# Patient Record
Sex: FEMALE | Race: WHITE | Employment: OTHER | URBAN - METROPOLITAN AREA
[De-identification: names, ages, dates, MRNs, and addresses within clinical notes are randomized per-mention and may not be internally consistent; named-entity substitution may affect disease eponyms.]

---

## 2019-11-12 ENCOUNTER — TELEPHONE (OUTPATIENT)
Dept: FAMILY MEDICINE CLINIC | Age: 84
End: 2019-11-12

## 2019-11-13 ENCOUNTER — OFFICE VISIT (OUTPATIENT)
Dept: FAMILY MEDICINE CLINIC | Age: 84
End: 2019-11-13
Payer: MEDICARE

## 2019-11-13 VITALS
RESPIRATION RATE: 18 BRPM | TEMPERATURE: 98.4 F | WEIGHT: 110 LBS | BODY MASS INDEX: 21.6 KG/M2 | HEART RATE: 88 BPM | OXYGEN SATURATION: 98 % | DIASTOLIC BLOOD PRESSURE: 98 MMHG | HEIGHT: 60 IN | SYSTOLIC BLOOD PRESSURE: 100 MMHG

## 2019-11-13 DIAGNOSIS — Z93.9 HISTORY OF CREATION OF OSTOMY (HCC): ICD-10-CM

## 2019-11-13 DIAGNOSIS — I25.2 HISTORY OF MI (MYOCARDIAL INFARCTION): ICD-10-CM

## 2019-11-13 DIAGNOSIS — I25.10 CORONARY ARTERY DISEASE DUE TO LIPID RICH PLAQUE: ICD-10-CM

## 2019-11-13 DIAGNOSIS — M80.80XA LOCALIZED OSTEOPOROSIS WITH CURRENT PATHOLOGICAL FRACTURE, INITIAL ENCOUNTER: ICD-10-CM

## 2019-11-13 DIAGNOSIS — I25.83 CORONARY ARTERY DISEASE DUE TO LIPID RICH PLAQUE: ICD-10-CM

## 2019-11-13 DIAGNOSIS — S72.009A CLOSED FRACTURE OF HIP, UNSPECIFIED LATERALITY, INITIAL ENCOUNTER (HCC): ICD-10-CM

## 2019-11-13 DIAGNOSIS — Z91.81 AT MODERATE RISK FOR FALL: ICD-10-CM

## 2019-11-13 DIAGNOSIS — Z09 HOSPITAL DISCHARGE FOLLOW-UP: ICD-10-CM

## 2019-11-13 DIAGNOSIS — I10 ESSENTIAL HYPERTENSION: ICD-10-CM

## 2019-11-13 DIAGNOSIS — K57.92 DIVERTICULITIS: Primary | ICD-10-CM

## 2019-11-13 PROCEDURE — 99204 OFFICE O/P NEW MOD 45 MIN: CPT | Performed by: INTERNAL MEDICINE

## 2019-11-13 RX ORDER — FUROSEMIDE 40 MG/1
40 TABLET ORAL
COMMUNITY
End: 2020-03-11 | Stop reason: ALTCHOICE

## 2019-11-13 RX ORDER — HYDROCODONE BITARTRATE AND ACETAMINOPHEN 5; 325 MG/1; MG/1
1 TABLET ORAL
COMMUNITY
Start: 2019-11-08 | End: 2019-11-13 | Stop reason: ALTCHOICE

## 2019-11-13 RX ORDER — NITROGLYCERIN 0.4 MG/1
0.4 TABLET SUBLINGUAL
COMMUNITY
End: 2020-09-08 | Stop reason: SDUPTHER

## 2019-11-13 RX ORDER — IBANDRONATE SODIUM 150 MG/1
150 TABLET, FILM COATED ORAL
Qty: 30 TABLET | Refills: 5 | Status: SHIPPED | OUTPATIENT
Start: 2019-11-13

## 2019-11-13 RX ORDER — BRIMONIDINE TARTRATE 2 MG/ML
1 SOLUTION/ DROPS OPHTHALMIC
COMMUNITY

## 2019-11-13 RX ORDER — LOSARTAN POTASSIUM 25 MG/1
25 TABLET ORAL
COMMUNITY
End: 2022-06-17

## 2019-11-13 RX ORDER — POTASSIUM CHLORIDE 750 MG/1
20 TABLET, EXTENDED RELEASE ORAL
COMMUNITY
End: 2021-06-08 | Stop reason: SDUPTHER

## 2019-11-13 RX ORDER — CARVEDILOL 3.12 MG/1
3.12 TABLET ORAL
COMMUNITY

## 2019-11-13 RX ORDER — IBANDRONATE SODIUM 150 MG/1
150 TABLET, FILM COATED ORAL
COMMUNITY
End: 2019-11-13 | Stop reason: SDUPTHER

## 2019-11-13 ASSESSMENT — PATIENT HEALTH QUESTIONNAIRE - PHQ9
1. LITTLE INTEREST OR PLEASURE IN DOING THINGS: 0
2. FEELING DOWN, DEPRESSED OR HOPELESS: 0
SUM OF ALL RESPONSES TO PHQ9 QUESTIONS 1 & 2: 0
SUM OF ALL RESPONSES TO PHQ QUESTIONS 1-9: 0
SUM OF ALL RESPONSES TO PHQ QUESTIONS 1-9: 0

## 2019-11-17 ASSESSMENT — ENCOUNTER SYMPTOMS
BELCHING: 0
VOMITING: 0
FLATUS: 0
CONSTIPATION: 0
SHORTNESS OF BREATH: 0
DIARRHEA: 1
BLOOD IN STOOL: 0
ANAL BLEEDING: 0
ABDOMINAL PAIN: 0
NAUSEA: 0
HEMATOCHEZIA: 0
COLOR CHANGE: 0
ABDOMINAL DISTENTION: 0

## 2019-11-20 ENCOUNTER — TELEPHONE (OUTPATIENT)
Dept: FAMILY MEDICINE CLINIC | Age: 84
End: 2019-11-20

## 2020-01-29 ENCOUNTER — OFFICE VISIT (OUTPATIENT)
Dept: FAMILY MEDICINE CLINIC | Age: 85
End: 2020-01-29
Payer: MEDICARE

## 2020-01-29 VITALS
HEIGHT: 60 IN | RESPIRATION RATE: 16 BRPM | OXYGEN SATURATION: 95 % | BODY MASS INDEX: 21.79 KG/M2 | WEIGHT: 111 LBS | DIASTOLIC BLOOD PRESSURE: 60 MMHG | SYSTOLIC BLOOD PRESSURE: 118 MMHG | TEMPERATURE: 97.7 F | HEART RATE: 88 BPM

## 2020-01-29 PROCEDURE — 99213 OFFICE O/P EST LOW 20 MIN: CPT | Performed by: INTERNAL MEDICINE

## 2020-01-29 RX ORDER — AMOXICILLIN 875 MG/1
875 TABLET, COATED ORAL 2 TIMES DAILY
Qty: 20 TABLET | Refills: 0 | Status: SHIPPED | OUTPATIENT
Start: 2020-01-29 | End: 2020-02-08

## 2020-01-29 ASSESSMENT — ENCOUNTER SYMPTOMS
STRIDOR: 0
VOMITING: 0
DIARRHEA: 0
CHOKING: 0
NAUSEA: 0
ABDOMINAL PAIN: 0
RECTAL PAIN: 0
CHEST TIGHTNESS: 0
COLOR CHANGE: 0
BACK PAIN: 0
COUGH: 0
WHEEZING: 0
SHORTNESS OF BREATH: 0
CONSTIPATION: 0

## 2020-01-29 ASSESSMENT — PATIENT HEALTH QUESTIONNAIRE - PHQ9
SUM OF ALL RESPONSES TO PHQ9 QUESTIONS 1 & 2: 0
2. FEELING DOWN, DEPRESSED OR HOPELESS: 0
SUM OF ALL RESPONSES TO PHQ QUESTIONS 1-9: 0
SUM OF ALL RESPONSES TO PHQ QUESTIONS 1-9: 0
1. LITTLE INTEREST OR PLEASURE IN DOING THINGS: 0

## 2020-01-29 NOTE — PROGRESS NOTES
[facility]  [address]  [place] [address] [address]  Dept: [phone]  Dept Fax: [phone]    Randolph Banegas a 82 y.o. female who presents today for her medical conditions/complaints as notedbelow. James Baca is c/o of   Chief Complaint   [facility]   [facility]     [facility] is here for a cat bite that happened 1 week ago        HPI:     Here due to cat bite about 6 days ago   Wound is small but they just noticed swelling around the ankle and does have some redness   No fever or chills  No pus from wound   They have , patient has been rinsing is with saline and doing triple abx       Also has a spot on left temple she wants looked at not quite like a mole     Animal Bite    The incident occurred more than 2 days ago. The incident occurred at home. Pertinent negatives include no chest pain, no fussiness, no numbness, no visual disturbance, no abdominal pain, no nausea, no vomiting, no bladder incontinence, no headaches, no neck pain, no focal weakness, no decreased responsiveness and no cough. Her tetanus status is out of date. No results found for: LABA1C      ( goal A1C is < 7)   No results found for: LABMICR  No results found for: LDLCHOLESTEROL, LDLCALC    (goal LDL is <100)   No results found for: AST, ALT, BUN  BP Readings from Last 3 Encounters:   01/29/20 118/60   11/13/19 (!) 100/98          (goal 120/80)    Past Medical History:   Diagnosis Date    Bowel obstruction (HCC)     [facility] [address]     Diverticulitis     DVT (deep venous thrombosis) ([facility] [address]     Heart attack (HCC)     MRSA (methicillin resistant staph aureus) culture positive       Past Surgical History:   Procedure Laterality Date    TOTAL HIP ARTHROPLASTY         No family history on file. Social History     Tobacco Use    Smoking status: Never Smoker    Smokeless tobacco: Never Used   Substance Use Topics    Alcohol use:  Yes     Alcohol/week: 3.0 Negative for arthralgias, back pain, gait problem, joint swelling, myalgias, neck pain and neck stiffness. Skin: Positive for rash and wound. Negative for color change and pallor. Neurological: Negative for focal weakness, numbness and headaches. Hematological: Negative for adenopathy. Does not bruise/bleed easily. Psychiatric/Behavioral: Negative for sleep disturbance. Objective:      Physical Exam  Vitals signs and nursing note reviewed. Constitutional:       Appearance: She is well-developed. Cardiovascular:      Rate and Rhythm: Normal rate and regular rhythm. No extrasystoles are present. Pulses: Normal pulses. Heart sounds: Normal heart sounds, S1 normal and S2 normal. Heart sounds not distant. No murmur. Comments: Trace edema to right ankle   Non pitting   Mild erythema around wound/ankle   Neg homans   Pulmonary:      Effort: Pulmonary effort is normal. No respiratory distress. Breath sounds: Normal breath sounds. No wheezing or rales. Abdominal:      Palpations: There is no hepatomegaly or splenomegaly. Tenderness: There is no abdominal tenderness. Skin:     General: Skin is warm and dry. Capillary Refill: Capillary refill takes less than 2 seconds. Findings: Lesion, rash and wound present. Neurological:      Mental Status: She is alert and oriented to person, place, and time. Cranial Nerves: No cranial nerve deficit. /60 (Site: Right Upper Arm, Position: Sitting, Cuff Size: Medium Adult)   Pulse 88   Temp 97.7 °F (36.5 °C) (Tympanic)   Resp 16   Ht 5' (1.524 m)   Wt 111 lb (50.3 kg)   SpO2 95%   BMI 21.68 kg/m²     Assessment:       Diagnosis Orders   1. Cellulitis of left lower extremity     2. Cat bite, initial encounter     3. Skin lesion  Antonella Mac MD, Dermatology, [facility]:       No follow-ups on file.     Orders Placed This Encounter   Procedures   Antonella Mac MD, Dermatology, [place]     Referral Priority:   Routine     Referral Type:   Eval and Treat     Referral Reason:   Specialty Services Required     Referred to Provider:   Shweta Whitfield MD     Requested Specialty:   Dermatology     Number of Visits Requested:   1     Orders Placed This Encounter   Medications    amoxicillin (AMOXIL) 875 MG tablet     Sig: Take 1 tablet by mouth 2 times daily for 10 days     Dispense:  20 tablet     Refill:  0    amox fo one week   Warm compresses  Can continue management to wounds     Follow up with specialists   Place referral to dermatology , suspicious for squamous cell CA, pt does  Not have hx. Patientgiven educational materials - see patient instructions. Discussed use, benefit,and side effects of prescribed medications. All patient questions answered. Ptvoiced understanding. Reviewed health maintenance. Instructed to continue currentmedications, diet and exercise. Patient agreed with treatment plan. Follow up asdirected.      Electronically signed by Pranav Moreno DO on 1/29/2020 at 11:03 AM

## 2020-01-29 NOTE — PROGRESS NOTES
Visit Information    Have you changed or started any medications since your last visit including any over-the-counter medicines, vitamins, or herbal medicines? no   Are you having any side effects from any of your medications? -  no  Have you stopped taking any of your medications? Is so, why? -  no    Have you seen any other physician or provider since your last visit? No  Have you had any other diagnostic tests since your last visit? No  Have you been seen in the emergency room and/or had an admission to a hospital since we last saw you? No  Have you had your routine dental cleaning in the past 6 months? no    Have you activated your [employer] account? If not, what are your barriers?  No: Code       Patient Care Team:  Bonita Blanc DO as PCP - General (Family Medicine)  Bonita Blanc DO as PCP - [facility] Provider    Medical History Review  Past Medical, Family, and Social History reviewed and does contribute to the patient presenting condition    Health Maintenance   Topic Date Due    Potassium monitoring  1937    Creatinine monitoring  1937    Shingles Vaccine (1 of 2) 1987    Pneumococcal 65+ years Vaccine (1 of 1 - PPSV23) 2002    Annual Wellness Visit (AWV)  2019    DEXA (modify frequency per FRAX score)  2020 (Originally 2002)    DTaP/Tdap/Td vaccine (1 - Tdap) 2020 (Originally 1948)    Flu vaccine (1) 2020 (Originally 2019)

## 2020-02-25 ENCOUNTER — TELEPHONE (OUTPATIENT)
Dept: FAMILY MEDICINE CLINIC | Age: 85
End: 2020-02-25

## 2020-02-25 RX ORDER — POTASSIUM CHLORIDE 1.5 G/1.77G
20 POWDER, FOR SOLUTION ORAL DAILY
Qty: 30 EACH | Refills: 5 | Status: SHIPPED | OUTPATIENT
Start: 2020-02-25 | End: 2021-02-11 | Stop reason: SDUPTHER

## 2020-02-25 NOTE — TELEPHONE ENCOUNTER
Patient called requesting you to send in a script for potassium 20 mg either in a liquid or powder? Please advise.  Thank you

## 2020-03-11 ENCOUNTER — HOSPITAL ENCOUNTER (OUTPATIENT)
Age: 85
Setting detail: SPECIMEN
Discharge: HOME | End: 2020-03-11
Payer: MEDICARE

## 2020-03-11 ENCOUNTER — OFFICE VISIT (OUTPATIENT)
Dept: FAMILY MEDICINE CLINIC | Age: 85
End: 2020-03-11
Payer: MEDICARE

## 2020-03-11 VITALS
DIASTOLIC BLOOD PRESSURE: 62 MMHG | HEIGHT: 60 IN | SYSTOLIC BLOOD PRESSURE: 100 MMHG | WEIGHT: 108 LBS | BODY MASS INDEX: 21.2 KG/M2 | RESPIRATION RATE: 16 BRPM | TEMPERATURE: 97.2 F

## 2020-03-11 LAB
ABSOLUTE EOS #: 0.16 K/UL (ref 0–0.44)
ABSOLUTE IMMATURE GRANULOCYTE: 0.05 K/UL (ref 0–0.3)
ABSOLUTE LYMPH #: 3.15 K/UL (ref 1.1–3.7)
ABSOLUTE MONO #: 0.61 K/UL (ref 0.1–1.2)
ALBUMIN SERPL-MCNC: 3.3 G/DL (ref 3.5–5.2)
ALBUMIN/GLOBULIN RATIO: 0.8 (ref 1–2.5)
ALP BLD-CCNC: 83 U/L (ref 35–104)
ALT SERPL-CCNC: 11 U/L (ref 5–33)
ANION GAP SERPL CALCULATED.3IONS-SCNC: 13 MMOL/L (ref 9–17)
AST SERPL-CCNC: 18 U/L
BASOPHILS # BLD: 1 % (ref 0–2)
BASOPHILS ABSOLUTE: 0.12 K/UL (ref 0–0.2)
BILIRUB SERPL-MCNC: 0.18 MG/DL (ref 0.3–1.2)
BUN BLDV-MCNC: 24 MG/DL (ref 8–23)
BUN/CREAT BLD: ABNORMAL (ref 9–20)
CALCIUM SERPL-MCNC: 9.4 MG/DL (ref 8.6–10.4)
CHLORIDE BLD-SCNC: 104 MMOL/L (ref 98–107)
CO2: 14 MMOL/L (ref 20–31)
CREAT SERPL-MCNC: 0.89 MG/DL (ref 0.5–0.9)
DIFFERENTIAL TYPE: ABNORMAL
EOSINOPHILS RELATIVE PERCENT: 2 % (ref 1–4)
FOLATE: >20 NG/ML
GFR AFRICAN AMERICAN: >60 ML/MIN
GFR NON-AFRICAN AMERICAN: >60 ML/MIN
GFR SERPL CREATININE-BSD FRML MDRD: ABNORMAL ML/MIN/{1.73_M2}
GFR SERPL CREATININE-BSD FRML MDRD: ABNORMAL ML/MIN/{1.73_M2}
GLUCOSE BLD-MCNC: 111 MG/DL (ref 70–99)
HCT VFR BLD CALC: 37.1 % (ref 36.3–47.1)
HEMOGLOBIN: 11.4 G/DL (ref 11.9–15.1)
IMMATURE GRANULOCYTES: 1 %
LYMPHOCYTES # BLD: 35 % (ref 24–43)
MAGNESIUM: 1.8 MG/DL (ref 1.6–2.6)
MCH RBC QN AUTO: 29.2 PG (ref 25.2–33.5)
MCHC RBC AUTO-ENTMCNC: 30.7 G/DL (ref 28.4–34.8)
MCV RBC AUTO: 95.1 FL (ref 82.6–102.9)
MONOCYTES # BLD: 7 % (ref 3–12)
NRBC AUTOMATED: 0 PER 100 WBC
PDW BLD-RTO: 13.2 % (ref 11.8–14.4)
PLATELET # BLD: 845 K/UL (ref 138–453)
PLATELET ESTIMATE: ABNORMAL
PMV BLD AUTO: 9.6 FL (ref 8.1–13.5)
POTASSIUM SERPL-SCNC: 5 MMOL/L (ref 3.7–5.3)
RBC # BLD: 3.9 M/UL (ref 3.95–5.11)
RBC # BLD: ABNORMAL 10*6/UL
SEG NEUTROPHILS: 54 % (ref 36–65)
SEGMENTED NEUTROPHILS ABSOLUTE COUNT: 5.02 K/UL (ref 1.5–8.1)
SODIUM BLD-SCNC: 131 MMOL/L (ref 135–144)
TOTAL PROTEIN: 7.6 G/DL (ref 6.4–8.3)
TSH SERPL DL<=0.05 MIU/L-ACNC: 3.04 MIU/L (ref 0.3–5)
VITAMIN B-12: 857 PG/ML (ref 232–1245)
VITAMIN D 25-HYDROXY: 31.1 NG/ML (ref 30–100)
WBC # BLD: 9.1 K/UL (ref 3.5–11.3)
WBC # BLD: ABNORMAL 10*3/UL

## 2020-03-11 PROCEDURE — 99214 OFFICE O/P EST MOD 30 MIN: CPT | Performed by: INTERNAL MEDICINE

## 2020-03-12 LAB
THYROGLOBULIN AB: <20 IU/ML (ref 0–40)
THYROID PEROXIDASE (TPO) AB: 37.2 IU/ML (ref 0–35)

## 2020-03-13 ENCOUNTER — TELEPHONE (OUTPATIENT)
Dept: FAMILY MEDICINE CLINIC | Age: 85
End: 2020-03-13

## 2020-03-17 ENCOUNTER — HOSPITAL ENCOUNTER (OUTPATIENT)
Age: 85
Setting detail: SPECIMEN
Discharge: HOME | End: 2020-03-17
Payer: MEDICARE

## 2020-03-17 ASSESSMENT — ENCOUNTER SYMPTOMS
RECTAL PAIN: 0
VOMITING: 0
RHINORRHEA: 0
CONSTIPATION: 0
VOICE CHANGE: 0
BACK PAIN: 0
CHANGE IN BOWEL HABIT: 0
SORE THROAT: 0
ANAL BLEEDING: 0
BLOOD IN STOOL: 0
COUGH: 0
FLATUS: 0
NAUSEA: 1
SHORTNESS OF BREATH: 0
CHEST TIGHTNESS: 0
BELCHING: 0
ABDOMINAL PAIN: 1
SINUS PAIN: 0
DIARRHEA: 0
WHEEZING: 0
ABDOMINAL DISTENTION: 0
SINUS PRESSURE: 0
VISUAL CHANGE: 0
CHOKING: 0
SWOLLEN GLANDS: 0
STRIDOR: 0
TROUBLE SWALLOWING: 0

## 2020-03-17 NOTE — PROGRESS NOTES
[facility]  [address] [facility] [address] [address]  Dept: [phone]  Dept Fax: [phone]    Lo Riley a 82 y.o. female who presents today for her medical conditions/complaints as notedbelow. Beryle Moos is c/o of   Chief Complaint   Patient presents with    Blood Work     pt is here for order for blood work     Fatigue         HPI:     Patient here for follow up   Here with daughter is requesting blood work   This is actually a hospital follow up x 2  Was initially in hospital for colostomy reversal , they could not reverse the entire thing so she has illestomy now   They may attempt to reverse in 3 months   On initial discharge /when she went home they had issues with the bag/ and a lot of leakage , drainage , abnormal drainage   Then patient developed severe abdominal pain   Worsen than her initial pain ie the sbo that created the stoma/colostomy   For this they went back to [facility] ED on 2/29 /2019   They told her it was related to the surgical site wall pain/inflammation , originally were not going to keep her though she was in 10/10 pain   Ended up keeping her over night   Ct scan showed inflammation/post surgical changes and small ileus   She could barely move and was weak   Once they could get her up out of bed and move with walker they sent her home     Pts daughter states she has been very weak and very tired ever since   Up until the last day and a half she has done quite a bit better  They did follow up with surgeon dr. Anais Vidal yesterday for post op   No fever or chills   Pt is eating overall okay , very small meals   Pain has subsided greatly   Walking with walker , gait is slow but not unsteady   Not dizzy   No cp or sob , no cough   No HAs.    Did have slightly abnormal labs and anemic after surgery   bp is low but has been   Not taking the lasix any longer   Still takes one coreg at night , cardio was okay with both of the above         Fatigue   This is a recurrent problem. The current episode started 1 to 4 weeks ago. Associated symptoms include abdominal pain, arthralgias, fatigue, myalgias, nausea and weakness. Pertinent negatives include no anorexia, change in bowel habit, chest pain, chills, congestion, coughing, diaphoresis, fever, headaches, joint swelling, neck pain, numbness, rash, sore throat, swollen glands, urinary symptoms, vertigo, visual change or vomiting. The symptoms are aggravated by exertion, bending, stress, twisting, walking and standing. She has tried rest, walking, sleep and position changes for the symptoms. The treatment provided mild relief. Abdominal Pain   This is a recurrent problem. The current episode started 1 to 4 weeks ago. The onset quality is sudden. The problem occurs 2 to 4 times per day. The problem has been rapidly improving. The pain is located in the generalized abdominal region. The pain is at a severity of 2/10. The pain is mild. The quality of the pain is aching. The abdominal pain does not radiate. Associated symptoms include arthralgias, myalgias and nausea. Pertinent negatives include no anorexia, belching, constipation, diarrhea, dysuria, fever, flatus, frequency, headaches, hematuria or vomiting. The pain is aggravated by movement and palpation. Her past medical history is significant for abdominal surgery and irritable bowel syndrome. There is no history of gallstones, GERD, pancreatitis, PUD or ulcerative colitis.        No results found for: LABA1C      ( goal A1C is < 7)   No results found for: LABMICR  No results found for: LDLCHOLESTEROL, LDLCALC    (goal LDL is <100)   AST (U/L)   Date Value   03/11/2020 18     ALT (U/L)   Date Value   03/11/2020 11     BUN (mg/dL)   Date Value   03/11/2020 24 (H)     BP Readings from Last 3 Encounters:   03/11/20 100/62   01/29/20 118/60   11/13/19 (!) 100/98          (goal 120/80)    Past Medical History:   Diagnosis Date    [facility]el obstruction ([facility] [address]     Broken hip ([facility] [address]     Diverticulitis     DVT (deep venous thrombosis) ([facility] [address]     Heart attack (HCC)     MRSA (methicillin resistant staph aureus) culture positive       Past Surgical History:   Procedure Laterality Date    TOTAL HIP ARTHROPLASTY         No family history on file. Social History     Tobacco Use    Smoking status: Never Smoker    Smokeless tobacco: Never Used   Substance Use Topics    Alcohol use: Yes     Alcohol/week: 3.0 standard drinks     Types: 3 Glasses of wine per week      Current Outpatient Medications   Medication Sig Dispense Refill    potassium chloride (KLOR-CON) 20 MEQ packet Take 20 mEq by mouth daily 30 each 5    aspirin 81 MG tablet Take 81 mg by mouth      brimonidine (ALPHAGAN) 0.2 % ophthalmic solution Apply 1 drop to eye      carvedilol (COREG) 3.125 MG tablet Take 3.125 mg by mouth      losartan (COZAAR) 25 MG tablet Take 25 mg by mouth      nitroGLYCERIN (NITROSTAT) 0.4 MG SL tablet Place 0.4 mg under the tongue      potassium chloride (KLOR-CON M) 10 MEQ extended release tablet Take 20 mEq by mouth      ibandronate (BONIVA) 150 MG tablet Take 1 tablet by mouth every 30 days 30 tablet 5     No current facility-administered medications for this visit.       No Known Allergies       Health Maintenance   Topic Date Due    Shingles Vaccine (1 of 2) 12/01/1987    Pneumococcal 65+ years Vaccine (1 of 1 - PPSV23) 12/01/2002    DEXA (modify frequency per FRAX score)  11/13/2020 (Originally 12/1/2002)    DTaP/Tdap/Td vaccine (1 - Tdap) 11/13/2020 (Originally 12[birth year]1/1956)    Flu vaccine (1) 11/13/2020 (Originally 9/1/2019)    Annual Wellness Visit (AWV)  03/11/2021 (Originally 11/13/2019)    Potassium monitoring  03/11/2021    Creatinine monitoring  03/11/2021    Hepatitis A vaccine  Aged Out    Hepatitis B vaccine  Aged Out    Hib vaccine  Aged Out    Meningococcal (ACWY) vaccine  Aged Out       Subjective:     Review of Systems Constitutional: Positive for activity change and fatigue. Negative for appetite change, chills, diaphoresis, fever and unexpected weight change. HENT: Negative for congestion, rhinorrhea, sinus pressure, sinus pain, sneezing, sore throat, tinnitus, trouble swallowing and voice change. Eyes: Negative for visual disturbance. Respiratory: Negative for cough, choking, chest tightness, shortness of breath, wheezing and stridor. Cardiovascular: Negative for chest pain, palpitations and leg swelling. Gastrointestinal: Positive for abdominal pain and nausea. Negative for abdominal distention, anal bleeding, anorexia, blood in stool, change in bowel habit, constipation, diarrhea, flatus, rectal pain and vomiting. Endocrine: Negative for polydipsia and polyphagia. Genitourinary: Negative for difficulty urinating, dyspareunia, dysuria, flank pain, frequency, hematuria, menstrual problem, pelvic pain and urgency. Musculoskeletal: Positive for arthralgias and myalgias. Negative for back pain, gait problem, joint swelling and neck pain. Skin: Negative for rash. Allergic/Immunologic: Positive for immunocompromised state. Neurological: Positive for weakness. Negative for dizziness, vertigo, tremors, seizures, syncope, facial asymmetry, speech difficulty, light-headedness, numbness and headaches. Hematological: Negative for adenopathy. Bruises/bleeds easily. Psychiatric/Behavioral: Negative for decreased concentration and sleep disturbance. The patient is not nervous/anxious. Objective:      Physical Exam  Constitutional:       General: She is not in acute distress. Appearance: She is well-developed. She is ill-appearing. She is not toxic-appearing or diaphoretic. Comments:  Thin , chronically ill , NAD, not uncomfortable    HENT:      Right Ear: External ear normal.      Left Ear: External ear normal.      Nose: Nose normal.   Eyes:      Pupils: Pupils are equal, round, and reactive to

## 2020-03-19 LAB — V617F MUTATION, QNT: 0 %

## 2020-09-08 RX ORDER — NITROGLYCERIN 0.4 MG/1
0.4 TABLET SUBLINGUAL EVERY 5 MIN PRN
Qty: 25 TABLET | Refills: 5 | Status: SHIPPED | OUTPATIENT
Start: 2020-09-08 | End: 2022-03-15 | Stop reason: SDUPTHER

## 2020-09-14 RX ORDER — FUROSEMIDE 40 MG/1
40 TABLET ORAL 2 TIMES DAILY
Qty: 60 TABLET | Refills: 5 | Status: SHIPPED | OUTPATIENT
Start: 2020-09-14 | End: 2021-10-14 | Stop reason: SDUPTHER

## 2020-09-15 ENCOUNTER — TELEPHONE (OUTPATIENT)
Dept: FAMILY MEDICINE CLINIC | Age: 85
End: 2020-09-15

## 2020-09-15 RX ORDER — LATANOPROST 50 UG/ML
1 SOLUTION/ DROPS OPHTHALMIC DAILY
Qty: 1 BOTTLE | Refills: 3 | Status: SHIPPED | OUTPATIENT
Start: 2020-09-15

## 2021-02-11 RX ORDER — POTASSIUM CHLORIDE 1.5 G/1.77G
20 POWDER, FOR SOLUTION ORAL DAILY
Qty: 30 EACH | Refills: 5 | Status: SHIPPED | OUTPATIENT
Start: 2021-02-11 | End: 2021-06-08

## 2021-03-17 ENCOUNTER — OFFICE VISIT (OUTPATIENT)
Dept: FAMILY MEDICINE CLINIC | Age: 86
End: 2021-03-17
Payer: MEDICARE

## 2021-03-17 VITALS
TEMPERATURE: 97.2 F | BODY MASS INDEX: 23.36 KG/M2 | DIASTOLIC BLOOD PRESSURE: 65 MMHG | SYSTOLIC BLOOD PRESSURE: 117 MMHG | HEART RATE: 70 BPM | HEIGHT: 60 IN | WEIGHT: 119 LBS | OXYGEN SATURATION: 96 %

## 2021-03-17 DIAGNOSIS — H61.23 BILATERAL HEARING LOSS DUE TO CERUMEN IMPACTION: ICD-10-CM

## 2021-03-17 DIAGNOSIS — Z00.00 MEDICARE ANNUAL WELLNESS VISIT, SUBSEQUENT: Primary | ICD-10-CM

## 2021-03-17 DIAGNOSIS — Z00.00 ROUTINE GENERAL MEDICAL EXAMINATION AT A HEALTH CARE FACILITY: ICD-10-CM

## 2021-03-17 PROCEDURE — G0439 PPPS, SUBSEQ VISIT: HCPCS | Performed by: NURSE PRACTITIONER

## 2021-03-17 PROCEDURE — 69209 REMOVE IMPACTED EAR WAX UNI: CPT | Performed by: NURSE PRACTITIONER

## 2021-03-17 ASSESSMENT — LIFESTYLE VARIABLES
HOW OFTEN DURING THE LAST YEAR HAVE YOU FOUND THAT YOU WERE NOT ABLE TO STOP DRINKING ONCE YOU HAD STARTED: 0
AUDIT TOTAL SCORE: 1
HOW OFTEN DURING THE LAST YEAR HAVE YOU FAILED TO DO WHAT WAS NORMALLY EXPECTED FROM YOU BECAUSE OF DRINKING: 0
AUDIT-C TOTAL SCORE: 1
HAS A RELATIVE, FRIEND, DOCTOR, OR ANOTHER HEALTH PROFESSIONAL EXPRESSED CONCERN ABOUT YOUR DRINKING OR SUGGESTED YOU CUT DOWN: 0
HOW OFTEN DURING THE LAST YEAR HAVE YOU HAD A FEELING OF GUILT OR REMORSE AFTER DRINKING: 0
HOW OFTEN DURING THE LAST YEAR HAVE YOU BEEN UNABLE TO REMEMBER WHAT HAPPENED THE NIGHT BEFORE BECAUSE YOU HAD BEEN DRINKING: 0
HOW OFTEN DURING THE LAST YEAR HAVE YOU NEEDED AN ALCOHOLIC DRINK FIRST THING IN THE MORNING TO GET YOURSELF GOING AFTER A NIGHT OF HEAVY DRINKING: 0

## 2021-03-17 ASSESSMENT — PATIENT HEALTH QUESTIONNAIRE - PHQ9
2. FEELING DOWN, DEPRESSED OR HOPELESS: 1
1. LITTLE INTEREST OR PLEASURE IN DOING THINGS: 0
SUM OF ALL RESPONSES TO PHQ QUESTIONS 1-9: 1

## 2021-03-17 NOTE — PATIENT INSTRUCTIONS
Personalized Preventive Plan for Armando Holden - 3/17/2021  Medicare offers a range of preventive health benefits. Some of the tests and screenings are paid in full while other may be subject to a deductible, co-insurance, and/or copay. Some of these benefits include a comprehensive review of your medical history including lifestyle, illnesses that may run in your family, and various assessments and screenings as appropriate. After reviewing your medical record and screening and assessments performed today your provider may have ordered immunizations, labs, imaging, and/or referrals for you. A list of these orders (if applicable) as well as your Preventive Care list are included within your After Visit Summary for your review. Other Preventive Recommendations:    · A preventive eye exam performed by an eye specialist is recommended every 1-2 years to screen for glaucoma; cataracts, macular degeneration, and other eye disorders. · A preventive dental visit is recommended every 6 months. · Try to get at least 150 minutes of exercise per week or 10,000 steps per day on a pedometer . · Order or download the FREE \"Exercise & Physical Activity: Your Everyday Guide\" from [employer]. Call 8-288.624.5409 or search [employer] online. · You need 6514-8581 mg of calcium and 7576-7803 IU of vitamin D per day. It is possible to meet your calcium requirement with diet alone, but a vitamin D supplement is usually necessary to meet this goal.  · When exposed to the sun, use a sunscreen that protects against both UVA and UVB radiation with an SPF of 30 or greater. Reapply every 2 to 3 hours or after sweating, drying off with a towel, or swimming. · Always wear a seat belt when traveling in a car. Always wear a helmet when riding a bicycle or motorcycle.

## 2021-03-17 NOTE — PROGRESS NOTES
resistant staph aureus) culture positive        Past Surgical History:   Procedure Laterality Date    TOTAL HIP ARTHROPLASTY         History reviewed. No pertinent family history. CareTeam (Including outside providers/suppliers regularly involved in providing care):   Patient Care Team:  Celine Modi DO as PCP - General (Family Medicine)  Celine Modi DO as PCP - [facility] Provider    Wt Readings from Last 3 Encounters:   03/17/21 119 lb (54 kg)   03/11/20 108 lb (49 kg)   01/29/20 111 lb (50.3 kg)     Vitals:    03/17/21 1545   BP: 117/65   Site: Left Upper Arm   Position: Sitting   Cuff Size: Medium Adult   Pulse: 70   Temp: 97.2 °F (36.2 °C)   TempSrc: Tympanic   SpO2: 96%   Weight: 119 lb (54 kg)   Height: 5' (1.524 m)     Body mass index is 23.24 kg/m². Based upon direct observation of the patient, evaluation of cognition reveals recent and remote memory intact. ENT: tympanic membrane, external ear and ear canal normal bilaterally, oropharynx clear and moist with normal mucous membranes and bilateral ear canal abnormal- cerumen impaction, ears irrigated with warm water and hydrogen peroxide Post irrigation, TM intact without redness or perforation. Pt tolerated well. Patient's complete Health Risk Assessment and screening values have been reviewed and are found in Flowsheets. The following problems were reviewed today and where indicated follow up appointments were made and/or referrals ordered. Positive Risk Factor Screenings with Interventions:            General Health and ACP:  General  In general, how would you say your health is?: Good  In the past 7 days, have you experienced any of the following?  New or Increased Pain, New or Increased Fatigue, Loneliness, Social Isolation, Stress or Anger?: None of These  Do you get the social and emotional support that you need?: Yes  Do you have a Living Will?: Yes  Advance Directives     Power of  Living Will ACP-Advance Directive ACP-Power of     Not on File Not on File Not on File Not on File      General Health Risk Interventions:  · No Living Will: ACP documents already completed- patient asked to provide copy to the office    Health Habits/Nutrition:  Health Habits/Nutrition  Do you exercise for at least 20 minutes 2-3 times per week?: Yes  Have you lost any weight without trying in the past 3 months?: No  Do you eat only one meal per day?: No  Have you seen the dentist within the past year?: (!) No  Body mass index: 23.24  Health Habits/Nutrition Interventions:  · Dental exam overdue:  patient declines dental evaluation    Hearing/Vision:  No exam data present  Hearing/Vision  Do you or your family notice any trouble with your hearing that hasn't been managed with hearing aids?: (!) Yes(hearing aids)  Do you have difficulty driving, watching TV, or doing any of your daily activities because of your eyesight?: (!) Yes(reads alot)  Have you had an eye exam within the past year?: Yes  Hearing/Vision Interventions:  · Hearing concerns:  patient declines any further evaluation/treatment for hearing issues, ears flushed today  · Vision concerns:  patient encouraged to make appointment with his/her eye specialist    Safety:  Safety  Do you have working smoke detectors?: Yes  Have all throw rugs been removed or fastened?: Yes  Do you have non-slip mats or surfaces in all bathtubs/showers?: Yes  Do all of your stairways have a railing or banister?: (!) No(no stairs)  Are your doorways, halls and stairs free of clutter?: Yes  Do you always fasten your seatbelt when you are in a car?: Yes  Safety Interventions:  · Home safety tips provided    ADL:  ADLs  In the past 7 days, did you need help from others to perform any of the following everyday activities?  Eating, dressing, grooming, bathing, toileting, or walking/balance?: (!) Walking/Balance(uses walker)  In the past 7 days, did you need help from others to take care of any of the following? Laundry, housekeeping, banking/finances, shopping, telephone use, food preparation, transportation, or taking medications?: None  ADL Interventions:  · Patient declines any further evaluation/treatment for this issue  Family very supportive and helpful    Personalized Preventive Plan   Current Health Maintenance Status  Immunization History   Administered Date(s) Administered    Tdap (Boostrix, Adacel) 10/31/2016        Health Maintenance   Topic Date Due    COVID-19 Vaccine (1) Never done    Shingles Vaccine (1 of 2) Never done    DEXA (modify frequency per FRAX score)  Never done    Pneumococcal 65+ years Vaccine (1 of 1 - PPSV23) Never done   [employer] Visit (AWV)  Never done    Potassium monitoring  03/11/2021    Creatinine monitoring  03/11/2021    Flu vaccine (1) 03/17/2022 (Originally 9/1/2020)    DTaP/Tdap/Td vaccine (2 - Td) 10/31/2026    Hepatitis A vaccine  Aged Out    Hepatitis B vaccine  Aged Out    Hib vaccine  Aged Out    Meningococcal (ACWY) vaccine  Aged Out     Recommendations for DCITS Due: see orders and patient instructions/AVS.  . Recommended screening schedule for the next 5-10 years is provided to the patient in written form: see Patient Instructions/AVS.    Aundrea Carias was seen today for otalgia and medicare awv. Diagnoses and all orders for this visit:    Medicare annual wellness visit, subsequent    Routine general medical examination at a health care facility    Bilateral hearing loss due to cerumen impaction           otc debrox for cerumen for future prevention  Doing well overall, still grieving loss of her   Stay as active as able  Vitals stable today  Will request prev.  Vaccine info from [facility] pharmacy

## 2021-03-17 NOTE — PROGRESS NOTES
Visit Information    Have you changed or started any medications since your last visit including any over-the-counter medicines, vitamins, or herbal medicines? no   Have you stopped taking any of your medications? Is so, why? -  no  Are you having any side effects from any of your medications? - no    Have you seen any other physician or provider since your last visit?  no   Have you had any other diagnostic tests since your last visit?  no   Have you been seen in the emergency room and/or had an admission in a hospital since we last saw you?  no   Have you had your routine dental cleaning in the past 6 months?  no     Do you have an active MyChart account? If no, what is the barrier?   No: na    Patient Care Team:  Ruby Akhtar DO as PCP - General (Family Medicine)  Ruby Akhtar DO as PCP - [facility] Provider    Medical History Review  Past Medical, Family, and Social History reviewed and  contribute to the patient presenting condition    Health Maintenance   Topic Date Due    COVID-19 Vaccine (1) Never done    DTaP/Tdap/Td vaccine (1 - Tdap) Never done    Shingles Vaccine (1 of 2) Never done    DEXA (modify frequency per FRAX score)  Never done    Pneumococcal 65+ years Vaccine (1 of 1 - PPSV23) Never done   [employer] Visit (AWV)  Never done    Flu vaccine (1) Never done    Potassium monitoring  03/11/2021    Creatinine monitoring  03/11/2021    Hepatitis A vaccine  Aged Out    Hepatitis B vaccine  Aged Out    Hib vaccine  Aged Out    Meningococcal (ACWY) vaccine  Aged Out

## 2021-03-22 ENCOUNTER — TELEPHONE (OUTPATIENT)
Dept: FAMILY MEDICINE CLINIC | Age: 86
End: 2021-03-22

## 2021-06-08 ENCOUNTER — TELEPHONE (OUTPATIENT)
Dept: FAMILY MEDICINE CLINIC | Age: 86
End: 2021-06-08

## 2021-06-08 DIAGNOSIS — Z76.0 MEDICATION REFILL: Primary | ICD-10-CM

## 2021-06-08 RX ORDER — POTASSIUM CHLORIDE 750 MG/1
20 TABLET, EXTENDED RELEASE ORAL DAILY
Qty: 60 TABLET | Refills: 0 | Status: SHIPPED | OUTPATIENT
Start: 2021-06-08 | End: 2021-07-06

## 2021-06-10 ENCOUNTER — TELEPHONE (OUTPATIENT)
Dept: FAMILY MEDICINE CLINIC | Age: 86
End: 2021-06-10

## 2021-06-10 NOTE — TELEPHONE ENCOUNTER
Patient states it is for having 2 previously broken femurs and she requires a walker. Patient states that once its complete, that she'd like for us to give it to your assistant that is training with you as she is the patients granddaughter.

## 2021-06-10 NOTE — TELEPHONE ENCOUNTER
Yes that is fine, find out for what problem and place on my desk asap as I will not be back until the 21st

## 2021-07-04 DIAGNOSIS — Z76.0 MEDICATION REFILL: ICD-10-CM

## 2021-07-06 RX ORDER — POTASSIUM CHLORIDE 750 MG/1
TABLET, EXTENDED RELEASE ORAL
Qty: 60 TABLET | Refills: 0 | Status: SHIPPED | OUTPATIENT
Start: 2021-07-06 | End: 2021-07-16

## 2021-09-30 ENCOUNTER — TELEPHONE (OUTPATIENT)
Dept: FAMILY MEDICINE CLINIC | Age: 86
End: 2021-09-30

## 2021-09-30 DIAGNOSIS — E78.2 MIXED HYPERLIPIDEMIA: Primary | ICD-10-CM

## 2021-09-30 RX ORDER — ATORVASTATIN CALCIUM 80 MG/1
80 TABLET, FILM COATED ORAL DAILY
Qty: 30 TABLET | Refills: 5 | Status: SHIPPED | OUTPATIENT
Start: 2021-09-30 | End: 2021-09-30 | Stop reason: ALTCHOICE

## 2021-09-30 RX ORDER — ATORVASTATIN CALCIUM 80 MG/1
80 TABLET, FILM COATED ORAL DAILY
Qty: 90 TABLET | Refills: 1 | Status: SHIPPED | OUTPATIENT
Start: 2021-09-30 | End: 2022-10-23

## 2021-09-30 NOTE — TELEPHONE ENCOUNTER
Does pt want labs? It has been > 1 year\ I also do not see this on her med list? Is this new or from another provider?

## 2021-09-30 NOTE — TELEPHONE ENCOUNTER
Patient stated that the atorvastatin was being issued by her dr blankenship in [place] and also by Dr. Brett Andrade and that she's been on this medication for years. Patient also states that she would like the lab orders placed. Please advise.

## 2021-10-14 RX ORDER — FUROSEMIDE 40 MG/1
40 TABLET ORAL 2 TIMES DAILY
Qty: 60 TABLET | Refills: 5 | Status: SHIPPED | OUTPATIENT
Start: 2021-10-14 | End: 2022-10-19

## 2021-10-20 LAB
ALBUMIN SERPL-MCNC: 4.1 G/DL
ALP BLD-CCNC: 89 U/L
ALT SERPL-CCNC: 17 U/L
ANION GAP SERPL CALCULATED.3IONS-SCNC: 11 MMOL/L
AST SERPL-CCNC: 21 U/L
BASOPHILS ABSOLUTE: 0.1 /ΜL
BASOPHILS RELATIVE PERCENT: 1.4 %
BILIRUB SERPL-MCNC: 0.7 MG/DL (ref 0.1–1.4)
BUN BLDV-MCNC: 29 MG/DL
CALCIUM SERPL-MCNC: 9.4 MG/DL
CHLORIDE BLD-SCNC: 99 MMOL/L
CHOLESTEROL, TOTAL: 186 MG/DL
CHOLESTEROL/HDL RATIO: 2.4
CO2: 26 MMOL/L
CREAT SERPL-MCNC: 1 MG/DL
EOSINOPHILS ABSOLUTE: 0.1 /ΜL
EOSINOPHILS RELATIVE PERCENT: 2.4 %
GFR CALCULATED: NORMAL
GLUCOSE BLD-MCNC: 98 MG/DL
HCT VFR BLD CALC: 37.7 % (ref 36–46)
HDLC SERPL-MCNC: 79 MG/DL (ref 35–70)
HEMOGLOBIN: 12.6 G/DL (ref 12–16)
LDL CHOLESTEROL CALCULATED: 92 MG/DL (ref 0–160)
LYMPHOCYTES ABSOLUTE: 2.2 /ΜL
LYMPHOCYTES RELATIVE PERCENT: 37.5 %
MCH RBC QN AUTO: 32.1 PG
MCHC RBC AUTO-ENTMCNC: 33.4 G/DL
MCV RBC AUTO: 96 FL
MONOCYTES ABSOLUTE: 0.7 /ΜL
MONOCYTES RELATIVE PERCENT: 13 %
NEUTROPHILS ABSOLUTE: 2.6 /ΜL
NEUTROPHILS RELATIVE PERCENT: 45.7 %
NONHDLC SERPL-MCNC: ABNORMAL MG/DL
PDW BLD-RTO: 13.8 %
PLATELET # BLD: 254 K/ΜL
PMV BLD AUTO: 10.1 FL
POTASSIUM SERPL-SCNC: 4.6 MMOL/L
RBC # BLD: 3.92 10^6/ΜL
SODIUM BLD-SCNC: 136 MMOL/L
TOTAL PROTEIN: 7.4
TRIGL SERPL-MCNC: 77 MG/DL
VLDLC SERPL CALC-MCNC: 15 MG/DL
WBC # BLD: 5.8 10^3/ML

## 2021-10-22 DIAGNOSIS — E78.2 MIXED HYPERLIPIDEMIA: ICD-10-CM

## 2022-03-15 ENCOUNTER — TELEPHONE (OUTPATIENT)
Dept: FAMILY MEDICINE CLINIC | Age: 87
End: 2022-03-15

## 2022-03-15 RX ORDER — OMEPRAZOLE 20 MG/1
20 CAPSULE, DELAYED RELEASE ORAL
Qty: 90 CAPSULE | Refills: 1 | Status: SHIPPED | OUTPATIENT
Start: 2022-03-15 | End: 2022-10-07

## 2022-03-15 RX ORDER — NITROGLYCERIN 0.4 MG/1
0.4 TABLET SUBLINGUAL EVERY 5 MIN PRN
Qty: 25 TABLET | Refills: 5 | Status: SHIPPED | OUTPATIENT
Start: 2022-03-15

## 2022-03-15 NOTE — TELEPHONE ENCOUNTER
Called for clarification she needs refill of omeprazole and nitroglycerin what she has in her fridge has .     LV 3/17/2021

## 2022-03-15 NOTE — TELEPHONE ENCOUNTER
----- Message from Kirstinda Adamson sent at 3/15/2022 10:29 AM EDT -----  Subject: Message to Provider    QUESTIONS  Information for Provider? pharm was supposed to call dr Mae Terry about her   Ometripol medication. pt hasnt hear anything back. needs a prior auth on   that please give pt a call.   ---------------------------------------------------------------------------  --------------  CALL BACK INFO  What is the best way for the office to contact you? OK to leave message on   voicemail  Preferred Call Back Phone Number? [phone]  ---------------------------------------------------------------------------  --------------  SCRIPT ANSWERS  Relationship to Patient?  Self

## 2022-03-15 NOTE — TELEPHONE ENCOUNTER
I do not see any hx of omeprazole on her med hx? What dose and how often is she taking it?  I sent nitro

## 2022-05-02 ENCOUNTER — OFFICE VISIT (OUTPATIENT)
Dept: PRIMARY CARE CLINIC | Age: 87
End: 2022-05-02
Payer: MEDICARE

## 2022-05-02 VITALS
BODY MASS INDEX: 23.36 KG/M2 | DIASTOLIC BLOOD PRESSURE: 87 MMHG | OXYGEN SATURATION: 94 % | TEMPERATURE: 97.6 F | SYSTOLIC BLOOD PRESSURE: 115 MMHG | HEIGHT: 60 IN | HEART RATE: 81 BPM | WEIGHT: 119 LBS

## 2022-05-02 DIAGNOSIS — Z20.822 CONTACT WITH AND (SUSPECTED) EXPOSURE TO COVID-19: ICD-10-CM

## 2022-05-02 DIAGNOSIS — J40 BRONCHITIS: Primary | ICD-10-CM

## 2022-05-02 PROCEDURE — 99213 OFFICE O/P EST LOW 20 MIN: CPT

## 2022-05-02 RX ORDER — LIDOCAINE HYDROCHLORIDE 10 MG/ML
8 INJECTION, SOLUTION INFILTRATION; PERINEURAL
COMMUNITY

## 2022-05-02 RX ORDER — LOSARTAN POTASSIUM 50 MG/1
TABLET ORAL
COMMUNITY
End: 2022-06-17 | Stop reason: SDUPTHER

## 2022-05-02 RX ORDER — OMEPRAZOLE 20 MG/1
CAPSULE, DELAYED RELEASE ORAL
COMMUNITY
End: 2022-10-07 | Stop reason: SDUPTHER

## 2022-05-02 RX ORDER — TRIAMCINOLONE ACETONIDE 10 MG/ML
6 INJECTION, SUSPENSION INTRA-ARTICULAR; INTRALESIONAL
COMMUNITY

## 2022-05-02 RX ORDER — BENZONATATE 100 MG/1
100 CAPSULE ORAL 3 TIMES DAILY PRN
Qty: 21 CAPSULE | Refills: 0 | Status: SHIPPED | OUTPATIENT
Start: 2022-05-02 | End: 2022-05-09

## 2022-05-02 RX ORDER — POTASSIUM CHLORIDE 1.5 G/1.77G
POWDER, FOR SOLUTION ORAL
COMMUNITY

## 2022-05-02 RX ORDER — DOXYCYCLINE HYCLATE 100 MG
100 TABLET ORAL 2 TIMES DAILY
Qty: 14 TABLET | Refills: 0 | Status: SHIPPED | OUTPATIENT
Start: 2022-05-02 | End: 2022-05-09

## 2022-05-02 ASSESSMENT — PATIENT HEALTH QUESTIONNAIRE - PHQ9
SUM OF ALL RESPONSES TO PHQ9 QUESTIONS 1 & 2: 0
SUM OF ALL RESPONSES TO PHQ QUESTIONS 1-9: 0
2. FEELING DOWN, DEPRESSED OR HOPELESS: 0
SUM OF ALL RESPONSES TO PHQ QUESTIONS 1-9: 0
1. LITTLE INTEREST OR PLEASURE IN DOING THINGS: 0

## 2022-05-02 ASSESSMENT — ENCOUNTER SYMPTOMS
PHOTOPHOBIA: 0
ABDOMINAL PAIN: 0
EYE PAIN: 0
TROUBLE SWALLOWING: 0
EYE ITCHING: 0
DIARRHEA: 0
EYE DISCHARGE: 0
FACIAL SWELLING: 0
HEARTBURN: 0
BLOOD IN STOOL: 0
EYE REDNESS: 0
VOICE CHANGE: 0
ABDOMINAL DISTENTION: 0
COLOR CHANGE: 0
BACK PAIN: 0
SORE THROAT: 0
CHEST TIGHTNESS: 0
RHINORRHEA: 0
CHOKING: 0
SINUS PAIN: 0
STRIDOR: 0
RECTAL PAIN: 0
NAUSEA: 0
SINUS PRESSURE: 0
HEMOPTYSIS: 0
CONSTIPATION: 0
WHEEZING: 0
GASTROINTESTINAL NEGATIVE: 1
COUGH: 1
VOMITING: 0
ANAL BLEEDING: 0
APNEA: 0
EYES NEGATIVE: 1
SHORTNESS OF BREATH: 0

## 2022-05-02 NOTE — PROGRESS NOTES
[address] [place] [facility] [id number] [place] [address] WALK IN CARE  [address]  [place] [address]  Dept: [phone]    Yue Reilly is a 82 y.o. female Established patient, who presents to the walk-in clinic today with conditions/complaints as noted below:    Chief Complaint   Patient presents with    Congestion     pt has been having cough and congestion          HPI:     Cough  This is a new problem. Episode onset: 5 days ago. The problem has been gradually worsening. The problem occurs constantly. The cough is productive of sputum. Associated symptoms include nasal congestion. Pertinent negatives include no chest pain, chills, ear congestion, ear pain, eye redness, fever, headaches, heartburn, hemoptysis, myalgias, postnasal drip, rash, rhinorrhea, sore throat, shortness of breath, sweats, weight loss or wheezing. Nothing aggravates the symptoms. Treatments tried: Zuri seltzer plus, delsym, tea+honey. The treatment provided mild relief.         Past Medical History:   Diagnosis Date    [facility]el obstruction ([facility])     Broken hip ([address]     Diverticulitis     DVT (deep venous thrombosis) ([facility])     Heart attack ([facility])     MRSA (methicillin resistant staph aureus) culture positive        Current Outpatient Medications   Medication Sig Dispense Refill    doxycycline hyclate (VIBRA-TABS) 100 MG tablet Take 1 tablet by mouth 2 times daily for 7 days 14 tablet 0    benzonatate (TESSALON PERLES) 100 MG capsule Take 1 capsule by mouth 3 times daily as needed for Cough 21 capsule 0    lidocaine 1 % injection 8 mLs      potassium chloride (KLOR-CON) 20 MEQ packet potassium chloride 20 mEq oral packet      triamcinolone acetonide (KENALOG) 10 MG/ML injection 6 mLs      losartan (COZAAR) 50 MG tablet losartan 50 mg tablet      omeprazole (PRILOSEC) 20 MG delayed release capsule omeprazole 20 mg capsule,delayed release      nitroGLYCERIN (NITROSTAT) 0.4 MG SL tablet Place 1 tablet under the tongue every 5 minutes as needed for Chest pain 25 tablet 5    omeprazole (PRILOSEC) 20 MG delayed release capsule Take 1 capsule by mouth every morning (before breakfast) 90 capsule 1    furosemide (LASIX) 40 MG tablet Take 1 tablet by mouth 2 times daily 60 tablet 5    atorvastatin (LIPITOR) 80 MG tablet Take 1 tablet by mouth daily 90 tablet 1    potassium chloride (KLOR-CON M) 10 MEQ extended release tablet TAKE TWO TABLETS BY MOUTH DAILY 60 tablet 2    latanoprost (XALATAN) 0.005 % ophthalmic solution Place 1 drop into both eyes daily 1 Bottle 3    aspirin 81 MG tablet Take 81 mg by mouth      brimonidine (ALPHAGAN) 0.2 % ophthalmic solution Apply 1 drop to eye      carvedilol (COREG) 3.125 MG tablet Take 3.125 mg by mouth      losartan (COZAAR) 25 MG tablet Take 25 mg by mouth      ibandronate (BONIVA) 150 MG tablet Take 1 tablet by mouth every 30 days 30 tablet 5     No current facility-administered medications for this visit. No Known Allergies    Review of Systems:     Review of Systems   Constitutional: Positive for fatigue. Negative for activity change, appetite change, chills, diaphoresis, fever, unexpected weight change and weight loss. HENT: Positive for congestion. Negative for dental problem, drooling, ear discharge, ear pain, facial swelling, hearing loss, mouth sores, nosebleeds, postnasal drip, rhinorrhea, sinus pressure, sinus pain, sneezing, sore throat, tinnitus, trouble swallowing and voice change. Eyes: Negative. Negative for photophobia, pain, discharge, redness, itching and visual disturbance. Respiratory: Positive for cough (dry). Negative for apnea, hemoptysis, choking, chest tightness, shortness of breath, wheezing and stridor. Cardiovascular: Negative. Negative for chest pain, palpitations and leg swelling. Gastrointestinal: Negative. Negative for abdominal distention, abdominal pain, anal bleeding, blood in stool, constipation, diarrhea, heartburn, nausea, rectal pain and vomiting. Musculoskeletal: Negative. Negative for arthralgias, back pain, gait problem, joint swelling, myalgias, neck pain and neck stiffness. Skin: Negative. Negative for color change, pallor, rash and wound. Neurological: Negative. Negative for dizziness, tremors, seizures, syncope, facial asymmetry, speech difficulty, weakness, light-headedness, numbness and headaches. Physical Exam:      /87 (Site: Left Upper Arm, Position: Sitting, Cuff Size: Large Adult)   Pulse 81   Temp 97.6 °F (36.4 °C) (Tympanic)   Ht 5' (1.524 m)   Wt 119 lb (54 kg)   SpO2 94%   Breastfeeding No   BMI 23.24 kg/m²     Physical Exam  Vitals reviewed. Constitutional:       Appearance: Normal appearance. She is normal weight. HENT:      Head: Normocephalic and atraumatic. Right Ear: Tympanic membrane, ear canal and external ear normal.      Left Ear: Tympanic membrane, ear canal and external ear normal.      Nose: Rhinorrhea present. No congestion. Mouth/Throat:      Lips: Pink. Mouth: Mucous membranes are moist.      Pharynx: Uvula midline. Oropharyngeal exudate (post nasal drainage) and posterior oropharyngeal erythema present. No pharyngeal swelling or uvula swelling. Tonsils: No tonsillar exudate or tonsillar abscesses. Eyes:      Extraocular Movements: Extraocular movements intact. Conjunctiva/sclera: Conjunctivae normal.      Pupils: Pupils are equal, round, and reactive to light. Cardiovascular:      Rate and Rhythm: Normal rate and regular rhythm. Pulses: Normal pulses. Heart sounds: Normal heart sounds. Pulmonary:      Effort: Pulmonary effort is normal.      Breath sounds: Normal air entry. Examination of the right-upper field reveals decreased breath sounds.  Examination of the left-upper field reveals decreased breath sounds. Examination of the right-lower field reveals decreased breath sounds. Examination of the left-lower field reveals decreased breath sounds. Decreased breath sounds present. Abdominal:      General: Abdomen is flat. Bowel sounds are normal.      Palpations: Abdomen is soft. Musculoskeletal:         General: Normal range of motion. Cervical back: Normal range of motion and neck supple. Skin:     General: Skin is warm and dry. Capillary Refill: Capillary refill takes less than 2 seconds. Neurological:      General: No focal deficit present. Mental Status: She is alert and oriented to person, place, and time. Mental status is at baseline. Psychiatric:         Mood and Affect: Mood normal.         Behavior: Behavior normal.         Plan:          1. Bronchitis  -     doxycycline hyclate (VIBRA-TABS) 100 MG tablet; Take 1 tablet by mouth 2 times daily for 7 days, Disp-14 tablet, R-0Normal  -     benzonatate (TESSALON PERLES) 100 MG capsule; Take 1 capsule by mouth 3 times daily as needed for Cough, Disp-21 capsule, R-0Normal  2. Contact with and (suspected) exposure to covid-19  -     COVID-19    Continue over-the-counter medications as needed for symptoms. Use honey to the back of throat, salt water gargle as needed for sore throat, cough. Go to the ER for shortness of breath, chest pain. Patient verbalized understanding. Follow Up Instructions:      Return if symptoms worsen or fail to improve, for SOB, chest pain go to ER. Orders Placed This Encounter   Medications    doxycycline hyclate (VIBRA-TABS) 100 MG tablet     Sig: Take 1 tablet by mouth 2 times daily for 7 days     Dispense:  14 tablet     Refill:  0    benzonatate (TESSALON PERLES) 100 MG capsule     Sig: Take 1 capsule by mouth 3 times daily as needed for Cough     Dispense:  21 capsule     Refill:  0           Will send out COVID19 testing.  Possible treatment alterations based on the results. Patient instructed to self-quarantine until testing results are back. Patient instructed not to return to work until results are back. Tylenol as needed for fever/pain. Encouraged adequate hydration and rest.  The patient indicates understanding of these issues and agrees with the plan. Educational materials provided on AVS.  Follow up if symptoms do not improve/worsen. Discussed symptoms that will warrant urgent ED evaluation/treatment. Patient and/or parent given educational materials - see patient instructions. Discussed use, benefit, and side effects of prescribed medications. All patient questions answered. Patient and/or parent voiced understanding.       Electronically signed by FLORIAN Artis 5/2/2022 at 3:48 PM

## 2022-05-02 NOTE — PATIENT INSTRUCTIONS
Patient Education        Learning About Coronavirus [phone])  What is coronavirus (COVID-19)? COVID-19 is a disease caused by a type of coronavirus. This illness was firstfound in December 2019. It has since spread worldwide. Coronaviruses are a large group of viruses. They cause the common cold. They also cause more serious illnesses like [place] East respiratory syndrome (MERS) and severe acute respiratory syndrome (SARS). COVID-19 is caused by a novelcoronavirus. That means it's a new type that has not been seen in people before. What are the symptoms? COVID-19 symptoms may include:   Fever.  Cough.  Trouble breathing.  Chills or repeated shaking with chills.  Muscle and body aches.  Headache.  Sore throat.  New loss of taste or smell.  Vomiting.  Diarrhea. In severe cases, COVID-19 can cause pneumonia and make it hard to breathewithout help from a machine. It can cause death. How is it diagnosed? COVID-19 is diagnosed with a viral test. This may also be called a PCR test or antigen test. It looks for evidence of the virus in your breathing passages orlungs (respiratory system). The test is most often done on a sample from the nose, throat, or lungs. It's sometimes done on a sample of saliva. One way a sample is collected is byputting a long swab into the back of your nose. If you have questions about COVID-19 testing, ask your doctor or go to cdc.govto use the COVID-19 Viral Testing Tool. How is it treated? Mild cases of COVID-19 can be treated at home. Serious cases need treatment in the hospital. Treatment may include medicines to reduce symptoms, plus breathing support such as oxygen therapy or a ventilator. Some people may beplaced on their belly to help their oxygen levels. Treatments that may help people who have COVID-19 include:  Antiviral medicines. These medicines treat viral infections. Immune-based therapy. These medicines help the immune system fight COVID-19. Examples include monoclonal antibodies. Blood thinners. These medicines help prevent blood clots. People with severe illness are at risk for blood clots. How can you protect yourself and others?  Get vaccinated and boosted.  Avoid sick people and stay away from others if you are sick.  Stay at least 6 feet away from other people.  Avoid crowds, especially inside.  Get tested for COVID-19 before you have an indoor visit with people that don't live with you.  Cover your mouth with a tissue when you cough or sneeze.  Wash your hands often, especially after you cough or sneeze. Use soap and water, and scrub for at least 20 seconds. If soap and water aren't available, use an alcohol-based hand .  Avoid touching your mouth, nose, and eyes. Be sure to follow all instructions from the [facility] and your local health authorities. Here are some examples of specific precautions you may need totake.  If you are not fully vaccinated and boosted:  ? Wear a mask if you have to go to public areas.  Even if you're fully vaccinated and boosted, there's still a chance you can get and spread COVID-19. If you live in an area where COVID-19 is spreading quickly, wear a mask if you have to go to indoor public areas. You might also want to wear a mask in crowded outdoor areas as well as public indoor spaces if you:  ? Have certain health conditions. ? Live with someone who has a compromised immune system. ? Live with someone who is not fully vaccinated.  If you have been exposed to the virus and are not fully vaccinated and boosted:  ? Talk to your doctor as soon as you can. Your doctor might have you take medicine to help prevent serious illness. ? Get a COVID-19 test. You may need to be tested more than once. ? Stay home. Try to separate from other people where you live. Don't go to school, work, or public areas. ? Wear a mask around other people for a full 10 days.  Avoid travel and stay away from people at high risk for serious illness. ? Watch for symptoms.  If you have been exposed and you are fully vaccinated and boosted:  ? Talk to your doctor as soon as you can. Your doctor may have you take medicine to help prevent serious illness. ? Get a COVID-19 test. You may need to be tested more than once. ? Wear a mask around other people for a full 10 days. Avoid travel and stay away from people at high risk for serious illness. ? Watch for symptoms. If you're sick or test positive for COVID-19:   Talk to your doctor as soon as you can. Your doctor may have you take medicine to help prevent serious illness.  Get a COVID-19 test unless you have already been tested. You may need to be tested more than once.  Stay home. Leave only if you need to get medical care.  Wear a mask whenever you're around other people for a full 10 days.  Avoid travel and stay away from people at high risk for serious illness.  Limit contact with pets and people in your home. If possible, stay in a separate bedroom and use a separate bathroom.  Clean and disinfect your home every day. Use household  and disinfectant wipes or sprays. Take special care to clean things that you touch with your hands. If you have questions about COVID-19 testing, ask your doctor or go to cdc.govto use the COVID-19 Viral Testing Tool. How can you self-isolate when you have COVID-19? If you have COVID-19, there are things you can do to help avoid spreading thevirus to others.  Limit contact with people in your home. If possible, stay in a separate bedroom and use a separate bathroom.  Wear a mask when you are around other people.  If you have to leave home, avoid crowds and try to stay at least 6 feet away from other people.  Avoid contact with pets and other animals.  Cover your mouth and nose with a tissue when you cough or sneeze. Then throw it in the trash right away.    Wash your hands often, especially after you cough or sneeze. Use soap and water, and scrub for at least 20 seconds. If soap and water aren't available, use an alcohol-based hand .  Don't share personal household items. These include bedding, towels, cups and glasses, and eating utensils. [address] in the warmest water allowed for the fabric type, and dry it completely. It's okay to wash other people's laundry with yours.  Clean and disinfect your home. Use household  and disinfectant wipes or sprays. When should you call for help? Call 911 anytime you think you may need emergency care. For example, call if you have life-threatening symptoms, such as:     You have severe trouble breathing. (You can't talk at all.)      You have constant chest pain or pressure.      You are severely dizzy or lightheaded.      You are confused or can't think clearly.      You have pale, gray, or blue-colored skin or lips.      You pass out (lose consciousness) or are very hard to wake up.      You have loss of balance or trouble walking.      You have trouble seeing out of one or both eyes.      You have weakness or drooping on one side of the face.      You have weakness or numbness in an arm or a leg.      You have trouble speaking.      You have a severe headache.      You have a seizure. Call your doctor now or seek immediate medical care if:     You have moderate trouble breathing. (You can't speak a full sentence.)      You are coughing up blood.      You have signs of low blood pressure. These include feeling lightheaded; being too weak to stand; and having cold, pale, clammy skin. Watch closely for changes in your health, and be sure to contact your doctor if:     Your symptoms get worse.      You are not getting better as expected.      You have new or worse symptoms of anxiety, depression, nightmares, or flashbacks. Call before you go to the doctor's office. Follow their instructions. And wear a mask.   Where can you learn more?  Go to [email or web address] and sign in to your [employer] account. Enter C008 in the [facility] box to learn more about \"Learning About Coronavirus (COVID-19). \"     If you do not have an account, please click on the \"Sign Up Now\" link. Current as of: July 1, 2021               Content Version: 13.2  © 2006-2022 Healthwise, Incorporated. Care instructions adapted under license by [facility] ([facility]). If you have questions about a medical condition or this instruction, always ask your healthcare professional. [address] any warranty or liability for your use of this information.

## 2022-05-03 ENCOUNTER — HOSPITAL ENCOUNTER (OUTPATIENT)
Age: 87
Setting detail: SPECIMEN
Discharge: HOME | End: 2022-05-03

## 2022-05-04 LAB
SARS-COV-2: NORMAL
SARS-COV-2: NOT DETECTED
SOURCE: NORMAL

## 2022-06-10 ENCOUNTER — TELEPHONE (OUTPATIENT)
Dept: FAMILY MEDICINE CLINIC | Age: 87
End: 2022-06-10

## 2022-06-10 NOTE — TELEPHONE ENCOUNTER
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     LV 3/17/2021

## 2022-06-11 NOTE — TELEPHONE ENCOUNTER
Recommend patient discuss with GI or her surgeon. Find out who she is seeing.  Has not been in our office in over a year

## 2022-06-17 RX ORDER — LOSARTAN POTASSIUM 50 MG/1
TABLET ORAL
Qty: 30 TABLET | Refills: 2 | Status: SHIPPED | OUTPATIENT
Start: 2022-06-17 | End: 2022-09-06

## 2022-06-17 RX ORDER — LOSARTAN POTASSIUM 25 MG/1
25 TABLET ORAL DAILY
Qty: 30 TABLET | Refills: 2 | OUTPATIENT
Start: 2022-06-17

## 2022-06-23 ENCOUNTER — OFFICE VISIT (OUTPATIENT)
Dept: FAMILY MEDICINE CLINIC | Age: 87
End: 2022-06-23
Payer: MEDICARE

## 2022-06-23 VITALS
TEMPERATURE: 97.8 F | HEIGHT: 60 IN | OXYGEN SATURATION: 94 % | WEIGHT: 143.8 LBS | HEART RATE: 80 BPM | DIASTOLIC BLOOD PRESSURE: 74 MMHG | BODY MASS INDEX: 28.23 KG/M2 | SYSTOLIC BLOOD PRESSURE: 126 MMHG

## 2022-06-23 DIAGNOSIS — L98.9 SKIN LESION: ICD-10-CM

## 2022-06-23 DIAGNOSIS — Z00.00 MEDICARE ANNUAL WELLNESS VISIT, SUBSEQUENT: Primary | ICD-10-CM

## 2022-06-23 DIAGNOSIS — I10 ESSENTIAL HYPERTENSION: ICD-10-CM

## 2022-06-23 DIAGNOSIS — E78.2 MIXED HYPERLIPIDEMIA: ICD-10-CM

## 2022-06-23 PROCEDURE — G0439 PPPS, SUBSEQ VISIT: HCPCS | Performed by: NURSE PRACTITIONER

## 2022-06-23 PROCEDURE — 1123F ACP DISCUSS/DSCN MKR DOCD: CPT | Performed by: NURSE PRACTITIONER

## 2022-06-23 SDOH — ECONOMIC STABILITY: FOOD INSECURITY: WITHIN THE PAST 12 MONTHS, THE FOOD YOU BOUGHT JUST DIDN'T LAST AND YOU DIDN'T HAVE MONEY TO GET MORE.: NEVER TRUE

## 2022-06-23 SDOH — ECONOMIC STABILITY: FOOD INSECURITY: WITHIN THE PAST 12 MONTHS, YOU WORRIED THAT YOUR FOOD WOULD RUN OUT BEFORE YOU GOT MONEY TO BUY MORE.: NEVER TRUE

## 2022-06-23 ASSESSMENT — PATIENT HEALTH QUESTIONNAIRE - PHQ9
SUM OF ALL RESPONSES TO PHQ QUESTIONS 1-9: 0
1. LITTLE INTEREST OR PLEASURE IN DOING THINGS: 0
2. FEELING DOWN, DEPRESSED OR HOPELESS: 0
SUM OF ALL RESPONSES TO PHQ9 QUESTIONS 1 & 2: 0
SUM OF ALL RESPONSES TO PHQ QUESTIONS 1-9: 0

## 2022-06-23 ASSESSMENT — LIFESTYLE VARIABLES
HOW MANY STANDARD DRINKS CONTAINING ALCOHOL DO YOU HAVE ON A TYPICAL DAY: 1 OR 2
HOW OFTEN DO YOU HAVE A DRINK CONTAINING ALCOHOL: 2-3 TIMES A WEEK

## 2022-06-23 ASSESSMENT — SOCIAL DETERMINANTS OF HEALTH (SDOH): HOW HARD IS IT FOR YOU TO PAY FOR THE VERY BASICS LIKE FOOD, HOUSING, MEDICAL CARE, AND HEATING?: NOT HARD AT ALL

## 2022-06-23 NOTE — PROGRESS NOTES
Visit Information    Have you changed or started any medications since your last visit including any over-the-counter medicines, vitamins, or herbal medicines? no   Are you having any side effects from any of your medications? -  no  Have you stopped taking any of your medications? Is so, why? -  no    Have you seen any other physician or provider since your last visit? No  Have you had any other diagnostic tests since your last visit? No  Have you been seen in the emergency room and/or had an admission to a hospital since we last saw you? No  Have you had your routine dental cleaning in the past 6 months? no    Have you activated your [employer] account? If not, what are your barriers?  Yes     Patient Care Team:  FLORIAN Ventura CNP as PCP - General (Family Nurse Practitioner)  FLORIAN Ventura CNP as PCP - [facility] Provider    Medical History Review  Past Medical, Family, and Social History reviewed and does contribute to the patient presenting condition    Health Maintenance   Topic Date Due    DEXA (modify frequency per FRAX score)  Never done    COVID-19 Vaccine (3 - Booster for [place] Peter series) 07/04/2021    Annual Wellness Visit (AWV)  03/18/2022    Shingles vaccine (3 of 3) 04/07/2022    Flu vaccine (Season Ended) 09/01/2022    Lipids  10/20/2022    Depression Screen  05/02/2023    DTaP/Tdap/Td vaccine (2 - Td or Tdap) 10/31/2026    Pneumococcal 65+ years Vaccine  Completed    Hepatitis A vaccine  Aged Out    Hepatitis B vaccine  Aged Out    Hib vaccine  Aged Out    Meningococcal (ACWY) vaccine  Aged Out

## 2022-06-23 NOTE — PATIENT INSTRUCTIONS
Personalized Preventive Plan for Tracy Gamez - 6/23/2022  Medicare offers a range of preventive health benefits. Some of the tests and screenings are paid in full while other may be subject to a deductible, co-insurance, and/or copay. Some of these benefits include a comprehensive review of your medical history including lifestyle, illnesses that may run in your family, and various assessments and screenings as appropriate. After reviewing your medical record and screening and assessments performed today your provider may have ordered immunizations, labs, imaging, and/or referrals for you. A list of these orders (if applicable) as well as your Preventive Care list are included within your After Visit Summary for your review. Other Preventive Recommendations:    · A preventive eye exam performed by an eye specialist is recommended every 1-2 years to screen for glaucoma; cataracts, macular degeneration, and other eye disorders. · A preventive dental visit is recommended every 6 months. · Try to get at least 150 minutes of exercise per week or 10,000 steps per day on a pedometer . · Order or download the FREE \"Exercise & Physical Activity: Your Everyday Guide\" from [employer]. Call 6-341.383.7446 or search [employer] online. · You need 5282-0144 mg of calcium and 9899-0843 IU of vitamin D per day. It is possible to meet your calcium requirement with diet alone, but a vitamin D supplement is usually necessary to meet this goal.  · When exposed to the sun, use a sunscreen that protects against both UVA and UVB radiation with an SPF of 30 or greater. Reapply every 2 to 3 hours or after sweating, drying off with a towel, or swimming. · Always wear a seat belt when traveling in a car. Always wear a helmet when riding a bicycle or motorcycle.

## 2022-06-23 NOTE — PROGRESS NOTES
Medicare Annual Wellness Visit    Emily Sprague is here for Medicare AWV    Assessment & Plan   Medicare annual wellness visit, subsequent      Recommendations for Preventive Services Due: see orders and patient instructions/AVS.  Recommended screening schedule for the next 5-10 years is provided to the patient in written form: see Patient Instructions/AVS.     Return for Medicare Annual Wellness Visit in 1 year. Subjective     Has concerns for skin lesion to right sided upper chest. Raised, brown, rounded with rough portion. Has no pain or swelling. No warmth or redness surrounding. Patient's complete Health Risk Assessment and screening values have been reviewed and are found in Flowsheets. The following problems were reviewed today and where indicated follow up appointments were made and/or referrals ordered.     Positive Risk Factor Screenings with Interventions:             General Health and ACP:  General  In general, how would you say your health is?: Good  In the past 7 days, have you experienced any of the following: New or Increased Pain, New or Increased Fatigue, Loneliness, Social Isolation, Stress or Anger?: No  Do you get the social and emotional support that you need?: Yes  Do you have a Living Will?: Yes    Advance Directives     Power of  Living Will ACP-Advance Directive ACP-Power of     Not on File Not on File Not on File Not on File      General Health Risk Interventions:  · No Living Will: ACP documents already completed- patient asked to provide copy to the office       ADLs:  In the past 7 days, did you need help from others to perform any of the following everyday activities: Eating, dressing, grooming, bathing, toileting, or walking/balance?: No  In the past 7 days, did you need help from others to take care of any of the following: Laundry, housekeeping, banking/finances, shopping, telephone use, food preparation, transportation, or taking medications?: (!) Yes  Select all that apply: (!) Transportation    ADL Interventions:  · transportation from daughter and grand kids, full supports system          Objective   Vitals:    06/23/22 1549   BP: 126/74   Site: Right Upper Arm   Position: Sitting   Cuff Size: Medium Adult   Pulse: 80   Temp: 97.8 °F (36.6 °C)   TempSrc: Tympanic   SpO2: 94%   Weight: 143 lb 12.8 oz (65.2 kg)   Height: 5' (1.524 m)      Body mass index is 28.08 kg/m². No Known Allergies  Prior to Visit Medications    Medication Sig Taking?  Authorizing Provider   losartan (COZAAR) 50 MG tablet losartan 50 mg tablet Yes Maira Angulo PA-C   lidocaine 1 % injection 8 mLs Yes Historical Provider, MD   potassium chloride (KLOR-CON) 20 MEQ packet potassium chloride 20 mEq oral packet Yes Historical Provider, MD   triamcinolone acetonide (KENALOG) 10 MG/ML injection 6 mLs Yes Historical Provider, MD   omeprazole (PRILOSEC) 20 MG delayed release capsule omeprazole 20 mg capsule,delayed release Yes Historical Provider, MD   nitroGLYCERIN (NITROSTAT) 0.4 MG SL tablet Place 1 tablet under the tongue every 5 minutes as needed for Chest pain Yes FLORIAN Henry CNP   omeprazole (PRILOSEC) 20 MG delayed release capsule Take 1 capsule by mouth every morning (before breakfast) Yes FLORIAN Henry CNP   furosemide (LASIX) 40 MG tablet Take 1 tablet by mouth 2 times daily Yes FLORIAN Henry CNP   atorvastatin (LIPITOR) 80 MG tablet Take 1 tablet by mouth daily Yes FLORIAN Henry CNP   potassium chloride (KLOR-CON M) 10 MEQ extended release tablet TAKE TWO TABLETS BY MOUTH DAILY Yes FLORIAN Henry CNP   latanoprost (XALATAN) 0.005 % ophthalmic solution Place 1 drop into both eyes daily Yes FLORIAN Henry CNP   aspirin 81 MG tablet Take 81 mg by mouth Yes Historical Provider, MD   brimonidine (ALPHAGAN) 0.2 % ophthalmic solution Apply 1 drop to eye Yes Historical Provider, MD   carvedilol (COREG) 3.125 MG tablet Take 3.125 mg by mouth Yes Historical Provider, MD   ibandronate (BONIVA) 150 MG tablet Take 1 tablet by mouth every 30 days Yes DO Ada Salas (Including outside providers/suppliers regularly involved in providing care):   Patient Care Team:  FLORIAN Katz CNP as PCP - General (Family Nurse Practitioner)  Will FLORIAN Kwok CNP as PCP - [facility] Empaneled Provider     Reviewed and updated this visit:  Allergies  Meds

## 2022-07-05 DIAGNOSIS — J40 BRONCHITIS: ICD-10-CM

## 2022-07-05 RX ORDER — BENZONATATE 100 MG/1
CAPSULE ORAL
Qty: 21 CAPSULE | Refills: 0 | OUTPATIENT
Start: 2022-07-05

## 2022-07-05 RX ORDER — DOXYCYCLINE HYCLATE 100 MG
TABLET ORAL
Qty: 14 TABLET | Refills: 0 | OUTPATIENT
Start: 2022-07-05

## 2022-08-01 DIAGNOSIS — Z76.0 MEDICATION REFILL: ICD-10-CM

## 2022-08-01 RX ORDER — POTASSIUM CHLORIDE 750 MG/1
TABLET, EXTENDED RELEASE ORAL
Qty: 60 TABLET | Refills: 2 | Status: SHIPPED | OUTPATIENT
Start: 2022-08-01

## 2022-08-29 ENCOUNTER — OFFICE VISIT (OUTPATIENT)
Dept: FAMILY MEDICINE CLINIC | Age: 87
End: 2022-08-29
Payer: MEDICARE

## 2022-08-29 VITALS
HEART RATE: 83 BPM | SYSTOLIC BLOOD PRESSURE: 136 MMHG | TEMPERATURE: 97.9 F | HEIGHT: 60 IN | WEIGHT: 142 LBS | DIASTOLIC BLOOD PRESSURE: 80 MMHG | OXYGEN SATURATION: 92 % | BODY MASS INDEX: 27.88 KG/M2

## 2022-08-29 DIAGNOSIS — S81.801A LEG WOUND, RIGHT, INITIAL ENCOUNTER: ICD-10-CM

## 2022-08-29 DIAGNOSIS — S81.802A LEG WOUND, LEFT, INITIAL ENCOUNTER: Primary | ICD-10-CM

## 2022-08-29 DIAGNOSIS — I87.8 VENOUS STASIS OF LOWER EXTREMITY: ICD-10-CM

## 2022-08-29 PROCEDURE — 99213 OFFICE O/P EST LOW 20 MIN: CPT | Performed by: NURSE PRACTITIONER

## 2022-08-29 PROCEDURE — 1123F ACP DISCUSS/DSCN MKR DOCD: CPT | Performed by: NURSE PRACTITIONER

## 2022-08-29 RX ORDER — CEPHALEXIN 500 MG/1
500 CAPSULE ORAL 3 TIMES DAILY
Qty: 30 CAPSULE | Refills: 0 | Status: SHIPPED | OUTPATIENT
Start: 2022-08-29 | End: 2022-09-08

## 2022-08-29 ASSESSMENT — ENCOUNTER SYMPTOMS
NAUSEA: 0
VOMITING: 0
SHORTNESS OF BREATH: 0
COUGH: 0
CHEST TIGHTNESS: 0
ABDOMINAL PAIN: 0

## 2022-08-29 NOTE — PROGRESS NOTES
Visit Information    Have you changed or started any medications since your last visit including any over-the-counter medicines, vitamins, or herbal medicines? no   Have you stopped taking any of your medications? Is so, why? -  no  Are you having any side effects from any of your medications? - no    Have you seen any other physician or provider since your last visit?  no   Have you had any other diagnostic tests since your last visit?  no   Have you been seen in the emergency room and/or had an admission in a hospital since we last saw you?  no   Have you had your routine dental cleaning in the past 6 months?  no     Do you have an active MyChart account? If no, what is the barrier?   No: na    Patient Care Team:  FLORIAN Grimaldo CNP as PCP - General (Family Nurse Practitioner)  FLORIAN Grimaldo CNP as PCP - [facility] Provider    Medical History Review  Past Medical, Family, and Social History reviewed and  contribute to the patient presenting condition    Health Maintenance   Topic Date Due    DEXA (modify frequency per FRAX score)  Never done    COVID-19 Vaccine (3 - Booster for [employer] series) 07/04/2021    Shingles vaccine (3 of 3) 04/07/2022    Flu vaccine (1) 09/01/2022    Lipids  10/20/2022    Depression Screen  06/23/2023    Annual Wellness Visit (AWV)  06/24/2023    DTaP/Tdap/Td vaccine (2 - Td or Tdap) 10/31/2026    Pneumococcal 65+ years Vaccine  Completed    Hepatitis A vaccine  Aged Out    Hepatitis B vaccine  Aged Out    Hib vaccine  Aged Out    Meningococcal (ACWY) vaccine  Aged Out

## 2022-08-30 NOTE — PROGRESS NOTES
[address]  [place], [address]  [phone]      Malik Corea is a 82 y.o. female who presents today for her  medicalconditions/complaints as noted below. Malik Corea is c/o of Lesion(s) (Possible ulcers on bilateral calves,has had in past,was told in past to wear compression stockings,has forgotten to wear until today, sores look like they did when she had previous ulcers)  . HPI:    HPI  Pt here today with dtg. For evaluation of two wounds on the back of her legs. Started at the end of July after a leash got wrapped behind her legs and caused a sore. States she has had wounds like this before and have taken a very long time to heal. She denies fever, chills, joint pain or pain with walking. Does use a walker for ongoing hip pain. Past Medical History:   Diagnosis Date    Bowel obstruction ([address]     Broken hip ([address]     Diverticulitis     DVT (deep venous thrombosis) (HCC)     Heart attack ([facility])     MRSA (methicillin resistant staph aureus) culture positive       Past Surgical History:   Procedure Laterality Date    TOTAL HIP ARTHROPLASTY       History reviewed. No pertinent family history. Social History     Tobacco Use    Smoking status: Never    Smokeless tobacco: Never   Substance Use Topics    Alcohol use:  Yes     Alcohol/week: 3.0 standard drinks     Types: 3 Glasses of wine per week      Current Outpatient Medications   Medication Sig Dispense Refill    cephALEXin (KEFLEX) 500 MG capsule Take 1 capsule by mouth 3 times daily for 10 days 30 capsule 0    potassium chloride (KLOR-CON M) 10 MEQ extended release tablet TAKE TWO TABLETS BY MOUTH DAILY 60 tablet 2    losartan (COZAAR) 50 MG tablet losartan 50 mg tablet 30 tablet 2    lidocaine 1 % injection 8 mLs      potassium chloride (KLOR-CON) 20 MEQ packet potassium chloride 20 mEq oral packet      triamcinolone acetonide (KENALOG) 10 MG/ML injection 6 mLs      omeprazole (PRILOSEC) 20 MG delayed release capsule omeprazole 20 mg capsule,delayed release      nitroGLYCERIN (NITROSTAT) 0.4 MG SL tablet Place 1 tablet under the tongue every 5 minutes as needed for Chest pain 25 tablet 5    omeprazole (PRILOSEC) 20 MG delayed release capsule Take 1 capsule by mouth every morning (before breakfast) 90 capsule 1    furosemide (LASIX) 40 MG tablet Take 1 tablet by mouth 2 times daily 60 tablet 5    atorvastatin (LIPITOR) 80 MG tablet Take 1 tablet by mouth daily 90 tablet 1    latanoprost (XALATAN) 0.005 % ophthalmic solution Place 1 drop into both eyes daily 1 Bottle 3    aspirin 81 MG tablet Take 81 mg by mouth      brimonidine (ALPHAGAN) 0.2 % ophthalmic solution Apply 1 drop to eye      carvedilol (COREG) 3.125 MG tablet Take 3.125 mg by mouth      ibandronate (BONIVA) 150 MG tablet Take 1 tablet by mouth every 30 days 30 tablet 5     No current facility-administered medications for this visit. No Known Allergies    Health Maintenance   Topic Date Due    DEXA (modify frequency per FRAX score)  Never done    COVID-19 Vaccine (3 - Booster for Pfizer series) 07/04/2021    Shingles vaccine (3 of 3) 04/07/2022    Flu vaccine (1) 09/01/2022    Lipids  10/20/2022    Depression Screen  06/23/2023    Annual Wellness Visit (AWV)  06/24/2023    DTaP/Tdap/Td vaccine (2 - Td or Tdap) 10/31/2026    Pneumococcal 65+ years Vaccine  Completed    Hepatitis A vaccine  Aged Out    Hepatitis B vaccine  Aged Out    Hib vaccine  Aged Out    Meningococcal (ACWY) vaccine  Aged Out       Subjective:      Review of Systems   Constitutional:  Positive for activity change. Negative for appetite change, chills, diaphoresis, fatigue and fever. Eyes:  Negative for visual disturbance. Respiratory:  Negative for cough, chest tightness and shortness of breath. Cardiovascular:  Negative for chest pain, palpitations and leg swelling. Gastrointestinal:  Negative for abdominal pain, nausea and vomiting.    Musculoskeletal:  Positive for arthralgias (hips). Negative for joint swelling and myalgias. Skin:  Positive for wound. Negative for rash. Neurological:  Negative for dizziness and headaches. Hematological:  Negative for adenopathy. Psychiatric/Behavioral:  Negative for sleep disturbance. Objective:      Physical Exam  Vitals and nursing note reviewed. Constitutional:       Appearance: Normal appearance. She is normal weight. She is not ill-appearing or diaphoretic. HENT:      Head: Normocephalic and atraumatic. Mouth/Throat:      Mouth: Mucous membranes are moist.   Eyes:      Conjunctiva/sclera: Conjunctivae normal.   Cardiovascular:      Pulses: Normal pulses. Musculoskeletal:      Cervical back: Neck supple. Skin:     General: Skin is warm and dry. Capillary Refill: Capillary refill takes 2 to 3 seconds. Comments: Mx old healed scars to BLE from pervious wounds, circular wound on posterior right and left calf with purulent drainage and no necrosis. Intact pedal pulses and venous statis present bilaterally. Neurological:      General: No focal deficit present. Mental Status: She is alert and oriented to person, place, and time. Mental status is at baseline. Cranial Nerves: No cranial nerve deficit. Psychiatric:         Mood and Affect: Mood normal.         Behavior: Behavior normal.         Thought Content: Thought content normal.         Judgment: Judgment normal.       Assessment:       Diagnosis Orders   1. Leg wound, left, initial encounter  cephALEXin (KEFLEX) 500 MG capsule    [address]      2. Leg wound, right, initial encounter  cephALEXin (KEFLEX) 500 MG capsule    [address]      3. Venous stasis of lower extremity            Plan:      Return if symptoms worsen or fail to improve.   Orders Placed This Encounter   Procedures    [address]     Referral Priority:   Routine

## 2022-09-06 RX ORDER — LOSARTAN POTASSIUM 50 MG/1
TABLET ORAL
Qty: 90 TABLET | Refills: 2 | Status: SHIPPED | OUTPATIENT
Start: 2022-09-06

## 2022-10-07 RX ORDER — OMEPRAZOLE 20 MG/1
CAPSULE, DELAYED RELEASE ORAL
Qty: 90 CAPSULE | Refills: 1 | Status: SHIPPED | OUTPATIENT
Start: 2022-10-07

## 2022-10-19 RX ORDER — FUROSEMIDE 40 MG/1
TABLET ORAL
Qty: 60 TABLET | Refills: 5 | Status: SHIPPED | OUTPATIENT
Start: 2022-10-19

## 2022-10-22 DIAGNOSIS — E78.2 MIXED HYPERLIPIDEMIA: ICD-10-CM

## 2022-10-23 RX ORDER — ATORVASTATIN CALCIUM 80 MG/1
TABLET, FILM COATED ORAL
Qty: 30 TABLET | Refills: 5 | Status: SHIPPED | OUTPATIENT
Start: 2022-10-23

## 2022-11-11 ENCOUNTER — HOSPITAL ENCOUNTER (OUTPATIENT)
Age: 87
Setting detail: SPECIMEN
Discharge: HOME | End: 2022-11-11

## 2022-11-11 DIAGNOSIS — I10 ESSENTIAL HYPERTENSION: ICD-10-CM

## 2022-11-11 DIAGNOSIS — E78.2 MIXED HYPERLIPIDEMIA: ICD-10-CM

## 2022-11-11 DIAGNOSIS — Z00.00 MEDICARE ANNUAL WELLNESS VISIT, SUBSEQUENT: ICD-10-CM

## 2022-11-11 LAB
ABSOLUTE EOS #: 0.22 K/UL (ref 0–0.44)
ABSOLUTE IMMATURE GRANULOCYTE: <0.03 K/UL (ref 0–0.3)
ABSOLUTE LYMPH #: 2.03 K/UL (ref 1.1–3.7)
ABSOLUTE MONO #: 0.74 K/UL (ref 0.1–1.2)
ALBUMIN SERPL-MCNC: 4.5 G/DL (ref 3.5–5.2)
ALBUMIN/GLOBULIN RATIO: 1.4 (ref 1–2.5)
ALP BLD-CCNC: 105 U/L (ref 35–104)
ALT SERPL-CCNC: 10 U/L (ref 5–33)
ANION GAP SERPL CALCULATED.3IONS-SCNC: 14 MMOL/L (ref 9–17)
AST SERPL-CCNC: 20 U/L
BASOPHILS # BLD: 2 % (ref 0–2)
BASOPHILS ABSOLUTE: 0.11 K/UL (ref 0–0.2)
BILIRUB SERPL-MCNC: 0.6 MG/DL (ref 0.3–1.2)
BUN BLDV-MCNC: 36 MG/DL (ref 8–23)
CALCIUM SERPL-MCNC: 9.3 MG/DL (ref 8.6–10.4)
CHLORIDE BLD-SCNC: 100 MMOL/L (ref 98–107)
CHOLESTEROL/HDL RATIO: 2.2
CHOLESTEROL: 193 MG/DL
CO2: 25 MMOL/L (ref 20–31)
CREAT SERPL-MCNC: 1.08 MG/DL (ref 0.5–0.9)
EOSINOPHILS RELATIVE PERCENT: 4 % (ref 1–4)
GFR SERPL CREATININE-BSD FRML MDRD: 51 ML/MIN/1.73M2
GLUCOSE BLD-MCNC: 94 MG/DL (ref 70–99)
HCT VFR BLD CALC: 43.1 % (ref 36.3–47.1)
HDLC SERPL-MCNC: 86 MG/DL
HEMOGLOBIN: 13.4 G/DL (ref 11.9–15.1)
IMMATURE GRANULOCYTES: 0 %
LDL CHOLESTEROL: 90 MG/DL (ref 0–130)
LYMPHOCYTES # BLD: 33 % (ref 24–43)
MCH RBC QN AUTO: 30.8 PG (ref 25.2–33.5)
MCHC RBC AUTO-ENTMCNC: 31.1 G/DL (ref 28.4–34.8)
MCV RBC AUTO: 99.1 FL (ref 82.6–102.9)
MONOCYTES # BLD: 12 % (ref 3–12)
NRBC AUTOMATED: 0 PER 100 WBC
PDW BLD-RTO: 13.4 % (ref 11.8–14.4)
PLATELET # BLD: 274 K/UL (ref 138–453)
PMV BLD AUTO: 11.8 FL (ref 8.1–13.5)
POTASSIUM SERPL-SCNC: 4 MMOL/L (ref 3.7–5.3)
RBC # BLD: 4.35 M/UL (ref 3.95–5.11)
SEG NEUTROPHILS: 49 % (ref 36–65)
SEGMENTED NEUTROPHILS ABSOLUTE COUNT: 2.98 K/UL (ref 1.5–8.1)
SODIUM BLD-SCNC: 139 MMOL/L (ref 135–144)
TOTAL PROTEIN: 7.8 G/DL (ref 6.4–8.3)
TRIGL SERPL-MCNC: 87 MG/DL
WBC # BLD: 6.1 K/UL (ref 3.5–11.3)

## 2022-11-13 DIAGNOSIS — N28.9 DECREASED RENAL FUNCTION: Primary | ICD-10-CM

## 2022-11-18 NOTE — DISCHARGE INSTRUCTIONS
signature______________________________________Date:________  Electronically signed by Mallory Norris RN on 11/21/2022 at 9:00 AM   Electronically signed by Ortencia Soulier, MD on 11/21/2022 at 8:46 AM

## 2022-11-21 ENCOUNTER — HOSPITAL ENCOUNTER (OUTPATIENT)
Dept: WOUND CARE | Age: 87
Discharge: HOME | End: 2022-11-21
Payer: MEDICARE

## 2022-11-21 VITALS
TEMPERATURE: 97.1 F | WEIGHT: 138 LBS | SYSTOLIC BLOOD PRESSURE: 134 MMHG | DIASTOLIC BLOOD PRESSURE: 73 MMHG | BODY MASS INDEX: 27.09 KG/M2 | RESPIRATION RATE: 18 BRPM | HEIGHT: 60 IN

## 2022-11-21 DIAGNOSIS — S81.802A OPEN WOUND OF LEFT LOWER LEG, INITIAL ENCOUNTER: Primary | ICD-10-CM

## 2022-11-21 PROCEDURE — 99203 OFFICE O/P NEW LOW 30 MIN: CPT | Performed by: PLASTIC SURGERY

## 2022-11-21 PROCEDURE — 11042 DBRDMT SUBQ TIS 1ST 20SQCM/<: CPT

## 2022-11-21 PROCEDURE — 87176 TISSUE HOMOGENIZATION CULTR: CPT

## 2022-11-21 PROCEDURE — 87070 CULTURE OTHR SPECIMN AEROBIC: CPT

## 2022-11-21 PROCEDURE — 11042 DBRDMT SUBQ TIS 1ST 20SQCM/<: CPT | Performed by: PLASTIC SURGERY

## 2022-11-21 PROCEDURE — 87205 SMEAR GRAM STAIN: CPT

## 2022-11-21 PROCEDURE — 99213 OFFICE O/P EST LOW 20 MIN: CPT

## 2022-11-21 PROCEDURE — 87075 CULTR BACTERIA EXCEPT BLOOD: CPT

## 2022-11-21 RX ORDER — GINSENG 100 MG
CAPSULE ORAL ONCE
OUTPATIENT
Start: 2022-11-21 | End: 2022-11-21

## 2022-11-21 RX ORDER — LIDOCAINE HYDROCHLORIDE 20 MG/ML
JELLY TOPICAL ONCE
OUTPATIENT
Start: 2022-11-21 | End: 2022-11-21

## 2022-11-21 RX ORDER — LIDOCAINE 50 MG/G
OINTMENT TOPICAL ONCE
Status: CANCELLED | OUTPATIENT
Start: 2022-11-21 | End: 2022-11-21

## 2022-11-21 RX ORDER — GINSENG 100 MG
CAPSULE ORAL ONCE
Status: CANCELLED | OUTPATIENT
Start: 2022-11-21 | End: 2022-11-21

## 2022-11-21 RX ORDER — LIDOCAINE HYDROCHLORIDE 20 MG/ML
JELLY TOPICAL ONCE
Status: COMPLETED | OUTPATIENT
Start: 2022-11-21 | End: 2022-11-21

## 2022-11-21 RX ORDER — LIDOCAINE HYDROCHLORIDE 20 MG/ML
JELLY TOPICAL ONCE
Status: CANCELLED | OUTPATIENT
Start: 2022-11-21 | End: 2022-11-21

## 2022-11-21 RX ORDER — BACITRACIN ZINC AND POLYMYXIN B SULFATE 500; 1000 [USP'U]/G; [USP'U]/G
OINTMENT TOPICAL ONCE
OUTPATIENT
Start: 2022-11-21 | End: 2022-11-21

## 2022-11-21 RX ORDER — CLOBETASOL PROPIONATE 0.5 MG/G
OINTMENT TOPICAL ONCE
Status: CANCELLED | OUTPATIENT
Start: 2022-11-21 | End: 2022-11-21

## 2022-11-21 RX ORDER — CLOBETASOL PROPIONATE 0.5 MG/G
OINTMENT TOPICAL ONCE
OUTPATIENT
Start: 2022-11-21 | End: 2022-11-21

## 2022-11-21 RX ORDER — BACITRACIN, NEOMYCIN, POLYMYXIN B 400; 3.5; 5 [USP'U]/G; MG/G; [USP'U]/G
OINTMENT TOPICAL ONCE
OUTPATIENT
Start: 2022-11-21 | End: 2022-11-21

## 2022-11-21 RX ORDER — LIDOCAINE 40 MG/G
CREAM TOPICAL ONCE
Status: CANCELLED | OUTPATIENT
Start: 2022-11-21 | End: 2022-11-21

## 2022-11-21 RX ORDER — BETAMETHASONE DIPROPIONATE 0.05 %
OINTMENT (GRAM) TOPICAL ONCE
Status: CANCELLED | OUTPATIENT
Start: 2022-11-21 | End: 2022-11-21

## 2022-11-21 RX ORDER — LIDOCAINE 40 MG/G
CREAM TOPICAL ONCE
OUTPATIENT
Start: 2022-11-21 | End: 2022-11-21

## 2022-11-21 RX ORDER — BETAMETHASONE DIPROPIONATE 0.05 %
OINTMENT (GRAM) TOPICAL ONCE
OUTPATIENT
Start: 2022-11-21 | End: 2022-11-21

## 2022-11-21 RX ORDER — BACITRACIN, NEOMYCIN, POLYMYXIN B 400; 3.5; 5 [USP'U]/G; MG/G; [USP'U]/G
OINTMENT TOPICAL ONCE
Status: CANCELLED | OUTPATIENT
Start: 2022-11-21 | End: 2022-11-21

## 2022-11-21 RX ORDER — GENTAMICIN SULFATE 1 MG/G
OINTMENT TOPICAL ONCE
OUTPATIENT
Start: 2022-11-21 | End: 2022-11-21

## 2022-11-21 RX ORDER — BACITRACIN ZINC AND POLYMYXIN B SULFATE 500; 1000 [USP'U]/G; [USP'U]/G
OINTMENT TOPICAL ONCE
Status: CANCELLED | OUTPATIENT
Start: 2022-11-21 | End: 2022-11-21

## 2022-11-21 RX ORDER — GENTAMICIN SULFATE 1 MG/G
OINTMENT TOPICAL ONCE
Status: CANCELLED | OUTPATIENT
Start: 2022-11-21 | End: 2022-11-21

## 2022-11-21 RX ORDER — LIDOCAINE HYDROCHLORIDE 40 MG/ML
SOLUTION TOPICAL ONCE
Status: CANCELLED | OUTPATIENT
Start: 2022-11-21 | End: 2022-11-21

## 2022-11-21 RX ORDER — LIDOCAINE HYDROCHLORIDE 40 MG/ML
SOLUTION TOPICAL ONCE
OUTPATIENT
Start: 2022-11-21 | End: 2022-11-21

## 2022-11-21 RX ORDER — LIDOCAINE 50 MG/G
OINTMENT TOPICAL ONCE
OUTPATIENT
Start: 2022-11-21 | End: 2022-11-21

## 2022-11-21 RX ADMIN — LIDOCAINE HYDROCHLORIDE 6 ML: 20 JELLY TOPICAL at 10:23

## 2022-11-21 ASSESSMENT — PAIN DESCRIPTION - LOCATION: LOCATION: LEG

## 2022-11-21 ASSESSMENT — PAIN DESCRIPTION - ORIENTATION: ORIENTATION: LEFT

## 2022-11-21 ASSESSMENT — PAIN DESCRIPTION - DESCRIPTORS: DESCRIPTORS: NUMBNESS

## 2022-11-21 ASSESSMENT — PAIN SCALES - GENERAL: PAINLEVEL_OUTOF10: 3

## 2022-11-21 NOTE — PROGRESS NOTES
[address] [facility], [facility]  Phone: [phone]   Fax: [phone]     Ordering Center:     [facility] [address] [address]  [phone]  WOUND CARE Dept: [address] [place] [address] NUMBER [phone]    Patient Information:      Tyronence Josh [address]   [phone]   : 1935  AGE: 82 y.o. GENDER: female   TODAYS DATE:  2022    Insurance:      PRIMARY INSURANCE:  Plan: [place] [facility] [place] ONLY  Coverage: [facility] MEDICARE  Effective Date: 2019  [id number] - (Medicare Managed)    Patient Wound Information:      Problem List Items Addressed This Visit          Other    Open wound of left lower leg     Open wound of left lower leg, initial encounter  S81.802A       WOUNDS REQUIRING DRESSING SUPPLIES:     Wound 22 Leg Left; Lower; Posterior (Active)   Wound Image   22 0848   Wound Etiology Traumatic 22 0848   Wound Length (cm) 1.7 cm 22 0848   Wound Width (cm) 1.4 cm 22 0848   Wound Depth (cm) 0.1 cm 22 0848   Wound Surface Area (cm^2) 2.38 cm^2 22 0848   Wound Volume (cm^3) 0.238 cm^3 22 0848   Post-Procedure Length (cm) 1.7 cm 22 0848   Post-Procedure Width (cm) 1.4 cm 22 0848   Post-Procedure Depth (cm) 0.1 cm 22 0848   Post-Procedure Surface Area (cm^2) 2.38 cm^2 22 0848   Post-Procedure Volume (cm^3) 0.238 cm^3 22 0848   Wound Assessment Pink/red;Slough 22 0848   Drainage Amount Moderate 22 0848   Drainage Description Yellow 22 0848   Odor None 22 0848   Kate-wound Assessment Blanchable erythema 22 0848   Margins Defined edges 22 0848   Wound Thickness Description not for Pressure Injury Full thickness 22 0848   Number of days: 0          Supplies Requested :      WOUND #: 1   PRIMARY DRESSING:  Collagen with silver (prefer maris)   Cover and Secure with:  Other Excel SAP     FREQUENCY OF DRESSING CHANGES:  Daily       ADDITIONAL ITEMS:  [] Gloves Small  [x] Gloves Medium [] Gloves Large [] Gloves Teresa Grounds  [] Tape 1\" [x] Tape 2\" [] Tape 3\"  [] Medipore Tape  [x] Saline  [] Skin Prep   [] Adhesive Remover   [] Cotton Tip Applicators   [] Other:    Patient Wound(s) Debrided: [x] Yes   [] No    Debribement Type: subcutaneous tissue    Debridement Date: 11/21/22    Patient currently being seen by Home Health: [] Yes   [x] No    Duration for needed supplies:  []15  [x]30  []60  []90 Days    Provider Information:      PROVIDER'S Hallie Orozco  UBAILEYO-[id number]

## 2022-11-21 NOTE — PROGRESS NOTES
[address]       Progress Note and Procedure Note      History and Physical     Chief Complaint   Patient presents with    Wound Check     Left leg        HPI:   Marion Fuchs is a 82 y.o. female who presents for wound ulcer evaluation. Patient had the wounds started in July after her leash got wrapped around her legs. It caused her sore. Patient states that she has had these type of sores in the past.. History of wound context:  Patient is here for evaluation of the ulcer. It has been present since July. Wound/ulcer pain timing/severity: There is minimal pain associated with it  Quality of pain: Dull  Severity: 2 out of 10  Modifying factor:  Worse with debridement  Associated signs and symptoms: Edema    Ulcer identification:  Ulcer type: Traumatic  Contributing factor: Edema      Medications:     Current Outpatient Medications   Medication Sig Dispense Refill    atorvastatin (LIPITOR) 80 MG tablet TAKE ONE TABLET BY MOUTH DAILY 30 tablet 5    furosemide (LASIX) 40 MG tablet TAKE ONE TABLET BY MOUTH TWICE A DAY 60 tablet 5    omeprazole (PRILOSEC) 20 MG delayed release capsule TAKE ONE CAPSULE BY MOUTH EVERY MORNING BEFORE BREAKFAST 90 capsule 1    losartan (COZAAR) 50 MG tablet TAKE ONE TABLET BY MOUTH DAILY 90 tablet 2    potassium chloride (KLOR-CON M) 10 MEQ extended release tablet TAKE TWO TABLETS BY MOUTH DAILY 60 tablet 2    lidocaine 1 % injection 8 mLs      potassium chloride (KLOR-CON) 20 MEQ packet potassium chloride 20 mEq oral packet      triamcinolone acetonide (KENALOG) 10 MG/ML injection 6 mLs      nitroGLYCERIN (NITROSTAT) 0.4 MG SL tablet Place 1 tablet under the tongue every 5 minutes as needed for Chest pain 25 tablet 5    latanoprost (XALATAN) 0.005 % ophthalmic solution Place 1 drop into both eyes daily 1 Bottle 3    aspirin 81 MG tablet Take 81 mg by mouth      brimonidine (ALPHAGAN) 0.2 % ophthalmic solution Apply 1 drop to eye      carvedilol (COREG) 3.125 MG tablet Take 3.125 mg by mouth      ibandronate (BONIVA) 150 MG tablet Take 1 tablet by mouth every 30 days 30 tablet 5     No current facility-administered medications for this encounter. Allergies:   No Known Allergies  Review of Systems:   Constitutional: Negative for fever, chills, fatigue and unexpected weight change. HENT: Negative for hearing loss, sore throat and facial swelling. Eyes: Negative for pain and discharge. Respiratory: Negative for cough and shortness of breath. Cardiovascular: Patient with a history of DVT, MI  Gastrointestinal: Patient with a history of diverticulitis, bowel obstruction  Skin: Negative for pallor and rash. Neurological: Negative for seizures, syncope and numbness. Hematological: Does not bruise/bleed easily. Psychiatric/Behavioral: Negative for behavioral problems. The patient is not nervous/anxious. Past Medical History:   Diagnosis Date    Bowel obstruction ([facility]     Broken hip ([facility]     Diverticulitis     DVT (deep venous thrombosis) (HCC)     Heart attack (HCC)     MRSA (methicillin resistant staph aureus) culture positive      Past Surgical History:   Procedure Laterality Date    TOTAL HIP ARTHROPLASTY       Social History     Socioeconomic History    Marital status:      Spouse name: Not on file    Number of children: Not on file    Years of education: Not on file    Highest education level: Not on file   Occupational History    Not on file   Tobacco Use    Smoking status: Never    Smokeless tobacco: Never   Vaping Use    Vaping Use: Not on file   Substance and Sexual Activity    Alcohol use:  Yes     Alcohol/week: 3.0 standard drinks     Types: 3 Glasses of wine per week    Drug use: Never    Sexual activity: Not on file   Other Topics Concern    Not on file   Social History Narrative    Not on file     Social Determinants of Health     Financial Resource Strain: Low Risk     Difficulty of Paying Living Expenses: Not hard at all   Food Insecurity: No Food Insecurity    Worried About Running Out of Food in the Last Year: Never true    Ran Out of Food in the Last Year: Never true   Transportation Needs: Not on file   Physical Activity: Unknown    Days of Exercise per Week: 0 days    Minutes of Exercise per Session: Not on file   Stress: Not on file   Social Connections: Not on file   Intimate Partner Violence: Not on file   Housing Stability: Not on file     No family history on file. Physical Exam:   /73   Temp 97.1 °F (36.2 °C) (Tympanic)   Resp 18   Ht 5' (1.524 m)   Wt 138 lb (62.6 kg)   BMI 26.95 kg/m²    Body mass index is 26.95 kg/m². Physical Exam   Nursing note and vitals reviewed. Constitutional: Oriented to person, place, and time. Appears well-developed and well-nourished. No distress. Head: Normocephalic and atraumatic. Eyes: Conjunctivae and EOM are normal.   Pulmonary/Chest: Effort normal. No respiratory distress. Neurological: Alert and oriented to person, place, and time. Psychiatric: Normal mood and affect. Behavior is normal    Post Debridement Measurements:  Wound/Ulcer Descriptions are Pre Debridement except measurements:    Wound 11/21/22 Leg Left; Lower; Posterior (Active)   Wound Image   11/21/22 0848   Wound Etiology Traumatic 11/21/22 0848   Wound Length (cm) 1.7 cm 11/21/22 0848   Wound Width (cm) 1.4 cm 11/21/22 0848   Wound Depth (cm) 0.1 cm 11/21/22 0848   Wound Surface Area (cm^2) 2.38 cm^2 11/21/22 0848   Wound Volume (cm^3) 0.238 cm^3 11/21/22 0848   Wound Assessment Pink/red;Slough 11/21/22 0848   Drainage Amount Moderate 11/21/22 0848   Drainage Description Yellow 11/21/22 0848   Odor None 11/21/22 0848   Kate-wound Assessment Blanchable erythema 11/21/22 0848   Margins Defined edges 11/21/22 0848   Wound Thickness Description not for Pressure Injury Full thickness 11/21/22 0848   Number of days: 0          Procedure Note  Indications:  Based on my examination of this patient's wound(s)/ulcer(s) today, debridement is required to promote healing and evaluate the wound base. Performed by: Jesse Huitron MD    Consent obtained:  Yes    Time out taken:  Yes    Pain Control: Anesthetic  Anesthetic: 2% Lidocaine Gel Topical       Percent of Wound(s)/Ulcer(s) Debrided: 100%    Total Surface Area Debrided: Excisional debridement left lower extremity through the subcutaneous tissue measuring 2.38 sq cm       Diabetic/Pressure/Non Pressure Ulcers only:  Ulcer: Non-Pressure ulcer, fat layer exposed      Estimated Blood Loss:  Minimal    Hemostasis Achieved:  by pressure    Procedural Pain:  0  / 10     Post Procedural Pain:  0 / 10     Response to treatment:  Well tolerated by patient. Imaging:   [unfilled]        Impression/Plan:     Problem List Items Addressed This Visit    None    There is no problem list on file for this patient. Plan:  Wound care as per discharge instructions.        Electronically signed by:  Jesse Huitron MD 11/21/2022

## 2022-11-21 NOTE — PLAN OF CARE
Problem: Discharge Planning  Goal: Discharge to home or other facility with appropriate resources  Outcome: Progressing     Problem: Wound:  Goal: Will show signs of wound healing; wound closure and no evidence of infection  Description: Will show signs of wound healing; wound closure and no evidence of infection  Outcome: Progressing

## 2022-11-26 LAB
CULTURE: ABNORMAL
CULTURE: ABNORMAL
DIRECT EXAM: ABNORMAL
DIRECT EXAM: ABNORMAL
SPECIMEN DESCRIPTION: ABNORMAL

## 2022-11-28 ENCOUNTER — HOSPITAL ENCOUNTER (OUTPATIENT)
Dept: WOUND CARE | Age: 87
Discharge: HOME | End: 2022-11-28
Payer: MEDICARE

## 2022-11-28 VITALS
RESPIRATION RATE: 16 BRPM | SYSTOLIC BLOOD PRESSURE: 120 MMHG | BODY MASS INDEX: 27.09 KG/M2 | HEART RATE: 77 BPM | DIASTOLIC BLOOD PRESSURE: 88 MMHG | HEIGHT: 60 IN | TEMPERATURE: 96.3 F | WEIGHT: 138 LBS

## 2022-11-28 DIAGNOSIS — S81.802A OPEN WOUND OF LEFT LOWER LEG, INITIAL ENCOUNTER: Primary | ICD-10-CM

## 2022-11-28 PROCEDURE — 11042 DBRDMT SUBQ TIS 1ST 20SQCM/<: CPT

## 2022-11-28 PROCEDURE — 11042 DBRDMT SUBQ TIS 1ST 20SQCM/<: CPT | Performed by: PLASTIC SURGERY

## 2022-11-28 RX ORDER — LIDOCAINE HYDROCHLORIDE 20 MG/ML
JELLY TOPICAL ONCE
OUTPATIENT
Start: 2022-11-28 | End: 2022-11-28

## 2022-11-28 RX ORDER — LIDOCAINE HYDROCHLORIDE 40 MG/ML
SOLUTION TOPICAL ONCE
OUTPATIENT
Start: 2022-11-28 | End: 2022-11-28

## 2022-11-28 RX ORDER — GINSENG 100 MG
CAPSULE ORAL ONCE
OUTPATIENT
Start: 2022-11-28 | End: 2022-11-28

## 2022-11-28 RX ORDER — BACITRACIN ZINC AND POLYMYXIN B SULFATE 500; 1000 [USP'U]/G; [USP'U]/G
OINTMENT TOPICAL ONCE
OUTPATIENT
Start: 2022-11-28 | End: 2022-11-28

## 2022-11-28 RX ORDER — MAGNESIUM GLUCONATE 27 MG(500)
250 TABLET ORAL DAILY
COMMUNITY

## 2022-11-28 RX ORDER — CLOBETASOL PROPIONATE 0.5 MG/G
OINTMENT TOPICAL ONCE
OUTPATIENT
Start: 2022-11-28 | End: 2022-11-28

## 2022-11-28 RX ORDER — GENTAMICIN SULFATE 1 MG/G
OINTMENT TOPICAL ONCE
OUTPATIENT
Start: 2022-11-28 | End: 2022-11-28

## 2022-11-28 RX ORDER — LIDOCAINE 40 MG/G
CREAM TOPICAL ONCE
OUTPATIENT
Start: 2022-11-28 | End: 2022-11-28

## 2022-11-28 RX ORDER — M-VIT,TX,IRON,MINS/CALC/FOLIC 27MG-0.4MG
1 TABLET ORAL DAILY
COMMUNITY

## 2022-11-28 RX ORDER — BACITRACIN, NEOMYCIN, POLYMYXIN B 400; 3.5; 5 [USP'U]/G; MG/G; [USP'U]/G
OINTMENT TOPICAL ONCE
OUTPATIENT
Start: 2022-11-28 | End: 2022-11-28

## 2022-11-28 RX ORDER — LIDOCAINE 50 MG/G
OINTMENT TOPICAL ONCE
OUTPATIENT
Start: 2022-11-28 | End: 2022-11-28

## 2022-11-28 RX ORDER — BETAMETHASONE DIPROPIONATE 0.05 %
OINTMENT (GRAM) TOPICAL ONCE
OUTPATIENT
Start: 2022-11-28 | End: 2022-11-28

## 2022-11-28 RX ORDER — LIDOCAINE HYDROCHLORIDE 20 MG/ML
JELLY TOPICAL ONCE
Status: COMPLETED | OUTPATIENT
Start: 2022-11-28 | End: 2022-11-28

## 2022-11-28 RX ADMIN — LIDOCAINE HYDROCHLORIDE 6 ML: 20 JELLY TOPICAL at 09:31

## 2022-11-28 NOTE — DISCHARGE INSTRUCTIONS
[address] [facility] -Phone: [phone] Fax: [phone]    Visit  Discharge Instructions / Physician Orders     DATE: 11/28/2022     Home Care: N/A     SUPPLIES ORDERED THRU: [employer] (Ordered 11/21/22)     Wound Location: Left Lower posterior Leg      Cleanse with: Liquid antibacterial soap and water, rinse well      Dressing Orders: Normal-saline moistened Yecenia, Gentac, Compression sock     Frequency: DAILY     Additional Orders: Increase protein to diet (meat, cheese, eggs, fish, peanut butter, nuts and beans)  Multivitamin daily  ELEVATE LEGS AS MUCH AS POSSIBLE  11/21/22-Culture taken     [facility]     (Please note your next appointment above and if you are unable to keep, kindly give a 24 hour notice. Thank you.)  If more than 15 min late we cannot guarantee you will be seen due to clinician schedule  Per Policy, Excessive cancellation will call for dismissal from program.     If you experience any of the following, please call the [employer] [employer] during business hours:  [phone]  Your Phone call may be forwarded to [employer] during business hours that [employer] is closed. * Increase in Pain  * Temperature over 101  * Increase in drainage from your wound  * Drainage with a foul odor  * Bleeding  * Increase in swelling  * Need for compression bandage changes due to slippage, breakthrough drainage. If you need medical attention outside of the business hours of the [facility] please contact your PCP or go to the nearest emergency room. The information contained in the After Visit Summary has been reviewed with me, the patient and/or responsible adult, by my health care provider(s). I had the opportunity to ask questions regarding this information.  I have elected to receive;      []After Visit Summary  [x]Comprehensive Discharge Instruction        Patient signature______________________________________Date:________  Electronically signed by Nora Torrez RN on 11/28/2022 at 9:37 AM   Electronically signed by Jaydon Grijalva MD on 11/28/2022 at 9:33 AM

## 2022-11-28 NOTE — PROGRESS NOTES
[address]       Progress Note and Procedure Note      Progress note     Chief Complaint   Patient presents with    Wound Check     Left leg        HPI:   John Han is a 82 y.o. female who presents for wound ulcer evaluation. Patient had the wounds started in July after her leash got wrapped around her legs. It caused her sore. Patient states that she has had these type of sores in the past.. History of wound context:  Patient is here for evaluation of the ulcer. It has been present since July. Wound/ulcer pain timing/severity: There is minimal pain associated with it  Quality of pain: Dull  Severity: 2 out of 10  Modifying factor:  Worse with debridement  Associated signs and symptoms: Edema    Ulcer identification:  Ulcer type: Traumatic  Contributing factor: Edema      Medications:     Current Outpatient Medications   Medication Sig Dispense Refill    Multiple Vitamins-Minerals (THERAPEUTIC MULTIVITAMIN-MINERALS) tablet Take 1 tablet by mouth daily      magnesium gluconate (MAGONATE) 500 MG tablet Take 250 mg by mouth daily      atorvastatin (LIPITOR) 80 MG tablet TAKE ONE TABLET BY MOUTH DAILY 30 tablet 5    furosemide (LASIX) 40 MG tablet TAKE ONE TABLET BY MOUTH TWICE A DAY 60 tablet 5    omeprazole (PRILOSEC) 20 MG delayed release capsule TAKE ONE CAPSULE BY MOUTH EVERY MORNING BEFORE BREAKFAST 90 capsule 1    losartan (COZAAR) 50 MG tablet TAKE ONE TABLET BY MOUTH DAILY 90 tablet 2    potassium chloride (KLOR-CON M) 10 MEQ extended release tablet TAKE TWO TABLETS BY MOUTH DAILY 60 tablet 2    lidocaine 1 % injection 8 mLs      potassium chloride (KLOR-CON) 20 MEQ packet potassium chloride 20 mEq oral packet      triamcinolone acetonide (KENALOG) 10 MG/ML injection 6 mLs      nitroGLYCERIN (NITROSTAT) 0.4 MG SL tablet Place 1 tablet under the tongue every 5 minutes as needed for Chest pain 25 tablet 5    latanoprost (XALATAN) 0.005 % ophthalmic solution Place 1 drop into both eyes daily 1 Bottle 3    aspirin 81 MG tablet Take 81 mg by mouth      brimonidine (ALPHAGAN) 0.2 % ophthalmic solution Apply 1 drop to eye      carvedilol (COREG) 3.125 MG tablet Take 3.125 mg by mouth      ibandronate (BONIVA) 150 MG tablet Take 1 tablet by mouth every 30 days 30 tablet 5     No current facility-administered medications for this encounter. Allergies:   No Known Allergies  Review of Systems:   Constitutional: Negative for fever, chills, fatigue and unexpected weight change. HENT: Negative for hearing loss, sore throat and facial swelling. Eyes: Negative for pain and discharge. Respiratory: Negative for cough and shortness of breath. Cardiovascular: Patient with a history of DVT, MI  Gastrointestinal: Patient with a history of diverticulitis, bowel obstruction  Skin: Negative for pallor and rash. Neurological: Negative for seizures, syncope and numbness. Hematological: Does not bruise/bleed easily. Psychiatric/Behavioral: Negative for behavioral problems. The patient is not nervous/anxious. Past Medical History:   Diagnosis Date    Bowel obstruction ([address]     Broken hip ([facility] [address]     Diverticulitis     DVT (deep venous thrombosis) (HCC)     Heart attack (HCC)     MRSA (methicillin resistant staph aureus) culture positive      Past Surgical History:   Procedure Laterality Date    TOTAL HIP ARTHROPLASTY       Social History     Socioeconomic History    Marital status:      Spouse name: Not on file    Number of children: Not on file    Years of education: Not on file    Highest education level: Not on file   Occupational History    Not on file   Tobacco Use    Smoking status: Never    Smokeless tobacco: Never   Vaping Use    Vaping Use: Not on file   Substance and Sexual Activity    Alcohol use:  Yes     Alcohol/week: 3.0 standard drinks     Types: 3 Glasses of wine per week    Drug use: Never    Sexual activity: Not on file   Other Topics Concern    Not on file   Social History Narrative    Not on file     Social Determinants of Health     Financial Resource Strain: Low Risk     Difficulty of Paying Living Expenses: Not hard at all   Food Insecurity: No Food Insecurity    Worried About Running Out of Food in the Last Year: Never true    Ran Out of Food in the Last Year: Never true   Transportation Needs: Not on file   Physical Activity: Unknown    Days of Exercise per Week: 0 days    Minutes of Exercise per Session: Not on file   Stress: Not on file   Social Connections: Not on file   Intimate Partner Violence: Not on file   Housing Stability: Not on file     History reviewed. No pertinent family history. Physical Exam:   /88   Pulse 77   Temp (!) 96.3 °F (35.7 °C) (Tympanic)   Resp 16   Ht 5' (1.524 m)   Wt 138 lb (62.6 kg)   BMI 26.95 kg/m²    Body mass index is 26.95 kg/m². Physical Exam   Nursing note and vitals reviewed. Constitutional: Oriented to person, place, and time. Appears well-developed and well-nourished. No distress. Head: Normocephalic and atraumatic. Eyes: Conjunctivae and EOM are normal.   Pulmonary/Chest: Effort normal. No respiratory distress. Neurological: Alert and oriented to person, place, and time. Psychiatric: Normal mood and affect. Behavior is normal    Post Debridement Measurements:  Wound/Ulcer Descriptions are Pre Debridement except measurements:    Wound 11/21/22 Leg Left; Lower; Posterior #1 (Active)   Wound Image   11/21/22 0848   Wound Etiology Traumatic 11/28/22 0922   Wound Length (cm) 1.6 cm 11/28/22 0922   Wound Width (cm) 1.4 cm 11/28/22 0922   Wound Depth (cm) 0.1 cm 11/28/22 0922   Wound Surface Area (cm^2) 2.24 cm^2 11/28/22 0922   Change in Wound Size % (l*w) 5.88 11/28/22 0922   Wound Volume (cm^3) 0.224 cm^3 11/28/22 0922   Wound Healing % 6 11/28/22 0922   Post-Procedure Length (cm) 1.6 cm 11/28/22 0922   Post-Procedure Width (cm) 1.4 cm 11/28/22 0748   Post-Procedure Depth (cm) 0.1 cm 11/28/22 1208   Post-Procedure Surface Area (cm^2) 2.24 cm^2 11/28/22 0922   Post-Procedure Volume (cm^3) 0.224 cm^3 11/28/22 0922   Wound Assessment Pink/red;Slough 11/28/22 0922   Drainage Amount Moderate 11/28/22 0922   Drainage Description Yellow 11/28/22 0922   Odor None 11/28/22 0922   Kate-wound Assessment Blanchable erythema 11/28/22 0922   Margins Defined edges 11/28/22 0922   Wound Thickness Description not for Pressure Injury Full thickness 11/28/22 5322   Number of days: 7          Procedure Note  Indications:  Based on my examination of this patient's wound(s)/ulcer(s) today, debridement is required to promote healing and evaluate the wound base. Performed by: Alejandrina Garcia MD    Consent obtained:  Yes    Time out taken:  Yes    Pain Control: Anesthetic  Anesthetic: 2% Lidocaine Gel Topical       Percent of Wound(s)/Ulcer(s) Debrided: 100%    Total Surface Area Debrided: Excisional debridement left lower extremity through the subcutaneous tissue measuring 2.24 sq cm       Diabetic/Pressure/Non Pressure Ulcers only:  Ulcer: Non-Pressure ulcer, fat layer exposed      Estimated Blood Loss:  Minimal    Hemostasis Achieved:  by pressure    Procedural Pain:  0  / 10     Post Procedural Pain:  0 / 10     Response to treatment:  Well tolerated by patient. Imaging:   [unfilled]        Impression/Plan:     Problem List Items Addressed This Visit          Other    Open wound of left lower leg - Primary    Relevant Orders    Initiate Outpatient Wound Care Protocol       Patient Active Problem List   Diagnosis    Open wound of left lower leg         Plan:  Wound care as per discharge instructions. The culture results were discussed with the patient.        Electronically signed by:  Alejandrina Garcia MD 11/28/2022

## 2022-12-06 NOTE — DISCHARGE INSTRUCTIONS
[address] [facility],[address] -Phone: [phone] Fax: [phone]    Visit  Discharge Instructions / Physician Orders     DATE: 12/12/2022     Home Care: N/A     SUPPLIES ORDERED THRU: [facility] (Ordered 11/21/22)     Wound Location: Left Lower posterior Leg      Cleanse with: Liquid antibacterial soap and water, rinse well      Dressing Orders: Normal-saline moistened Yecenia, Gentac, Compression sock     Frequency: DAILY     Additional Orders: Increase protein to diet (meat, cheese, eggs, fish, peanut butter, nuts and beans)  Multivitamin daily  ELEVATE LEGS AS MUCH AS POSSIBLE  11/21/22-Culture taken     Your next appointment with [address] [place] [address] is in 2 weeks     (Please note your next appointment above and if you are unable to keep, kindly give a 24 hour notice. Thank you.)  If more than 15 min late we cannot guarantee you will be seen due to clinician schedule  Per Policy, Excessive cancellation will call for dismissal from program.     If you experience any of the following, please call the [address] [place] [address] during business hours:  [phone]  Your Phone call may be forwarded to [email or web address] during business hours that [facility] is closed. * Increase in Pain  * Temperature over 101  * Increase in drainage from your wound  * Drainage with a foul odor  * Bleeding  * Increase in swelling  * Need for compression bandage changes due to slippage, breakthrough drainage. If you need medical attention outside of the business hours of the [address] [place] [address] please contact your PCP or go to the nearest emergency room. The information contained in the After Visit Summary has been reviewed with me, the patient and/or responsible adult, by my health care provider(s). I had the opportunity to ask questions regarding this information.  I have elected to receive;      []After Visit Summary  [x]Comprehensive Discharge Instruction        Patient signature______________________________________Date:________

## 2022-12-09 ENCOUNTER — TELEPHONE (OUTPATIENT)
Dept: FAMILY MEDICINE CLINIC | Age: 87
End: 2022-12-09

## 2022-12-09 NOTE — TELEPHONE ENCOUNTER
Patient called in stating that she has had a bad cough since yesterday and would like to know if we could call in a cough medication for this or what we recommend.

## 2022-12-12 ENCOUNTER — HOSPITAL ENCOUNTER (OUTPATIENT)
Dept: WOUND CARE | Age: 87
Discharge: HOME | End: 2022-12-12

## 2022-12-12 ENCOUNTER — TELEPHONE (OUTPATIENT)
Dept: WOUND CARE | Age: 87
End: 2022-12-12

## 2022-12-12 NOTE — TELEPHONE ENCOUNTER
Patient called and left voice message to cancel [facility] wound care appointment because she is not feeling well, patient will call back to reschedule appointment.

## 2022-12-13 DIAGNOSIS — Z76.0 MEDICATION REFILL: ICD-10-CM

## 2022-12-13 RX ORDER — POTASSIUM CHLORIDE 750 MG/1
TABLET, EXTENDED RELEASE ORAL
Qty: 60 TABLET | Refills: 5 | Status: SHIPPED | OUTPATIENT
Start: 2022-12-13

## 2022-12-16 NOTE — DISCHARGE INSTRUCTIONS
[address] [facility],[address] -Phone: [phone] Fax: [phone]    Visit  Discharge Instructions / Physician Orders     DATE: 12/20/2022     Home Care: N/A     SUPPLIES ORDERED THRU: [facility]      Wound Location: Left Lower posterior Leg      Cleanse with: Liquid antibacterial soap and water, rinse well      Dressing Orders: Normal-saline moistened Yecenia, Gentac, Compression sock     Frequency: DAILY     Additional Orders: Increase protein to diet (meat, cheese, eggs, fish, peanut butter, nuts and beans)  Multivitamin daily  ELEVATE LEGS AS MUCH AS POSSIBLE  11/21/22-Culture taken     Your next appointment with [employer] is in 2 weeks     (Please note your next appointment above and if you are unable to keep, kindly give a 24 hour notice. Thank you.)  If more than 15 min late we cannot guarantee you will be seen due to clinician schedule  Per Policy, Excessive cancellation will call for dismissal from program.     If you experience any of the following, please call the [facility] during business hours:  [phone]  Your Phone call may be forwarded to [employer] during business hours that [facility] is closed. * Increase in Pain  * Temperature over 101  * Increase in drainage from your wound  * Drainage with a foul odor  * Bleeding  * Increase in swelling  * Need for compression bandage changes due to slippage, breakthrough drainage. If you need medical attention outside of the business hours of the [facility] please contact your PCP or go to the nearest emergency room. The information contained in the After Visit Summary has been reviewed with me, the patient and/or responsible adult, by my health care provider(s). I had the opportunity to ask questions regarding this information.  I have elected to receive;      []After Visit Summary  [x]Comprehensive Discharge Instruction        Patient signature______________________________________Date:________  Electronically signed by Nunu Camarena RN on 12/20/2022 at 10:49 AM   Electronically signed by Morrison Schwab, APRN - CNP on 12/20/2022 at 10:52 AM

## 2022-12-20 ENCOUNTER — HOSPITAL ENCOUNTER (OUTPATIENT)
Dept: WOUND CARE | Age: 87
Discharge: HOME | End: 2022-12-20
Payer: MEDICARE

## 2022-12-20 VITALS — HEART RATE: 77 BPM | SYSTOLIC BLOOD PRESSURE: 143 MMHG | TEMPERATURE: 96.3 F | DIASTOLIC BLOOD PRESSURE: 68 MMHG

## 2022-12-20 DIAGNOSIS — S81.802A OPEN WOUND OF LEFT LOWER LEG, INITIAL ENCOUNTER: Primary | ICD-10-CM

## 2022-12-20 PROBLEM — M17.12 UNILATERAL PRIMARY OSTEOARTHRITIS, LEFT KNEE: Status: ACTIVE | Noted: 2022-09-28

## 2022-12-20 PROCEDURE — 11042 DBRDMT SUBQ TIS 1ST 20SQCM/<: CPT

## 2022-12-20 RX ORDER — GINSENG 100 MG
CAPSULE ORAL ONCE
Status: CANCELLED | OUTPATIENT
Start: 2022-12-20 | End: 2022-12-20

## 2022-12-20 RX ORDER — LIDOCAINE HYDROCHLORIDE 20 MG/ML
JELLY TOPICAL ONCE
OUTPATIENT
Start: 2022-12-20 | End: 2022-12-20

## 2022-12-20 RX ORDER — LIDOCAINE HYDROCHLORIDE 20 MG/ML
JELLY TOPICAL ONCE
Status: COMPLETED | OUTPATIENT
Start: 2022-12-20 | End: 2022-12-20

## 2022-12-20 RX ORDER — BETAMETHASONE DIPROPIONATE 0.05 %
OINTMENT (GRAM) TOPICAL ONCE
Status: CANCELLED | OUTPATIENT
Start: 2022-12-20 | End: 2022-12-20

## 2022-12-20 RX ORDER — LIDOCAINE HYDROCHLORIDE 40 MG/ML
SOLUTION TOPICAL ONCE
OUTPATIENT
Start: 2022-12-20 | End: 2022-12-20

## 2022-12-20 RX ORDER — BACITRACIN, NEOMYCIN, POLYMYXIN B 400; 3.5; 5 [USP'U]/G; MG/G; [USP'U]/G
OINTMENT TOPICAL ONCE
Status: CANCELLED | OUTPATIENT
Start: 2022-12-20 | End: 2022-12-20

## 2022-12-20 RX ORDER — BACITRACIN ZINC AND POLYMYXIN B SULFATE 500; 1000 [USP'U]/G; [USP'U]/G
OINTMENT TOPICAL ONCE
Status: CANCELLED | OUTPATIENT
Start: 2022-12-20 | End: 2022-12-20

## 2022-12-20 RX ORDER — GENTAMICIN SULFATE 1 MG/G
OINTMENT TOPICAL ONCE
Status: CANCELLED | OUTPATIENT
Start: 2022-12-20 | End: 2022-12-20

## 2022-12-20 RX ORDER — CLOBETASOL PROPIONATE 0.5 MG/G
OINTMENT TOPICAL ONCE
Status: CANCELLED | OUTPATIENT
Start: 2022-12-20 | End: 2022-12-20

## 2022-12-20 RX ORDER — LIDOCAINE 50 MG/G
OINTMENT TOPICAL ONCE
Status: CANCELLED | OUTPATIENT
Start: 2022-12-20 | End: 2022-12-20

## 2022-12-20 RX ORDER — LIDOCAINE 40 MG/G
CREAM TOPICAL ONCE
Status: CANCELLED | OUTPATIENT
Start: 2022-12-20 | End: 2022-12-20

## 2022-12-20 RX ADMIN — LIDOCAINE HYDROCHLORIDE 6 ML: 20 JELLY TOPICAL at 10:32

## 2022-12-20 ASSESSMENT — ENCOUNTER SYMPTOMS
VOMITING: 0
NAUSEA: 0
RHINORRHEA: 0
SHORTNESS OF BREATH: 0
DIARRHEA: 0
COUGH: 0

## 2022-12-20 NOTE — PROGRESS NOTES
[address]   Progress Note and Procedure Note      Zayda RECORD NUMBER:  [id number]  AGE: 82 y.o. GENDER: female  : 1935  EPISODE DATE:  2022    Subjective:     Chief Complaint   Patient presents with    Wound Check     Lower leg         HISTORY of PRESENT ILLNESS HPI     Syedgagandeep Moreno is a 82 y.o. female who presents today for wound/ulcer evaluation. History of Wound Context: here to follow up on left lower leg wound that is healing well. No signs or symptoms of infection. Wound/Ulcer Pain Timing/Severity: intermittent  Quality of pain: aching  Severity:  2 / 10   Modifying Factors: None  Associated Signs/Symptoms: none    Ulcer Identification:  Ulcer Type: traumatic  Contributing Factors: edema         PAST MEDICAL HISTORY        Diagnosis Date    Bowel obstruction (HCC)     Broken hip (HCC)     Diverticulitis     DVT (deep venous thrombosis) (HCC)     Heart attack (HCC)     MRSA (methicillin resistant staph aureus) culture positive        PAST SURGICAL HISTORY    Past Surgical History:   Procedure Laterality Date    TOTAL HIP ARTHROPLASTY         FAMILY HISTORY    History reviewed. No pertinent family history. SOCIAL HISTORY    Social History     Tobacco Use    Smoking status: Never    Smokeless tobacco: Never   Substance Use Topics    Alcohol use:  Yes     Alcohol/week: 3.0 standard drinks     Types: 3 Glasses of wine per week    Drug use: Never       ALLERGIES    No Known Allergies    MEDICATIONS    Current Outpatient Medications on File Prior to Encounter   Medication Sig Dispense Refill    potassium chloride (KLOR-CON M) 10 MEQ extended release tablet TAKE TWO TABLETS BY MOUTH DAILY 60 tablet 5    Multiple Vitamins-Minerals (THERAPEUTIC MULTIVITAMIN-MINERALS) tablet Take 1 tablet by mouth daily      magnesium gluconate (MAGONATE) 500 MG tablet Take 250 mg by mouth daily      atorvastatin (LIPITOR) 80 MG tablet TAKE ONE TABLET BY MOUTH DAILY 30 tablet 5    furosemide (LASIX) 40 MG tablet TAKE ONE TABLET BY MOUTH TWICE A DAY 60 tablet 5    omeprazole (PRILOSEC) 20 MG delayed release capsule TAKE ONE CAPSULE BY MOUTH EVERY MORNING BEFORE BREAKFAST 90 capsule 1    losartan (COZAAR) 50 MG tablet TAKE ONE TABLET BY MOUTH DAILY 90 tablet 2    lidocaine 1 % injection 8 mLs      potassium chloride (KLOR-CON) 20 MEQ packet potassium chloride 20 mEq oral packet      triamcinolone acetonide (KENALOG) 10 MG/ML injection 6 mLs      nitroGLYCERIN (NITROSTAT) 0.4 MG SL tablet Place 1 tablet under the tongue every 5 minutes as needed for Chest pain 25 tablet 5    latanoprost (XALATAN) 0.005 % ophthalmic solution Place 1 drop into both eyes daily 1 Bottle 3    aspirin 81 MG tablet Take 81 mg by mouth      brimonidine (ALPHAGAN) 0.2 % ophthalmic solution Apply 1 drop to eye      carvedilol (COREG) 3.125 MG tablet Take 3.125 mg by mouth      ibandronate (BONIVA) 150 MG tablet Take 1 tablet by mouth every 30 days 30 tablet 5     No current facility-administered medications on file prior to encounter. REVIEW OF SYSTEMS    Review of Systems   Constitutional:  Negative for chills, fatigue and fever. HENT:  Negative for congestion and rhinorrhea. Respiratory:  Negative for cough and shortness of breath. Cardiovascular:  Negative for chest pain and leg swelling. Gastrointestinal:  Negative for diarrhea, nausea and vomiting. Musculoskeletal:  Negative for gait problem. Skin:  Positive for wound (left lower leg wound). Allergic/Immunologic: Negative for immunocompromised state. Neurological:  Negative for dizziness, syncope and weakness. Psychiatric/Behavioral:  Negative for agitation. The patient is not nervous/anxious.       Objective:      BP (!) 143/68   Pulse 77   Temp (!) 96.3 °F (35.7 °C)     Wt Readings from Last 3 Encounters:   11/28/22 138 lb (62.6 kg)   11/21/22 138 lb (62.6 kg)   08/29/22 142 lb (64.4 kg)       PHYSICAL EXAM    General Appearance: alert and oriented to person, place and time, well-developed and well-nourished, in no acute distress  Skin: warm and dry, no rash or erythema, left lower leg wound   Head: normocephalic and atraumatic  Eyes: pupils equal, round and conjunctivae normal  Pulmonary/Chest: normal air movement, no respiratory distress  Extremities: no cyanosis and no clubbing or edema   Musculoskeletal: no joint swelling, deformity or tenderness  Neurologic: gait, coordination normal and speech normal      Assessment:     Problem List Items Addressed This Visit       Open wound of left lower leg - Primary    Relevant Orders    Initiate Outpatient Wound Care Protocol        Procedure Note  Indications:  Based on my examination of this patient's wound(s)/ulcer(s) today, debridement is required to promote healing and evaluate the wound base. Performed by: FLORIAN Lance CNP    Consent obtained:  Yes    Time out taken:  Yes    Pain Control: Anesthetic  Anesthetic: 2% Lidocaine Gel Topical     Debridement:Excisional Debridement    Using curette the wound(s)/ulcer(s) was/were sharply debrided down through and including the removal of subcutaneous tissue. Devitalized Tissue Debrided:  biofilm and slough    Pre Debridement Measurements:  Are located in the [place]  Documentation Flow Sheet    Wound/Ulcer #: 1    Post Debridement Measurements:  Wound/Ulcer Descriptions are Pre Debridement except measurements:    Wound 11/21/22 Leg Left; Lower; Posterior #1 (Active)   Wound Image   11/21/22 0848   Wound Etiology Traumatic 12/20/22 1029   Dressing Status Old drainage noted;New drainage noted 12/20/22 1029   Wound Cleansed Cleansed with saline 12/20/22 1029   Wound Length (cm) 1.1 cm 12/20/22 1029   Wound Width (cm) 1 cm 12/20/22 1029   Wound Depth (cm) 0.2 cm 12/20/22 1029   Wound Surface Area (cm^2) 1.1 cm^2 12/20/22 1029   Change in Wound Size % (l*w) 53.78 12/20/22 1029   Wound Volume (cm^3) 0.22 cm^3 12/20/22 1029   Wound Healing % 8 12/20/22 1029   Post-Procedure Length (cm) 1.1 cm 12/20/22 1029   Post-Procedure Width (cm) 1 cm 12/20/22 1029   Post-Procedure Depth (cm) 0.2 cm 12/20/22 1029   Post-Procedure Surface Area (cm^2) 1.1 cm^2 12/20/22 1029   Post-Procedure Volume (cm^3) 0.22 cm^3 12/20/22 1029   Wound Assessment Pink/red;Granulation tissue 12/20/22 1029   Drainage Amount Moderate 12/20/22 1029   Drainage Description Serous 12/20/22 1029   Odor None 12/20/22 1029   Kate-wound Assessment Blanchable erythema 12/20/22 1029   Margins Defined edges 12/20/22 1029   Wound Thickness Description not for Pressure Injury Full thickness 12/20/22 1029   Number of days: 29          Percent of Wound(s)/Ulcer(s) Debrided: 100%    Total Surface Area Debrided:  1.10 sq cm     Estimated Blood Loss:  Minimal    Hemostasis Achieved:  by pressure    Procedural Pain:  2  / 10     Post Procedural Pain:  0 / 10     Response to treatment:  Well tolerated by patient. Plan:     Treatment Note please see Discharge Instructions    Written patient dismissal instructions given to patient and signed by patient or POA.            Electronically signed by FLORIAN Redd CNP on 12/20/2022 at 11:02 AM

## 2022-12-28 NOTE — DISCHARGE INSTRUCTIONS
[address] [facility],[address] -Phone: [phone] Fax: [phone]    Visit  Discharge Instructions / Physician Orders     DATE: 1/3/2023     Home Care: N/A     SUPPLIES ORDERED THRU: [facility]      Wound Location: Left Lower posterior Leg      Cleanse with: Liquid antibacterial soap and water, rinse well      Dressing Orders: Normal-saline moistened Yecenia, Gentac, Compression sock     Frequency: DAILY     Additional Orders: Increase protein to diet (meat, cheese, eggs, fish, peanut butter, nuts and beans)  Multivitamin daily  ELEVATE LEGS AS MUCH AS POSSIBLE  11/21/22-Culture taken     Your next appointment with [facility] is in 2 weeks with Dr. Carolynne Dubin     (Please note your next appointment above and if you are unable to keep, kindly give a 24 hour notice. Thank you.)  If more than 15 min late we cannot guarantee you will be seen due to clinician schedule  Per Policy, Excessive cancellation will call for dismissal from program.     If you experience any of the following, please call the [facility] [employer] during business hours:  [phone]  Your Phone call may be forwarded to [employer] during business hours that [facility] is closed. * Increase in Pain  * Temperature over 101  * Increase in drainage from your wound  * Drainage with a foul odor  * Bleeding  * Increase in swelling  * Need for compression bandage changes due to slippage, breakthrough drainage. If you need medical attention outside of the business hours of the [facility] please contact your PCP or go to the nearest emergency room. The information contained in the After Visit Summary has been reviewed with me, the patient and/or responsible adult, by my health care provider(s). I had the opportunity to ask questions regarding this information.  I have elected to receive;      []After Visit Summary  [x]Comprehensive Discharge Instruction        Patient signature______________________________________Date:________  Electronically signed by Abelardo Mccormick RN on 1/3/2023 at 10:32 AM   Electronically signed by FLORIAN Bullard CNP on 1/3/2023 at 10:36 AM

## 2023-01-01 ENCOUNTER — HOSPITAL ENCOUNTER (INPATIENT)
Age: 88
LOS: 2 days | Discharge: HOSPICE | DRG: 291 | End: 2023-10-29
Attending: EMERGENCY MEDICINE | Admitting: STUDENT IN AN ORGANIZED HEALTH CARE EDUCATION/TRAINING PROGRAM
Payer: MEDICARE

## 2023-01-01 VITALS
TEMPERATURE: 97.7 F | OXYGEN SATURATION: 92 % | HEIGHT: 60 IN | HEART RATE: 107 BPM | SYSTOLIC BLOOD PRESSURE: 100 MMHG | DIASTOLIC BLOOD PRESSURE: 62 MMHG | BODY MASS INDEX: 21.05 KG/M2 | WEIGHT: 107.2 LBS | RESPIRATION RATE: 18 BRPM

## 2023-01-01 DIAGNOSIS — R53.1 GENERALIZED WEAKNESS: Primary | ICD-10-CM

## 2023-01-01 LAB
ALBUMIN SERPL-MCNC: 2.2 G/DL (ref 3.5–5.2)
ALP SERPL-CCNC: 189 U/L (ref 35–104)
ALT SERPL-CCNC: 290 U/L (ref 5–33)
ANION GAP SERPL CALCULATED.3IONS-SCNC: 10 MMOL/L (ref 9–17)
ANION GAP SERPL CALCULATED.3IONS-SCNC: 10 MMOL/L (ref 9–17)
AST SERPL-CCNC: 302 U/L
BACTERIA URNS QL MICRO: ABNORMAL
BASOPHILS # BLD: 0.06 K/UL (ref 0–0.2)
BASOPHILS # BLD: 0.08 K/UL (ref 0–0.2)
BASOPHILS NFR BLD: 1 % (ref 0–2)
BASOPHILS NFR BLD: 1 % (ref 0–2)
BILIRUB DIRECT SERPL-MCNC: 0.4 MG/DL
BILIRUB INDIRECT SERPL-MCNC: 0.9 MG/DL (ref 0–1)
BILIRUB SERPL-MCNC: 1.3 MG/DL (ref 0.3–1.2)
BILIRUB UR QL STRIP: NEGATIVE
BUN SERPL-MCNC: 24 MG/DL (ref 8–23)
BUN SERPL-MCNC: 28 MG/DL (ref 8–23)
BUN/CREAT SERPL: 48 (ref 9–20)
BUN/CREAT SERPL: 56 (ref 9–20)
CALCIUM SERPL-MCNC: 8.9 MG/DL (ref 8.6–10.4)
CALCIUM SERPL-MCNC: 8.9 MG/DL (ref 8.6–10.4)
CASTS #/AREA URNS LPF: ABNORMAL /LPF
CASTS #/AREA URNS LPF: ABNORMAL /LPF
CHLORIDE SERPL-SCNC: 96 MMOL/L (ref 98–107)
CHLORIDE SERPL-SCNC: 98 MMOL/L (ref 98–107)
CLARITY UR: CLEAR
CO2 SERPL-SCNC: 26 MMOL/L (ref 20–31)
CO2 SERPL-SCNC: 30 MMOL/L (ref 20–31)
COLOR UR: YELLOW
CREAT SERPL-MCNC: 0.5 MG/DL (ref 0.5–0.9)
CREAT SERPL-MCNC: 0.5 MG/DL (ref 0.5–0.9)
EKG ATRIAL RATE: 119 BPM
EKG Q-T INTERVAL: 342 MS
EKG QRS DURATION: 72 MS
EKG QTC CALCULATION (BAZETT): 454 MS
EKG R AXIS: 65 DEGREES
EKG T AXIS: 63 DEGREES
EKG VENTRICULAR RATE: 106 BPM
EOSINOPHIL # BLD: 0.05 K/UL (ref 0–0.44)
EOSINOPHIL # BLD: 0.08 K/UL (ref 0–0.44)
EOSINOPHILS RELATIVE PERCENT: 1 % (ref 1–4)
EOSINOPHILS RELATIVE PERCENT: 1 % (ref 1–4)
EPI CELLS #/AREA URNS HPF: ABNORMAL /HPF (ref 0–5)
ERYTHROCYTE [DISTWIDTH] IN BLOOD BY AUTOMATED COUNT: 16.7 % (ref 11.8–14.4)
ERYTHROCYTE [DISTWIDTH] IN BLOOD BY AUTOMATED COUNT: 16.9 % (ref 11.8–14.4)
FLUAV RNA RESP QL NAA+PROBE: NOT DETECTED
FLUBV RNA RESP QL NAA+PROBE: NOT DETECTED
GFR SERPL CREATININE-BSD FRML MDRD: >60 ML/MIN/1.73M2
GFR SERPL CREATININE-BSD FRML MDRD: >60 ML/MIN/1.73M2
GLUCOSE SERPL-MCNC: 88 MG/DL (ref 70–99)
GLUCOSE SERPL-MCNC: 90 MG/DL (ref 70–99)
GLUCOSE UR STRIP-MCNC: NEGATIVE MG/DL
HCT VFR BLD AUTO: 39.5 % (ref 36.3–47.1)
HCT VFR BLD AUTO: 40.9 % (ref 36.3–47.1)
HGB BLD-MCNC: 11.9 G/DL (ref 11.9–15.1)
HGB BLD-MCNC: 12.6 G/DL (ref 11.9–15.1)
HGB UR QL STRIP.AUTO: ABNORMAL
IMM GRANULOCYTES # BLD AUTO: 0.02 K/UL (ref 0–0.3)
IMM GRANULOCYTES # BLD AUTO: 0.03 K/UL (ref 0–0.3)
IMM GRANULOCYTES NFR BLD: 0 %
IMM GRANULOCYTES NFR BLD: 0 %
INR PPP: 1.4
KETONES UR STRIP-MCNC: NEGATIVE MG/DL
LEUKOCYTE ESTERASE UR QL STRIP: ABNORMAL
LIPASE SERPL-CCNC: 81 U/L (ref 13–60)
LYMPHOCYTES NFR BLD: 1.47 K/UL (ref 1.1–3.7)
LYMPHOCYTES NFR BLD: 1.56 K/UL (ref 1.1–3.7)
LYMPHOCYTES RELATIVE PERCENT: 17 % (ref 24–43)
LYMPHOCYTES RELATIVE PERCENT: 17 % (ref 24–43)
MAGNESIUM SERPL-MCNC: 1.5 MG/DL (ref 1.6–2.6)
MAGNESIUM SERPL-MCNC: 1.6 MG/DL (ref 1.6–2.6)
MCH RBC QN AUTO: 30.2 PG (ref 25.2–33.5)
MCH RBC QN AUTO: 30.9 PG (ref 25.2–33.5)
MCHC RBC AUTO-ENTMCNC: 30.1 G/DL (ref 28.4–34.8)
MCHC RBC AUTO-ENTMCNC: 30.8 G/DL (ref 28.4–34.8)
MCV RBC AUTO: 100.2 FL (ref 82.6–102.9)
MCV RBC AUTO: 100.3 FL (ref 82.6–102.9)
MONOCYTES NFR BLD: 0.76 K/UL (ref 0.1–1.2)
MONOCYTES NFR BLD: 0.8 K/UL (ref 0.1–1.2)
MONOCYTES NFR BLD: 8 % (ref 3–12)
MONOCYTES NFR BLD: 9 % (ref 3–12)
NEUTROPHILS NFR BLD: 72 % (ref 36–65)
NEUTROPHILS NFR BLD: 73 % (ref 36–65)
NEUTS SEG NFR BLD: 6.24 K/UL (ref 1.5–8.1)
NEUTS SEG NFR BLD: 6.92 K/UL (ref 1.5–8.1)
NITRITE UR QL STRIP: NEGATIVE
NRBC BLD-RTO: 0 PER 100 WBC
NRBC BLD-RTO: 0 PER 100 WBC
PH UR STRIP: 6.5 [PH] (ref 5–8)
PLATELET # BLD AUTO: 432 K/UL (ref 138–453)
PLATELET # BLD AUTO: 448 K/UL (ref 138–453)
PMV BLD AUTO: 9.4 FL (ref 8.1–13.5)
PMV BLD AUTO: 9.9 FL (ref 8.1–13.5)
POTASSIUM SERPL-SCNC: 3.5 MMOL/L (ref 3.7–5.3)
POTASSIUM SERPL-SCNC: 5.2 MMOL/L (ref 3.7–5.3)
PROT SERPL-MCNC: 6.9 G/DL (ref 6.4–8.3)
PROT UR STRIP-MCNC: ABNORMAL MG/DL
PROTHROMBIN TIME: 16.4 SEC (ref 11.5–14.2)
RBC # BLD AUTO: 3.94 M/UL (ref 3.95–5.11)
RBC # BLD AUTO: 4.08 M/UL (ref 3.95–5.11)
RBC # BLD: ABNORMAL 10*6/UL
RBC # BLD: ABNORMAL 10*6/UL
RBC #/AREA URNS HPF: ABNORMAL /HPF (ref 0–2)
SARS-COV-2 RNA RESP QL NAA+PROBE: NOT DETECTED
SODIUM SERPL-SCNC: 132 MMOL/L (ref 135–144)
SODIUM SERPL-SCNC: 138 MMOL/L (ref 135–144)
SOURCE: NORMAL
SP GR UR STRIP: 1.01 (ref 1–1.03)
SPECIMEN DESCRIPTION: NORMAL
T4 FREE SERPL-MCNC: 1.4 NG/DL (ref 0.9–1.7)
TSH SERPL DL<=0.05 MIU/L-ACNC: 8.35 UIU/ML (ref 0.3–5)
UROBILINOGEN UR STRIP-ACNC: NORMAL EU/DL (ref 0–1)
WBC #/AREA URNS HPF: ABNORMAL /HPF (ref 0–5)
WBC OTHER # BLD: 8.7 K/UL (ref 3.5–11.3)
WBC OTHER # BLD: 9.4 K/UL (ref 3.5–11.3)

## 2023-01-01 PROCEDURE — 99232 SBSQ HOSP IP/OBS MODERATE 35: CPT | Performed by: NURSE PRACTITIONER

## 2023-01-01 PROCEDURE — 80048 BASIC METABOLIC PNL TOTAL CA: CPT

## 2023-01-01 PROCEDURE — 6360000002 HC RX W HCPCS: Performed by: NURSE PRACTITIONER

## 2023-01-01 PROCEDURE — G0378 HOSPITAL OBSERVATION PER HR: HCPCS

## 2023-01-01 PROCEDURE — 36415 COLL VENOUS BLD VENIPUNCTURE: CPT

## 2023-01-01 PROCEDURE — 96375 TX/PRO/DX INJ NEW DRUG ADDON: CPT

## 2023-01-01 PROCEDURE — 83690 ASSAY OF LIPASE: CPT

## 2023-01-01 PROCEDURE — 6370000000 HC RX 637 (ALT 250 FOR IP): Performed by: NURSE PRACTITIONER

## 2023-01-01 PROCEDURE — 83735 ASSAY OF MAGNESIUM: CPT

## 2023-01-01 PROCEDURE — 85610 PROTHROMBIN TIME: CPT

## 2023-01-01 PROCEDURE — 85025 COMPLETE CBC W/AUTO DIFF WBC: CPT

## 2023-01-01 PROCEDURE — 2580000003 HC RX 258: Performed by: NURSE PRACTITIONER

## 2023-01-01 PROCEDURE — 80076 HEPATIC FUNCTION PANEL: CPT

## 2023-01-01 PROCEDURE — 84443 ASSAY THYROID STIM HORMONE: CPT

## 2023-01-01 PROCEDURE — 96365 THER/PROPH/DIAG IV INF INIT: CPT

## 2023-01-01 PROCEDURE — 96376 TX/PRO/DX INJ SAME DRUG ADON: CPT

## 2023-01-01 PROCEDURE — 84439 ASSAY OF FREE THYROXINE: CPT

## 2023-01-01 PROCEDURE — 1200000000 HC SEMI PRIVATE

## 2023-01-01 PROCEDURE — 87636 SARSCOV2 & INF A&B AMP PRB: CPT

## 2023-01-01 PROCEDURE — 99223 1ST HOSP IP/OBS HIGH 75: CPT | Performed by: NURSE PRACTITIONER

## 2023-01-01 PROCEDURE — 99285 EMERGENCY DEPT VISIT HI MDM: CPT

## 2023-01-01 PROCEDURE — 93005 ELECTROCARDIOGRAM TRACING: CPT | Performed by: EMERGENCY MEDICINE

## 2023-01-01 PROCEDURE — 92610 EVALUATE SWALLOWING FUNCTION: CPT

## 2023-01-01 PROCEDURE — 81001 URINALYSIS AUTO W/SCOPE: CPT

## 2023-01-01 RX ORDER — SODIUM CHLORIDE 0.9 % (FLUSH) 0.9 %
5-40 SYRINGE (ML) INJECTION EVERY 12 HOURS SCHEDULED
Status: DISCONTINUED | OUTPATIENT
Start: 2023-01-01 | End: 2023-01-01 | Stop reason: HOSPADM

## 2023-01-01 RX ORDER — MIDODRINE HYDROCHLORIDE 2.5 MG/1
2.5 TABLET ORAL 2 TIMES DAILY PRN
Status: ON HOLD | COMMUNITY
End: 2023-01-01 | Stop reason: HOSPADM

## 2023-01-01 RX ORDER — ACETAMINOPHEN 325 MG/1
650 TABLET ORAL EVERY 6 HOURS PRN
Status: DISCONTINUED | OUTPATIENT
Start: 2023-01-01 | End: 2023-01-01 | Stop reason: HOSPADM

## 2023-01-01 RX ORDER — ONDANSETRON 4 MG/1
4 TABLET, ORALLY DISINTEGRATING ORAL EVERY 8 HOURS PRN
Status: DISCONTINUED | OUTPATIENT
Start: 2023-01-01 | End: 2023-01-01 | Stop reason: HOSPADM

## 2023-01-01 RX ORDER — MAGNESIUM SULFATE 1 G/100ML
1000 INJECTION INTRAVENOUS PRN
Status: DISCONTINUED | OUTPATIENT
Start: 2023-01-01 | End: 2023-01-01 | Stop reason: HOSPADM

## 2023-01-01 RX ORDER — MIDODRINE HYDROCHLORIDE 5 MG/1
5 TABLET ORAL ONCE
Status: COMPLETED | OUTPATIENT
Start: 2023-01-01 | End: 2023-01-01

## 2023-01-01 RX ORDER — CLOPIDOGREL BISULFATE 75 MG/1
75 TABLET ORAL DAILY
Status: DISCONTINUED | OUTPATIENT
Start: 2023-01-01 | End: 2023-01-01 | Stop reason: HOSPADM

## 2023-01-01 RX ORDER — POTASSIUM CHLORIDE 750 MG/1
20 CAPSULE, EXTENDED RELEASE ORAL DAILY
Status: ON HOLD | COMMUNITY
End: 2023-01-01 | Stop reason: HOSPADM

## 2023-01-01 RX ORDER — ACETAMINOPHEN 650 MG/1
650 SUPPOSITORY RECTAL EVERY 6 HOURS PRN
Status: DISCONTINUED | OUTPATIENT
Start: 2023-01-01 | End: 2023-01-01 | Stop reason: HOSPADM

## 2023-01-01 RX ORDER — POLYETHYLENE GLYCOL 3350 17 G/17G
17 POWDER, FOR SOLUTION ORAL DAILY PRN
Status: DISCONTINUED | OUTPATIENT
Start: 2023-01-01 | End: 2023-01-01 | Stop reason: HOSPADM

## 2023-01-01 RX ORDER — POTASSIUM CHLORIDE 7.45 MG/ML
10 INJECTION INTRAVENOUS PRN
Status: DISCONTINUED | OUTPATIENT
Start: 2023-01-01 | End: 2023-01-01 | Stop reason: HOSPADM

## 2023-01-01 RX ORDER — ONDANSETRON 2 MG/ML
4 INJECTION INTRAMUSCULAR; INTRAVENOUS EVERY 6 HOURS PRN
Status: DISCONTINUED | OUTPATIENT
Start: 2023-01-01 | End: 2023-01-01 | Stop reason: HOSPADM

## 2023-01-01 RX ORDER — SODIUM CHLORIDE 9 MG/ML
INJECTION, SOLUTION INTRAVENOUS PRN
Status: DISCONTINUED | OUTPATIENT
Start: 2023-01-01 | End: 2023-01-01 | Stop reason: HOSPADM

## 2023-01-01 RX ORDER — PANTOPRAZOLE SODIUM 40 MG/1
40 TABLET, DELAYED RELEASE ORAL
Status: DISCONTINUED | OUTPATIENT
Start: 2023-01-01 | End: 2023-01-01 | Stop reason: HOSPADM

## 2023-01-01 RX ORDER — FUROSEMIDE 40 MG/1
40 TABLET ORAL 2 TIMES DAILY
Status: CANCELLED | OUTPATIENT
Start: 2023-01-01

## 2023-01-01 RX ORDER — KETOROLAC TROMETHAMINE 15 MG/ML
15 INJECTION, SOLUTION INTRAMUSCULAR; INTRAVENOUS ONCE
Status: COMPLETED | OUTPATIENT
Start: 2023-01-01 | End: 2023-01-01

## 2023-01-01 RX ORDER — METOPROLOL SUCCINATE 50 MG/1
50 TABLET, EXTENDED RELEASE ORAL DAILY
Qty: 90 TABLET | Status: CANCELLED | OUTPATIENT
Start: 2023-01-01

## 2023-01-01 RX ORDER — ATORVASTATIN CALCIUM 80 MG/1
80 TABLET, FILM COATED ORAL DAILY
Status: DISCONTINUED | OUTPATIENT
Start: 2023-01-01 | End: 2023-01-01 | Stop reason: HOSPADM

## 2023-01-01 RX ORDER — LOSARTAN POTASSIUM 25 MG/1
25 TABLET ORAL DAILY
Status: DISCONTINUED | OUTPATIENT
Start: 2023-01-01 | End: 2023-01-01 | Stop reason: HOSPADM

## 2023-01-01 RX ORDER — BRIMONIDINE TARTRATE 2 MG/ML
1 SOLUTION/ DROPS OPHTHALMIC 2 TIMES DAILY
Status: DISCONTINUED | OUTPATIENT
Start: 2023-01-01 | End: 2023-01-01 | Stop reason: HOSPADM

## 2023-01-01 RX ORDER — SODIUM CHLORIDE 0.9 % (FLUSH) 0.9 %
10 SYRINGE (ML) INJECTION PRN
Status: DISCONTINUED | OUTPATIENT
Start: 2023-01-01 | End: 2023-01-01 | Stop reason: HOSPADM

## 2023-01-01 RX ORDER — LORAZEPAM 2 MG/ML
1 INJECTION INTRAMUSCULAR EVERY 6 HOURS PRN
Status: DISCONTINUED | OUTPATIENT
Start: 2023-01-01 | End: 2023-01-01 | Stop reason: HOSPADM

## 2023-01-01 RX ORDER — FUROSEMIDE 10 MG/ML
40 INJECTION INTRAMUSCULAR; INTRAVENOUS 2 TIMES DAILY
Status: DISCONTINUED | OUTPATIENT
Start: 2023-01-01 | End: 2023-01-01 | Stop reason: HOSPADM

## 2023-01-01 RX ORDER — MIDODRINE HYDROCHLORIDE 2.5 MG/1
2.5 TABLET ORAL
Status: DISCONTINUED | OUTPATIENT
Start: 2023-01-01 | End: 2023-01-01 | Stop reason: HOSPADM

## 2023-01-01 RX ORDER — METOPROLOL SUCCINATE 25 MG/1
25 TABLET, EXTENDED RELEASE ORAL DAILY
Status: DISCONTINUED | OUTPATIENT
Start: 2023-01-01 | End: 2023-01-01 | Stop reason: HOSPADM

## 2023-01-01 RX ORDER — LEVOTHYROXINE SODIUM 0.07 MG/1
75 TABLET ORAL DAILY
Status: DISCONTINUED | OUTPATIENT
Start: 2023-01-01 | End: 2023-01-01 | Stop reason: HOSPADM

## 2023-01-01 RX ORDER — POTASSIUM CHLORIDE 20 MEQ/1
40 TABLET, EXTENDED RELEASE ORAL PRN
Status: DISCONTINUED | OUTPATIENT
Start: 2023-01-01 | End: 2023-01-01 | Stop reason: HOSPADM

## 2023-01-01 RX ADMIN — LORAZEPAM 1 MG: 2 INJECTION INTRAMUSCULAR; INTRAVENOUS at 16:18

## 2023-01-01 RX ADMIN — FUROSEMIDE 40 MG: 10 INJECTION, SOLUTION INTRAMUSCULAR; INTRAVENOUS at 17:06

## 2023-01-01 RX ADMIN — FUROSEMIDE 40 MG: 10 INJECTION, SOLUTION INTRAMUSCULAR; INTRAVENOUS at 21:32

## 2023-01-01 RX ADMIN — SODIUM CHLORIDE, PRESERVATIVE FREE 10 ML: 5 INJECTION INTRAVENOUS at 21:32

## 2023-01-01 RX ADMIN — Medication 10 MG: at 02:12

## 2023-01-01 RX ADMIN — MIDODRINE HYDROCHLORIDE 5 MG: 5 TABLET ORAL at 01:22

## 2023-01-01 RX ADMIN — BRIMONIDINE TARTRATE 1 DROP: 2 SOLUTION OPHTHALMIC at 17:13

## 2023-01-01 RX ADMIN — FUROSEMIDE 40 MG: 10 INJECTION, SOLUTION INTRAMUSCULAR; INTRAVENOUS at 12:01

## 2023-01-01 RX ADMIN — Medication 10 MG: at 16:07

## 2023-01-01 RX ADMIN — BRIMONIDINE TARTRATE 1 DROP: 2 SOLUTION OPHTHALMIC at 23:30

## 2023-01-01 RX ADMIN — BRIMONIDINE TARTRATE 1 DROP: 2 SOLUTION OPHTHALMIC at 10:54

## 2023-01-01 RX ADMIN — MAGNESIUM SULFATE HEPTAHYDRATE 1000 MG: 1 INJECTION, SOLUTION INTRAVENOUS at 17:10

## 2023-01-01 RX ADMIN — KETOROLAC TROMETHAMINE 15 MG: 15 INJECTION, SOLUTION INTRAMUSCULAR; INTRAVENOUS at 03:06

## 2023-01-01 RX ADMIN — BRIMONIDINE TARTRATE 1 DROP: 2 SOLUTION OPHTHALMIC at 12:37

## 2023-01-01 RX ADMIN — SODIUM CHLORIDE, PRESERVATIVE FREE 10 ML: 5 INJECTION INTRAVENOUS at 09:00

## 2023-01-01 RX ADMIN — SODIUM CHLORIDE, PRESERVATIVE FREE 10 ML: 5 INJECTION INTRAVENOUS at 03:07

## 2023-01-01 RX ADMIN — APIXABAN 2.5 MG: 2.5 TABLET, FILM COATED ORAL at 23:29

## 2023-01-01 ASSESSMENT — PAIN DESCRIPTION - ORIENTATION: ORIENTATION: MID

## 2023-01-01 ASSESSMENT — ENCOUNTER SYMPTOMS
SHORTNESS OF BREATH: 0
WHEEZING: 0
VOMITING: 0
SINUS PAIN: 0
SHORTNESS OF BREATH: 1
COLOR CHANGE: 0
ABDOMINAL PAIN: 1
NAUSEA: 0
COUGH: 0
PHOTOPHOBIA: 0
EYE REDNESS: 0
COUGH: 1
SORE THROAT: 0
ABDOMINAL DISTENTION: 1
RHINORRHEA: 0
SINUS PRESSURE: 0
CONSTIPATION: 0
DIARRHEA: 0
EYE DISCHARGE: 0

## 2023-01-01 ASSESSMENT — PAIN SCALES - GENERAL
PAINLEVEL_OUTOF10: 7
PAINLEVEL_OUTOF10: 3
PAINLEVEL_OUTOF10: 3

## 2023-01-01 ASSESSMENT — LIFESTYLE VARIABLES
HOW OFTEN DO YOU HAVE A DRINK CONTAINING ALCOHOL: NEVER
HOW MANY STANDARD DRINKS CONTAINING ALCOHOL DO YOU HAVE ON A TYPICAL DAY: PATIENT DOES NOT DRINK
HOW OFTEN DO YOU HAVE A DRINK CONTAINING ALCOHOL: NEVER

## 2023-01-01 ASSESSMENT — PAIN DESCRIPTION - PAIN TYPE: TYPE: ACUTE PAIN

## 2023-01-01 ASSESSMENT — PAIN DESCRIPTION - ONSET: ONSET: GRADUAL

## 2023-01-01 ASSESSMENT — PAIN DESCRIPTION - DESCRIPTORS
DESCRIPTORS: ACHING;SORE
DESCRIPTORS: SORE;ACHING

## 2023-01-01 ASSESSMENT — PAIN DESCRIPTION - FREQUENCY: FREQUENCY: INTERMITTENT

## 2023-01-01 ASSESSMENT — PAIN - FUNCTIONAL ASSESSMENT
PAIN_FUNCTIONAL_ASSESSMENT: ACTIVITIES ARE NOT PREVENTED
PAIN_FUNCTIONAL_ASSESSMENT: NONE - DENIES PAIN

## 2023-01-01 ASSESSMENT — PAIN DESCRIPTION - LOCATION
LOCATION: THROAT
LOCATION: GENERALIZED
LOCATION: THROAT

## 2023-01-03 ENCOUNTER — HOSPITAL ENCOUNTER (OUTPATIENT)
Dept: WOUND CARE | Age: 88
Discharge: HOME | End: 2023-01-03
Payer: MEDICARE

## 2023-01-03 VITALS
SYSTOLIC BLOOD PRESSURE: 152 MMHG | RESPIRATION RATE: 16 BRPM | TEMPERATURE: 97.2 F | HEART RATE: 72 BPM | DIASTOLIC BLOOD PRESSURE: 83 MMHG

## 2023-01-03 DIAGNOSIS — S81.802A OPEN WOUND OF LEFT LOWER LEG, INITIAL ENCOUNTER: Primary | ICD-10-CM

## 2023-01-03 PROCEDURE — 11042 DBRDMT SUBQ TIS 1ST 20SQCM/<: CPT | Performed by: NURSE PRACTITIONER

## 2023-01-03 PROCEDURE — 11042 DBRDMT SUBQ TIS 1ST 20SQCM/<: CPT

## 2023-01-03 RX ORDER — LIDOCAINE HYDROCHLORIDE 20 MG/ML
JELLY TOPICAL ONCE
Status: COMPLETED | OUTPATIENT
Start: 2023-01-03 | End: 2023-01-03

## 2023-01-03 RX ORDER — LIDOCAINE HYDROCHLORIDE 20 MG/ML
JELLY TOPICAL ONCE
OUTPATIENT
Start: 2023-01-03 | End: 2023-01-03

## 2023-01-03 RX ORDER — LIDOCAINE HYDROCHLORIDE 40 MG/ML
SOLUTION TOPICAL ONCE
OUTPATIENT
Start: 2023-01-03 | End: 2023-01-03

## 2023-01-03 RX ADMIN — LIDOCAINE HYDROCHLORIDE 6 ML: 20 JELLY TOPICAL at 10:22

## 2023-01-03 ASSESSMENT — ENCOUNTER SYMPTOMS
DIARRHEA: 0
NAUSEA: 0
VOMITING: 0
SHORTNESS OF BREATH: 0
COUGH: 0
RHINORRHEA: 0

## 2023-01-03 NOTE — PROGRESS NOTES
[address]   Progress Note and Procedure Note      Zayda RECORD NUMBER:  [id number]  AGE: 82 y.o. GENDER: female  : 1935  EPISODE DATE:  1/3/2023    Subjective:     Chief Complaint   Patient presents with    Wound Check     LLE         HISTORY of PRESENT ILLNESS HPI     Hitesh Huynh is a 82 y.o. female who presents today for wound/ulcer evaluation. History of Wound Context: here to follow up on left lower leg wound that continues to heal well. No signs or symptoms of infection on exam today. Wound/Ulcer Pain Timing/Severity: intermittent  Quality of pain: aching  Severity:  2  10   Modifying Factors: None  Associated Signs/Symptoms: none    Ulcer Identification:  Ulcer Type: traumatic  Contributing Factors: edema         PAST MEDICAL HISTORY        Diagnosis Date    Bowel obstruction (HCC)     Broken hip (HCC)     Diverticulitis     DVT (deep venous thrombosis) (HCC)     Heart attack (HCC)     MRSA (methicillin resistant staph aureus) culture positive        PAST SURGICAL HISTORY    Past Surgical History:   Procedure Laterality Date    TOTAL HIP ARTHROPLASTY         FAMILY HISTORY    History reviewed. No pertinent family history. SOCIAL HISTORY    Social History     Tobacco Use    Smoking status: Never    Smokeless tobacco: Never   Substance Use Topics    Alcohol use:  Yes     Alcohol/week: 3.0 standard drinks     Types: 3 Glasses of wine per week    Drug use: Never       ALLERGIES    No Known Allergies    MEDICATIONS    Current Outpatient Medications on File Prior to Encounter   Medication Sig Dispense Refill    potassium chloride (KLOR-CON M) 10 MEQ extended release tablet TAKE TWO TABLETS BY MOUTH DAILY 60 tablet 5    Multiple Vitamins-Minerals (THERAPEUTIC MULTIVITAMIN-MINERALS) tablet Take 1 tablet by mouth daily      magnesium gluconate (MAGONATE) 500 MG tablet Take 250 mg by mouth daily      atorvastatin (LIPITOR) 80 MG tablet TAKE ONE TABLET BY MOUTH DAILY 30 tablet 5    furosemide (LASIX) 40 MG tablet TAKE ONE TABLET BY MOUTH TWICE A DAY 60 tablet 5    omeprazole (PRILOSEC) 20 MG delayed release capsule TAKE ONE CAPSULE BY MOUTH EVERY MORNING BEFORE BREAKFAST 90 capsule 1    losartan (COZAAR) 50 MG tablet TAKE ONE TABLET BY MOUTH DAILY 90 tablet 2    lidocaine 1 % injection 8 mLs      potassium chloride (KLOR-CON) 20 MEQ packet potassium chloride 20 mEq oral packet      triamcinolone acetonide (KENALOG) 10 MG/ML injection 6 mLs      nitroGLYCERIN (NITROSTAT) 0.4 MG SL tablet Place 1 tablet under the tongue every 5 minutes as needed for Chest pain 25 tablet 5    latanoprost (XALATAN) 0.005 % ophthalmic solution Place 1 drop into both eyes daily 1 Bottle 3    aspirin 81 MG tablet Take 81 mg by mouth      brimonidine (ALPHAGAN) 0.2 % ophthalmic solution Apply 1 drop to eye      carvedilol (COREG) 3.125 MG tablet Take 3.125 mg by mouth      ibandronate (BONIVA) 150 MG tablet Take 1 tablet by mouth every 30 days 30 tablet 5     No current facility-administered medications on file prior to encounter. REVIEW OF SYSTEMS    Review of Systems   Constitutional:  Negative for chills, fatigue and fever. HENT:  Negative for congestion and rhinorrhea. Respiratory:  Negative for cough and shortness of breath. Cardiovascular:  Negative for chest pain and leg swelling. Gastrointestinal:  Negative for diarrhea, nausea and vomiting. Musculoskeletal:  Negative for gait problem. Skin:  Positive for wound (left lower leg wound). Allergic/Immunologic: Negative for immunocompromised state. Neurological:  Negative for dizziness, syncope and weakness. Psychiatric/Behavioral:  Negative for agitation. The patient is not nervous/anxious.       Objective:      BP (!) 152/83   Pulse 72   Temp 97.2 °F (36.2 °C)   Resp 16     Wt Readings from Last 3 Encounters:   11/28/22 138 lb (62.6 kg)   11/21/22 138 lb (62.6 kg)   08/29/22 142 lb (64.4 kg)       PHYSICAL EXAM    General Appearance: alert and oriented to person, place and time, well-developed and well-nourished, in no acute distress  Skin: warm and dry, no rash or erythema, left lower leg wound   Head: normocephalic and atraumatic  Eyes: pupils equal, round and conjunctivae normal  Pulmonary/Chest: normal air movement, no respiratory distress  Extremities: no cyanosis and no clubbing or edema   Musculoskeletal: no joint swelling, deformity or tenderness  Neurologic: gait, coordination normal and speech normal      Assessment:     Problem List Items Addressed This Visit       Open wound of left lower leg - Primary    Relevant Orders    Initiate Outpatient Wound Care Protocol        Procedure Note  Indications:  Based on my examination of this patient's wound(s)/ulcer(s) today, debridement is required to promote healing and evaluate the wound base. Performed by: FLORIAN Solomon CNP    Consent obtained:  Yes    Time out taken:  Yes    Pain Control: Anesthetic  Anesthetic: 2% Lidocaine Gel Topical     Debridement:Excisional Debridement    Using curette the wound(s)/ulcer(s) was/were sharply debrided down through and including the removal of subcutaneous tissue. Devitalized Tissue Debrided:  biofilm and slough    Pre Debridement Measurements:  Are located in the [facility]  Documentation Flow Sheet    Wound/Ulcer #: 1    Post Debridement Measurements:  Wound/Ulcer Descriptions are Pre Debridement except measurements:    Wound 11/21/22 Leg Left; Lower; Posterior #1 (Active)   Wound Image   11/21/22 0848   Wound Etiology Traumatic 01/03/23 1014   Dressing Status Old drainage noted;New drainage noted 01/03/23 1014   Wound Cleansed Cleansed with saline 01/03/23 1014   Wound Length (cm) 0.7 cm 01/03/23 1014   Wound Width (cm) 0.6 cm 01/03/23 1014   Wound Depth (cm) 0.1 cm 01/03/23 1014   Wound Surface Area (cm^2) 0.42 cm^2 01/03/23 1014   Change in Wound Size % (l*w) 82.35 01/03/23 1014   Wound Volume (cm^3) 0.042 cm^3 01/03/23 1014   Wound Healing % 82 01/03/23 1014   Post-Procedure Length (cm) 0.7 cm 01/03/23 1014   Post-Procedure Width (cm) 0.6 cm 01/03/23 1014   Post-Procedure Depth (cm) 0.1 cm 01/03/23 1014   Post-Procedure Surface Area (cm^2) 0.42 cm^2 01/03/23 1014   Post-Procedure Volume (cm^3) 0.042 cm^3 01/03/23 1014   Wound Assessment Pink/red;Granulation tissue 01/03/23 1014   Drainage Amount Moderate 01/03/23 1014   Drainage Description Serous 01/03/23 1014   Odor None 01/03/23 1014   Kate-wound Assessment Blanchable erythema 01/03/23 1014   Margins Defined edges 01/03/23 1014   Wound Thickness Description not for Pressure Injury Full thickness 01/03/23 1014   Number of days: 43          Percent of Wound(s)/Ulcer(s) Debrided: 100%    Total Surface Area Debrided:  0.42 sq cm     Estimated Blood Loss:  Minimal    Hemostasis Achieved:  by pressure    Procedural Pain:  2  / 10     Post Procedural Pain:  0 / 10     Response to treatment:  Well tolerated by patient. Plan:     Treatment Note please see Discharge Instructions    Written patient dismissal instructions given to patient and signed by patient or POA.            Electronically signed by FLORIAN Benoit CNP on 1/3/2023 at 10:36 AM

## 2023-01-09 NOTE — DISCHARGE INSTRUCTIONS
[address] [facility],[address] -Phone: [phone] Fax: [phone]    Visit  Discharge Instructions / Physician Orders     DATE: 1/16/2023     Home Care: N/A     SUPPLIES ORDERED THRU: [facility]      Wound Location: Left Lower posterior Leg      Cleanse with: Liquid antibacterial soap and water, rinse well      Dressing Orders: Normal-saline moistened Yecenia, Gentac, Compression sock     Frequency: DAILY     Additional Orders: Increase protein to diet (meat, cheese, eggs, fish, peanut butter, nuts and beans)  Multivitamin daily  ELEVATE LEGS AS MUCH AS POSSIBLE  11/21/22-Culture taken     Your next appointment with [facility] is in 2 weeks with Dr. Nader Grove     (Please note your next appointment above and if you are unable to keep, kindly give a 24 hour notice. Thank you.)  If more than 15 min late we cannot guarantee you will be seen due to clinician schedule  Per Policy, Excessive cancellation will call for dismissal from program.     If you experience any of the following, please call the [employer] [facility] [place] during business hours:  [phone]  Your Phone call may be forwarded to [employer] during business hours that [facility] is closed. * Increase in Pain  * Temperature over 101  * Increase in drainage from your wound  * Drainage with a foul odor  * Bleeding  * Increase in swelling  * Need for compression bandage changes due to slippage, breakthrough drainage. If you need medical attention outside of the business hours of the [facility] please contact your PCP or go to the nearest emergency room. The information contained in the After Visit Summary has been reviewed with me, the patient and/or responsible adult, by my health care provider(s). I had the opportunity to ask questions regarding this information.  I have elected to receive;      []After Visit Summary  [x]Comprehensive Discharge Instruction        Patient signature______________________________________Date:________

## 2023-01-16 ENCOUNTER — HOSPITAL ENCOUNTER (OUTPATIENT)
Dept: WOUND CARE | Age: 88
Discharge: HOME | End: 2023-01-16

## 2023-01-18 NOTE — DISCHARGE INSTRUCTIONS
[address] [facility],[address] -Phone: [phone] Fax: [phone]    Visit  Discharge Instructions / Physician Orders     DATE: 1/24/2023     Home Care: N/A     SUPPLIES ORDERED THRU: [facility]      Wound Location: Left Lower posterior Leg      Cleanse with: Liquid antibacterial soap and water, rinse well      Dressing Orders: Normal-saline moistened Yecenia, Gentac, Compression sock     Frequency: DAILY     Additional Orders: Increase protein to diet (meat, cheese, eggs, fish, peanut butter, nuts and beans)  Multivitamin daily  ELEVATE LEGS AS MUCH AS POSSIBLE  11/21/22-Culture taken     [facility] Dr. Arnold Vasquez     (Please note your next appointment above and if you are unable to keep, kindly give a 24 hour notice. Thank you.)  If more than 15 min late we cannot guarantee you will be seen due to clinician schedule  Per Policy, Excessive cancellation will call for dismissal from program.     If you experience any of the following, please call the [address] [place] [address] during business hours:  [phone]  Your Phone call may be forwarded to [phone] during business hours that [facility] is closed. * Increase in Pain  * Temperature over 101  * Increase in drainage from your wound  * Drainage with a foul odor  * Bleeding  * Increase in swelling  * Need for compression bandage changes due to slippage, breakthrough drainage. If you need medical attention outside of the business hours of the [address] [place] [address] please contact your PCP or go to the nearest emergency room. The information contained in the After Visit Summary has been reviewed with me, the patient and/or responsible adult, by my health care provider(s). I had the opportunity to ask questions regarding this information.  I have elected to receive;      []After Visit Summary  [x]Comprehensive Discharge Instruction        Patient signature______________________________________Date:________

## 2023-01-24 ENCOUNTER — HOSPITAL ENCOUNTER (OUTPATIENT)
Dept: WOUND CARE | Age: 88
Discharge: HOME | End: 2023-01-24

## 2023-03-02 ENCOUNTER — HOSPITAL ENCOUNTER (OUTPATIENT)
Age: 88
Setting detail: SPECIMEN
Discharge: HOME | End: 2023-03-02

## 2023-03-02 DIAGNOSIS — N28.9 DECREASED RENAL FUNCTION: ICD-10-CM

## 2023-03-02 LAB
ANION GAP SERPL CALCULATED.3IONS-SCNC: 14 MMOL/L (ref 9–17)
BUN SERPL-MCNC: 31 MG/DL (ref 8–23)
CALCIUM SERPL-MCNC: 9.4 MG/DL (ref 8.6–10.4)
CHLORIDE SERPL-SCNC: 104 MMOL/L (ref 98–107)
CO2 SERPL-SCNC: 21 MMOL/L (ref 20–31)
CREAT SERPL-MCNC: 1.04 MG/DL (ref 0.5–0.9)
GFR SERPL CREATININE-BSD FRML MDRD: 53 ML/MIN/1.73M2
GLUCOSE SERPL-MCNC: 117 MG/DL (ref 70–99)
POTASSIUM SERPL-SCNC: 5.1 MMOL/L (ref 3.7–5.3)
SODIUM SERPL-SCNC: 139 MMOL/L (ref 135–144)

## 2023-03-29 ENCOUNTER — TELEPHONE (OUTPATIENT)
Dept: FAMILY MEDICINE CLINIC | Age: 88
End: 2023-03-29

## 2023-03-29 DIAGNOSIS — G47.9 SLEEP DISTURBANCE: Primary | ICD-10-CM

## 2023-03-29 RX ORDER — TRAZODONE HYDROCHLORIDE 50 MG/1
50 TABLET ORAL NIGHTLY
Qty: 30 TABLET | Refills: 0 | Status: SHIPPED | OUTPATIENT
Start: 2023-03-29

## 2023-03-29 NOTE — TELEPHONE ENCOUNTER
Patient contacted the office and states that she hasn't slept since Saturday 03/25. Patient stated she did finally get a little sleep in her chair yesterday. Patient is asking for a medication to help her sleep. Please advise.

## 2023-04-24 DIAGNOSIS — G47.9 SLEEP DISTURBANCE: ICD-10-CM

## 2023-04-24 RX ORDER — TRAZODONE HYDROCHLORIDE 50 MG/1
TABLET ORAL
Qty: 30 TABLET | Refills: 2 | Status: ON HOLD
Start: 2023-04-24 | End: 2023-06-12 | Stop reason: HOSPADM

## 2023-05-22 DIAGNOSIS — E78.2 MIXED HYPERLIPIDEMIA: ICD-10-CM

## 2023-05-22 RX ORDER — ATORVASTATIN CALCIUM 80 MG/1
TABLET, FILM COATED ORAL
Qty: 30 TABLET | Refills: 5 | Status: SHIPPED | OUTPATIENT
Start: 2023-05-22

## 2023-06-03 ENCOUNTER — APPOINTMENT (OUTPATIENT)
Dept: CT IMAGING | Age: 88
End: 2023-06-03
Payer: MEDICARE

## 2023-06-03 ENCOUNTER — HOSPITAL ENCOUNTER (INPATIENT)
Age: 88
LOS: 5 days | Discharge: HOME | End: 2023-06-08
Attending: EMERGENCY MEDICINE | Admitting: INTERNAL MEDICINE
Payer: MEDICARE

## 2023-06-03 ENCOUNTER — APPOINTMENT (OUTPATIENT)
Dept: GENERAL RADIOLOGY | Age: 88
End: 2023-06-03
Payer: MEDICARE

## 2023-06-03 DIAGNOSIS — I50.9 ACUTE ON CHRONIC CONGESTIVE HEART FAILURE, UNSPECIFIED HEART FAILURE TYPE (HCC): Primary | ICD-10-CM

## 2023-06-03 DIAGNOSIS — I50.23 ACUTE ON CHRONIC SYSTOLIC HEART FAILURE (HCC): ICD-10-CM

## 2023-06-03 DIAGNOSIS — S81.802A OPEN WOUND OF LEFT LOWER LEG, INITIAL ENCOUNTER: ICD-10-CM

## 2023-06-03 PROBLEM — E87.1 HYPONATREMIA: Status: ACTIVE | Noted: 2023-06-03

## 2023-06-03 PROBLEM — R06.09 EXERTIONAL DYSPNEA: Status: ACTIVE | Noted: 2023-06-03

## 2023-06-03 PROBLEM — R60.0 BILATERAL LEG EDEMA: Status: ACTIVE | Noted: 2023-06-03

## 2023-06-03 PROBLEM — E78.5 HYPERLIPEMIA: Status: ACTIVE | Noted: 2023-06-03

## 2023-06-03 PROBLEM — I10 HYPERTENSION: Status: ACTIVE | Noted: 2023-06-03

## 2023-06-03 PROBLEM — I48.91 ATRIAL FIBRILLATION (HCC): Status: ACTIVE | Noted: 2023-06-03

## 2023-06-03 LAB
ANION GAP SERPL CALCULATED.3IONS-SCNC: 12 MMOL/L (ref 9–17)
BASOPHILS # BLD: 0.05 K/UL (ref 0–0.2)
BASOPHILS NFR BLD: 1 % (ref 0–2)
BNP SERPL-MCNC: 8666 PG/ML
BUN SERPL-MCNC: 23 MG/DL (ref 8–23)
BUN/CREAT SERPL: 19 (ref 9–20)
CALCIUM SERPL-MCNC: 8.9 MG/DL (ref 8.6–10.4)
CHLORIDE SERPL-SCNC: 89 MMOL/L (ref 98–107)
CO2 SERPL-SCNC: 28 MMOL/L (ref 20–31)
CREAT SERPL-MCNC: 1.18 MG/DL (ref 0.5–0.9)
EOSINOPHIL # BLD: 0.04 K/UL (ref 0–0.44)
EOSINOPHILS RELATIVE PERCENT: 1 % (ref 1–4)
ERYTHROCYTE [DISTWIDTH] IN BLOOD BY AUTOMATED COUNT: 17.1 % (ref 11.8–14.4)
GFR SERPL CREATININE-BSD FRML MDRD: 45 ML/MIN/1.73M2
GLUCOSE SERPL-MCNC: 108 MG/DL (ref 70–99)
HCT VFR BLD AUTO: 35.5 % (ref 36.3–47.1)
HGB BLD-MCNC: 11.2 G/DL (ref 11.9–15.1)
IMM GRANULOCYTES # BLD AUTO: 0.03 K/UL (ref 0–0.3)
IMM GRANULOCYTES NFR BLD: 0 %
LYMPHOCYTES # BLD: 22 % (ref 24–43)
LYMPHOCYTES NFR BLD: 1.54 K/UL (ref 1.1–3.7)
MCH RBC QN AUTO: 29.6 PG (ref 25.2–33.5)
MCHC RBC AUTO-ENTMCNC: 31.5 G/DL (ref 28.4–34.8)
MCV RBC AUTO: 93.9 FL (ref 82.6–102.9)
MONOCYTES NFR BLD: 0.96 K/UL (ref 0.1–1.2)
MONOCYTES NFR BLD: 13 % (ref 3–12)
MYOGLOBIN SERPL-MCNC: 77 NG/ML (ref 25–58)
NEUTROPHILS NFR BLD: 63 % (ref 36–65)
NEUTS SEG NFR BLD: 4.54 K/UL (ref 1.5–8.1)
NRBC AUTOMATED: 0 PER 100 WBC
PLATELET # BLD AUTO: 309 K/UL (ref 138–453)
PMV BLD AUTO: 9 FL (ref 8.1–13.5)
POTASSIUM SERPL-SCNC: 4 MMOL/L (ref 3.7–5.3)
RBC # BLD AUTO: 3.78 M/UL (ref 3.95–5.11)
RBC # BLD: ABNORMAL 10*6/UL
SODIUM SERPL-SCNC: 129 MMOL/L (ref 135–144)
TROPONIN I SERPL HS-MCNC: 28 NG/L (ref 0–14)
WBC OTHER # BLD: 7.2 K/UL (ref 3.5–11.3)

## 2023-06-03 PROCEDURE — 6360000004 HC RX CONTRAST MEDICATION: Performed by: EMERGENCY MEDICINE

## 2023-06-03 PROCEDURE — 93005 ELECTROCARDIOGRAM TRACING: CPT | Performed by: EMERGENCY MEDICINE

## 2023-06-03 PROCEDURE — 99222 1ST HOSP IP/OBS MODERATE 55: CPT | Performed by: NURSE PRACTITIONER

## 2023-06-03 PROCEDURE — 96360 HYDRATION IV INFUSION INIT: CPT

## 2023-06-03 PROCEDURE — 84484 ASSAY OF TROPONIN QUANT: CPT

## 2023-06-03 PROCEDURE — 83880 ASSAY OF NATRIURETIC PEPTIDE: CPT

## 2023-06-03 PROCEDURE — 83874 ASSAY OF MYOGLOBIN: CPT

## 2023-06-03 PROCEDURE — 36415 COLL VENOUS BLD VENIPUNCTURE: CPT

## 2023-06-03 PROCEDURE — 2580000003 HC RX 258: Performed by: NURSE PRACTITIONER

## 2023-06-03 PROCEDURE — 2500000003 HC RX 250 WO HCPCS: Performed by: NURSE PRACTITIONER

## 2023-06-03 PROCEDURE — 2580000003 HC RX 258: Performed by: EMERGENCY MEDICINE

## 2023-06-03 PROCEDURE — 2700000000 HC OXYGEN THERAPY PER DAY

## 2023-06-03 PROCEDURE — 6370000000 HC RX 637 (ALT 250 FOR IP): Performed by: NURSE PRACTITIONER

## 2023-06-03 PROCEDURE — 71045 X-RAY EXAM CHEST 1 VIEW: CPT

## 2023-06-03 PROCEDURE — 6360000002 HC RX W HCPCS: Performed by: EMERGENCY MEDICINE

## 2023-06-03 PROCEDURE — 2060000000 HC ICU INTERMEDIATE R&B

## 2023-06-03 PROCEDURE — 99285 EMERGENCY DEPT VISIT HI MDM: CPT

## 2023-06-03 PROCEDURE — 85027 COMPLETE CBC AUTOMATED: CPT

## 2023-06-03 PROCEDURE — 71260 CT THORAX DX C+: CPT

## 2023-06-03 PROCEDURE — 94761 N-INVAS EAR/PLS OXIMETRY MLT: CPT

## 2023-06-03 PROCEDURE — 80048 BASIC METABOLIC PNL TOTAL CA: CPT

## 2023-06-03 RX ORDER — CARVEDILOL 3.12 MG/1
3.12 TABLET ORAL 2 TIMES DAILY WITH MEALS
Status: DISCONTINUED | OUTPATIENT
Start: 2023-06-04 | End: 2023-06-03 | Stop reason: SDUPTHER

## 2023-06-03 RX ORDER — POTASSIUM CHLORIDE 7.45 MG/ML
10 INJECTION INTRAVENOUS PRN
Status: DISCONTINUED | OUTPATIENT
Start: 2023-06-03 | End: 2023-06-03

## 2023-06-03 RX ORDER — SPIRONOLACTONE 25 MG/1
25 TABLET ORAL DAILY
Status: ON HOLD | COMMUNITY
Start: 2023-05-31 | End: 2023-06-08 | Stop reason: HOSPADM

## 2023-06-03 RX ORDER — SODIUM CHLORIDE 9 MG/ML
INJECTION, SOLUTION INTRAVENOUS PRN
Status: DISCONTINUED | OUTPATIENT
Start: 2023-06-03 | End: 2023-06-08 | Stop reason: HOSPADM

## 2023-06-03 RX ORDER — FUROSEMIDE 10 MG/ML
20 INJECTION INTRAMUSCULAR; INTRAVENOUS ONCE
Status: COMPLETED | OUTPATIENT
Start: 2023-06-03 | End: 2023-06-03

## 2023-06-03 RX ORDER — POLYETHYLENE GLYCOL 3350 17 G/17G
17 POWDER, FOR SOLUTION ORAL DAILY PRN
Status: DISCONTINUED | OUTPATIENT
Start: 2023-06-03 | End: 2023-06-08 | Stop reason: HOSPADM

## 2023-06-03 RX ORDER — POTASSIUM CHLORIDE 20 MEQ/1
40 TABLET, EXTENDED RELEASE ORAL PRN
Status: DISCONTINUED | OUTPATIENT
Start: 2023-06-03 | End: 2023-06-03

## 2023-06-03 RX ORDER — ONDANSETRON 2 MG/ML
4 INJECTION INTRAMUSCULAR; INTRAVENOUS EVERY 6 HOURS PRN
Status: DISCONTINUED | OUTPATIENT
Start: 2023-06-03 | End: 2023-06-08 | Stop reason: HOSPADM

## 2023-06-03 RX ORDER — ACETAMINOPHEN 325 MG/1
650 TABLET ORAL EVERY 6 HOURS PRN
Status: DISCONTINUED | OUTPATIENT
Start: 2023-06-03 | End: 2023-06-08 | Stop reason: HOSPADM

## 2023-06-03 RX ORDER — 0.9 % SODIUM CHLORIDE 0.9 %
80 INTRAVENOUS SOLUTION INTRAVENOUS ONCE
Status: COMPLETED | OUTPATIENT
Start: 2023-06-03 | End: 2023-06-03

## 2023-06-03 RX ORDER — BUMETANIDE 0.25 MG/ML
1 INJECTION INTRAMUSCULAR; INTRAVENOUS 2 TIMES DAILY
Status: DISCONTINUED | OUTPATIENT
Start: 2023-06-03 | End: 2023-06-05

## 2023-06-03 RX ORDER — SODIUM CHLORIDE 1 G/1
1 TABLET ORAL
Status: DISCONTINUED | OUTPATIENT
Start: 2023-06-04 | End: 2023-06-06

## 2023-06-03 RX ORDER — PANTOPRAZOLE SODIUM 40 MG/1
40 TABLET, DELAYED RELEASE ORAL
Status: DISCONTINUED | OUTPATIENT
Start: 2023-06-04 | End: 2023-06-08 | Stop reason: HOSPADM

## 2023-06-03 RX ORDER — DILTIAZEM HYDROCHLORIDE 120 MG/1
120 CAPSULE, EXTENDED RELEASE ORAL DAILY
Status: ON HOLD | COMMUNITY
Start: 2023-05-10 | End: 2023-06-08 | Stop reason: HOSPADM

## 2023-06-03 RX ORDER — ACETAMINOPHEN 650 MG/1
650 SUPPOSITORY RECTAL EVERY 6 HOURS PRN
Status: DISCONTINUED | OUTPATIENT
Start: 2023-06-03 | End: 2023-06-08 | Stop reason: HOSPADM

## 2023-06-03 RX ORDER — ONDANSETRON 4 MG/1
4 TABLET, ORALLY DISINTEGRATING ORAL EVERY 8 HOURS PRN
Status: DISCONTINUED | OUTPATIENT
Start: 2023-06-03 | End: 2023-06-08 | Stop reason: HOSPADM

## 2023-06-03 RX ORDER — SODIUM CHLORIDE 0.9 % (FLUSH) 0.9 %
5-40 SYRINGE (ML) INJECTION EVERY 12 HOURS SCHEDULED
Status: DISCONTINUED | OUTPATIENT
Start: 2023-06-03 | End: 2023-06-08 | Stop reason: HOSPADM

## 2023-06-03 RX ORDER — LOSARTAN POTASSIUM 25 MG/1
25 TABLET ORAL DAILY
Status: DISCONTINUED | OUTPATIENT
Start: 2023-06-04 | End: 2023-06-07

## 2023-06-03 RX ORDER — CARVEDILOL 3.12 MG/1
3.12 TABLET ORAL 2 TIMES DAILY WITH MEALS
Status: DISCONTINUED | OUTPATIENT
Start: 2023-06-04 | End: 2023-06-05

## 2023-06-03 RX ORDER — SODIUM CHLORIDE 0.9 % (FLUSH) 0.9 %
10 SYRINGE (ML) INJECTION PRN
Status: DISCONTINUED | OUTPATIENT
Start: 2023-06-03 | End: 2023-06-08 | Stop reason: HOSPADM

## 2023-06-03 RX ORDER — DILTIAZEM HYDROCHLORIDE 120 MG/1
120 CAPSULE, COATED, EXTENDED RELEASE ORAL DAILY
Status: DISCONTINUED | OUTPATIENT
Start: 2023-06-04 | End: 2023-06-05

## 2023-06-03 RX ADMIN — SODIUM CHLORIDE 80 ML: 9 INJECTION, SOLUTION INTRAVENOUS at 18:25

## 2023-06-03 RX ADMIN — FUROSEMIDE 20 MG: 10 INJECTION, SOLUTION INTRAMUSCULAR; INTRAVENOUS at 20:30

## 2023-06-03 RX ADMIN — SODIUM CHLORIDE, PRESERVATIVE FREE 10 ML: 5 INJECTION INTRAVENOUS at 18:26

## 2023-06-03 RX ADMIN — IOPAMIDOL 75 ML: 755 INJECTION, SOLUTION INTRAVENOUS at 18:25

## 2023-06-03 RX ADMIN — SODIUM CHLORIDE, PRESERVATIVE FREE 10 ML: 5 INJECTION INTRAVENOUS at 23:43

## 2023-06-03 RX ADMIN — APIXABAN 5 MG: 5 TABLET, FILM COATED ORAL at 23:37

## 2023-06-03 RX ADMIN — BUMETANIDE 1 MG: 0.25 INJECTION INTRAMUSCULAR; INTRAVENOUS at 23:37

## 2023-06-03 ASSESSMENT — ENCOUNTER SYMPTOMS
DIARRHEA: 0
SINUS PRESSURE: 0
ABDOMINAL PAIN: 0
COLOR CHANGE: 0
PHOTOPHOBIA: 0
SINUS PAIN: 0
CONSTIPATION: 0
NAUSEA: 0
WHEEZING: 0
EYE REDNESS: 0
SHORTNESS OF BREATH: 1
COUGH: 1
VOMITING: 0
FACIAL SWELLING: 0
EYE DISCHARGE: 0

## 2023-06-04 ENCOUNTER — APPOINTMENT (OUTPATIENT)
Dept: GENERAL RADIOLOGY | Age: 88
End: 2023-06-04
Payer: MEDICARE

## 2023-06-04 LAB
ANION GAP SERPL CALCULATED.3IONS-SCNC: 11 MMOL/L (ref 9–17)
BASOPHILS # BLD: 0.04 K/UL (ref 0–0.2)
BASOPHILS NFR BLD: 1 % (ref 0–2)
BNP SERPL-MCNC: 6081 PG/ML
BUN SERPL-MCNC: 19 MG/DL (ref 8–23)
BUN/CREAT SERPL: 19 (ref 9–20)
CALCIUM SERPL-MCNC: 8.6 MG/DL (ref 8.6–10.4)
CHLORIDE SERPL-SCNC: 88 MMOL/L (ref 98–107)
CHOLEST SERPL-MCNC: 126 MG/DL
CHOLESTEROL/HDL RATIO: 1.8
CO2 SERPL-SCNC: 31 MMOL/L (ref 20–31)
CREAT SERPL-MCNC: 0.99 MG/DL (ref 0.5–0.9)
EOSINOPHIL # BLD: 0.06 K/UL (ref 0–0.44)
EOSINOPHILS RELATIVE PERCENT: 1 % (ref 1–4)
ERYTHROCYTE [DISTWIDTH] IN BLOOD BY AUTOMATED COUNT: 17.1 % (ref 11.8–14.4)
GFR SERPL CREATININE-BSD FRML MDRD: 56 ML/MIN/1.73M2
GLUCOSE SERPL-MCNC: 94 MG/DL (ref 70–99)
HCT VFR BLD AUTO: 37.4 % (ref 36.3–47.1)
HDLC SERPL-MCNC: 69 MG/DL
HGB BLD-MCNC: 11.5 G/DL (ref 11.9–15.1)
IMM GRANULOCYTES # BLD AUTO: 0.02 K/UL (ref 0–0.3)
IMM GRANULOCYTES NFR BLD: 0 %
IRON SATN MFR SERPL: 8 % (ref 20–55)
IRON SERPL-MCNC: 28 UG/DL (ref 37–145)
LDLC SERPL CALC-MCNC: 45 MG/DL (ref 0–130)
LYMPHOCYTES # BLD: 26 % (ref 24–43)
LYMPHOCYTES NFR BLD: 1.34 K/UL (ref 1.1–3.7)
MAGNESIUM SERPL-MCNC: 1.5 MG/DL (ref 1.6–2.6)
MCH RBC QN AUTO: 29.6 PG (ref 25.2–33.5)
MCHC RBC AUTO-ENTMCNC: 30.7 G/DL (ref 28.4–34.8)
MCV RBC AUTO: 96.1 FL (ref 82.6–102.9)
MONOCYTES NFR BLD: 0.65 K/UL (ref 0.1–1.2)
MONOCYTES NFR BLD: 13 % (ref 3–12)
NEUTROPHILS NFR BLD: 59 % (ref 36–65)
NEUTS SEG NFR BLD: 3.08 K/UL (ref 1.5–8.1)
NRBC AUTOMATED: 0 PER 100 WBC
PLATELET # BLD AUTO: 254 K/UL (ref 138–453)
PMV BLD AUTO: 9.2 FL (ref 8.1–13.5)
POTASSIUM SERPL-SCNC: 3.7 MMOL/L (ref 3.7–5.3)
RBC # BLD AUTO: 3.89 M/UL (ref 3.95–5.11)
RBC # BLD: ABNORMAL 10*6/UL
SODIUM SERPL-SCNC: 127 MMOL/L (ref 135–144)
SODIUM SERPL-SCNC: 130 MMOL/L (ref 135–144)
T3FREE SERPL-MCNC: 1.95 PG/ML (ref 2.02–4.43)
T4 FREE SERPL-MCNC: 1.1 NG/DL (ref 0.9–1.7)
TIBC SERPL-MCNC: 342 UG/DL (ref 250–450)
TRIGL SERPL-MCNC: 59 MG/DL
TSH SERPL-MCNC: 11.11 UIU/ML (ref 0.3–5)
UNSATURATED IRON BINDING CAPACITY: 314 UG/DL (ref 112–347)
WBC OTHER # BLD: 5.2 K/UL (ref 3.5–11.3)

## 2023-06-04 PROCEDURE — 84295 ASSAY OF SERUM SODIUM: CPT

## 2023-06-04 PROCEDURE — 83735 ASSAY OF MAGNESIUM: CPT

## 2023-06-04 PROCEDURE — 80048 BASIC METABOLIC PNL TOTAL CA: CPT

## 2023-06-04 PROCEDURE — 99233 SBSQ HOSP IP/OBS HIGH 50: CPT | Performed by: NURSE PRACTITIONER

## 2023-06-04 PROCEDURE — 36415 COLL VENOUS BLD VENIPUNCTURE: CPT

## 2023-06-04 PROCEDURE — 80061 LIPID PANEL: CPT

## 2023-06-04 PROCEDURE — 6360000002 HC RX W HCPCS: Performed by: NURSE PRACTITIONER

## 2023-06-04 PROCEDURE — 83540 ASSAY OF IRON: CPT

## 2023-06-04 PROCEDURE — 97530 THERAPEUTIC ACTIVITIES: CPT

## 2023-06-04 PROCEDURE — 83880 ASSAY OF NATRIURETIC PEPTIDE: CPT

## 2023-06-04 PROCEDURE — 97116 GAIT TRAINING THERAPY: CPT

## 2023-06-04 PROCEDURE — 83550 IRON BINDING TEST: CPT

## 2023-06-04 PROCEDURE — 2500000003 HC RX 250 WO HCPCS: Performed by: NURSE PRACTITIONER

## 2023-06-04 PROCEDURE — 84443 ASSAY THYROID STIM HORMONE: CPT

## 2023-06-04 PROCEDURE — 85027 COMPLETE CBC AUTOMATED: CPT

## 2023-06-04 PROCEDURE — 71046 X-RAY EXAM CHEST 2 VIEWS: CPT

## 2023-06-04 PROCEDURE — 97162 PT EVAL MOD COMPLEX 30 MIN: CPT

## 2023-06-04 PROCEDURE — 2060000000 HC ICU INTERMEDIATE R&B

## 2023-06-04 PROCEDURE — 6370000000 HC RX 637 (ALT 250 FOR IP): Performed by: NURSE PRACTITIONER

## 2023-06-04 PROCEDURE — 84439 ASSAY OF FREE THYROXINE: CPT

## 2023-06-04 PROCEDURE — 2580000003 HC RX 258: Performed by: NURSE PRACTITIONER

## 2023-06-04 PROCEDURE — 84481 FREE ASSAY (FT-3): CPT

## 2023-06-04 RX ORDER — LATANOPROST 50 UG/ML
1 SOLUTION/ DROPS OPHTHALMIC NIGHTLY
Status: DISCONTINUED | OUTPATIENT
Start: 2023-06-04 | End: 2023-06-08 | Stop reason: HOSPADM

## 2023-06-04 RX ORDER — MAGNESIUM SULFATE 1 G/100ML
1000 INJECTION INTRAVENOUS ONCE
Status: COMPLETED | OUTPATIENT
Start: 2023-06-04 | End: 2023-06-04

## 2023-06-04 RX ORDER — LEVOTHYROXINE SODIUM 0.1 MG/1
100 TABLET ORAL DAILY
Status: DISCONTINUED | OUTPATIENT
Start: 2023-06-04 | End: 2023-06-06

## 2023-06-04 RX ORDER — MIDODRINE HYDROCHLORIDE 5 MG/1
5 TABLET ORAL 3 TIMES DAILY PRN
Status: DISCONTINUED | OUTPATIENT
Start: 2023-06-04 | End: 2023-06-06

## 2023-06-04 RX ORDER — MIDODRINE HYDROCHLORIDE 2.5 MG/1
2.5 TABLET ORAL
Status: DISCONTINUED | OUTPATIENT
Start: 2023-06-04 | End: 2023-06-06

## 2023-06-04 RX ADMIN — APIXABAN 5 MG: 5 TABLET, FILM COATED ORAL at 09:15

## 2023-06-04 RX ADMIN — Medication 1 G: at 14:03

## 2023-06-04 RX ADMIN — SODIUM CHLORIDE, PRESERVATIVE FREE 10 ML: 5 INJECTION INTRAVENOUS at 21:30

## 2023-06-04 RX ADMIN — MIDODRINE HYDROCHLORIDE 5 MG: 5 TABLET ORAL at 14:50

## 2023-06-04 RX ADMIN — BUMETANIDE 1 MG: 0.25 INJECTION INTRAMUSCULAR; INTRAVENOUS at 09:15

## 2023-06-04 RX ADMIN — SODIUM CHLORIDE, PRESERVATIVE FREE 10 ML: 5 INJECTION INTRAVENOUS at 09:15

## 2023-06-04 RX ADMIN — SODIUM CHLORIDE 10 ML/HR: 9 INJECTION, SOLUTION INTRAVENOUS at 09:11

## 2023-06-04 RX ADMIN — MAGNESIUM SULFATE HEPTAHYDRATE 1000 MG: 1 INJECTION, SOLUTION INTRAVENOUS at 09:13

## 2023-06-04 RX ADMIN — IRON SUCROSE 300 MG: 20 INJECTION, SOLUTION INTRAVENOUS at 15:13

## 2023-06-04 RX ADMIN — LOSARTAN POTASSIUM 25 MG: 25 TABLET, FILM COATED ORAL at 09:15

## 2023-06-04 RX ADMIN — Medication 1 G: at 18:13

## 2023-06-04 RX ADMIN — PANTOPRAZOLE SODIUM 40 MG: 40 TABLET, DELAYED RELEASE ORAL at 06:00

## 2023-06-04 RX ADMIN — CARVEDILOL 3.12 MG: 3.12 TABLET, FILM COATED ORAL at 09:15

## 2023-06-04 RX ADMIN — Medication 1 G: at 09:15

## 2023-06-04 RX ADMIN — LEVOTHYROXINE SODIUM 100 MCG: 100 TABLET ORAL at 14:03

## 2023-06-04 RX ADMIN — MIDODRINE HYDROCHLORIDE 2.5 MG: 2.5 TABLET ORAL at 21:33

## 2023-06-04 RX ADMIN — MIDODRINE HYDROCHLORIDE 5 MG: 5 TABLET ORAL at 18:13

## 2023-06-04 RX ADMIN — SODIUM CHLORIDE 10 ML/HR: 9 INJECTION, SOLUTION INTRAVENOUS at 09:08

## 2023-06-04 RX ADMIN — APIXABAN 5 MG: 5 TABLET, FILM COATED ORAL at 21:31

## 2023-06-04 RX ADMIN — DILTIAZEM HYDROCHLORIDE 120 MG: 120 CAPSULE, COATED, EXTENDED RELEASE ORAL at 09:15

## 2023-06-05 ENCOUNTER — APPOINTMENT (OUTPATIENT)
Dept: GENERAL RADIOLOGY | Age: 88
End: 2023-06-05
Payer: MEDICARE

## 2023-06-05 ENCOUNTER — HOSPITAL ENCOUNTER (OUTPATIENT)
Dept: WOUND CARE | Age: 88
Discharge: HOME | End: 2023-06-05

## 2023-06-05 LAB
ANION GAP SERPL CALCULATED.3IONS-SCNC: 9 MMOL/L (ref 9–17)
BUN SERPL-MCNC: 25 MG/DL (ref 8–23)
BUN/CREAT SERPL: 21 (ref 9–20)
CALCIUM SERPL-MCNC: 8.9 MG/DL (ref 8.6–10.4)
CHLORIDE SERPL-SCNC: 91 MMOL/L (ref 98–107)
CO2 SERPL-SCNC: 30 MMOL/L (ref 20–31)
CREAT SERPL-MCNC: 1.2 MG/DL (ref 0.5–0.9)
EKG ATRIAL RATE: 71 BPM
EKG Q-T INTERVAL: 424 MS
EKG QRS DURATION: 84 MS
EKG QTC CALCULATION (BAZETT): 444 MS
EKG R AXIS: -19 DEGREES
EKG T AXIS: 67 DEGREES
EKG VENTRICULAR RATE: 66 BPM
GFR SERPL CREATININE-BSD FRML MDRD: 44 ML/MIN/1.73M2
GLUCOSE SERPL-MCNC: 105 MG/DL (ref 70–99)
LV EF: 38 %
LVEF MODALITY: NORMAL
MAGNESIUM SERPL-MCNC: 1.8 MG/DL (ref 1.6–2.6)
POTASSIUM SERPL-SCNC: 4 MMOL/L (ref 3.7–5.3)
SODIUM SERPL-SCNC: 130 MMOL/L (ref 135–144)

## 2023-06-05 PROCEDURE — 71045 X-RAY EXAM CHEST 1 VIEW: CPT

## 2023-06-05 PROCEDURE — 83735 ASSAY OF MAGNESIUM: CPT

## 2023-06-05 PROCEDURE — C8929 TTE W OR WO FOL WCON,DOPPLER: HCPCS

## 2023-06-05 PROCEDURE — 99232 SBSQ HOSP IP/OBS MODERATE 35: CPT | Performed by: NURSE PRACTITIONER

## 2023-06-05 PROCEDURE — 6370000000 HC RX 637 (ALT 250 FOR IP): Performed by: NURSE PRACTITIONER

## 2023-06-05 PROCEDURE — 36415 COLL VENOUS BLD VENIPUNCTURE: CPT

## 2023-06-05 PROCEDURE — 2060000000 HC ICU INTERMEDIATE R&B

## 2023-06-05 PROCEDURE — 6360000002 HC RX W HCPCS: Performed by: NURSE PRACTITIONER

## 2023-06-05 PROCEDURE — 2580000003 HC RX 258: Performed by: NURSE PRACTITIONER

## 2023-06-05 PROCEDURE — 2500000003 HC RX 250 WO HCPCS: Performed by: NURSE PRACTITIONER

## 2023-06-05 PROCEDURE — 80048 BASIC METABOLIC PNL TOTAL CA: CPT

## 2023-06-05 RX ORDER — LOSARTAN POTASSIUM 25 MG/1
25 TABLET ORAL DAILY
Status: ON HOLD | COMMUNITY
End: 2023-06-08 | Stop reason: HOSPADM

## 2023-06-05 RX ORDER — METOPROLOL SUCCINATE 50 MG/1
50 TABLET, EXTENDED RELEASE ORAL DAILY
Status: DISCONTINUED | OUTPATIENT
Start: 2023-06-06 | End: 2023-06-08 | Stop reason: HOSPADM

## 2023-06-05 RX ORDER — BUMETANIDE 0.25 MG/ML
2 INJECTION INTRAMUSCULAR; INTRAVENOUS 2 TIMES DAILY
Status: DISCONTINUED | OUTPATIENT
Start: 2023-06-05 | End: 2023-06-08

## 2023-06-05 RX ADMIN — LATANOPROST 1 DROP: 50 SOLUTION OPHTHALMIC at 00:23

## 2023-06-05 RX ADMIN — Medication 1 G: at 18:11

## 2023-06-05 RX ADMIN — IRON SUCROSE 300 MG: 20 INJECTION, SOLUTION INTRAVENOUS at 18:08

## 2023-06-05 RX ADMIN — SODIUM CHLORIDE, PRESERVATIVE FREE 10 ML: 5 INJECTION INTRAVENOUS at 10:38

## 2023-06-05 RX ADMIN — APIXABAN 5 MG: 5 TABLET, FILM COATED ORAL at 10:26

## 2023-06-05 RX ADMIN — LATANOPROST 1 DROP: 50 SOLUTION OPHTHALMIC at 20:58

## 2023-06-05 RX ADMIN — DILTIAZEM HYDROCHLORIDE 120 MG: 120 CAPSULE, COATED, EXTENDED RELEASE ORAL at 10:27

## 2023-06-05 RX ADMIN — Medication 1 G: at 10:26

## 2023-06-05 RX ADMIN — Medication 1 G: at 12:19

## 2023-06-05 RX ADMIN — APIXABAN 5 MG: 5 TABLET, FILM COATED ORAL at 20:58

## 2023-06-05 RX ADMIN — PERFLUTREN 1.5 ML: 6.52 INJECTION, SUSPENSION INTRAVENOUS at 10:40

## 2023-06-05 RX ADMIN — MIDODRINE HYDROCHLORIDE 2.5 MG: 2.5 TABLET ORAL at 10:26

## 2023-06-05 RX ADMIN — BUMETANIDE 2 MG: 0.25 INJECTION INTRAMUSCULAR; INTRAVENOUS at 18:11

## 2023-06-05 RX ADMIN — SODIUM CHLORIDE 10 ML/HR: 9 INJECTION, SOLUTION INTRAVENOUS at 18:08

## 2023-06-05 RX ADMIN — PANTOPRAZOLE SODIUM 40 MG: 40 TABLET, DELAYED RELEASE ORAL at 06:20

## 2023-06-05 RX ADMIN — LEVOTHYROXINE SODIUM 100 MCG: 100 TABLET ORAL at 06:20

## 2023-06-05 NOTE — PLAN OF CARE
Problem: Discharge Planning  Goal: Discharge to home or other facility with appropriate resources  6/5/2023 0231 by Korina Diaz RN  Outcome: Progressing  Flowsheets (Taken 6/4/2023 2000)  Discharge to home or other facility with appropriate resources:   Identify barriers to discharge with patient and caregiver   Arrange for needed discharge resources and transportation as appropriate   Identify discharge learning needs (meds, wound care, etc)       Problem: Safety - Adult  Goal: Free from fall injury  6/5/2023 0231 by Korina Diaz RN  Outcome: Progressing       Problem: ABCDS Injury Assessment  Goal: Absence of physical injury  6/5/2023 0231 by Korina Diaz RN  Outcome: Progressing

## 2023-06-05 NOTE — PLAN OF CARE
Pt resting comfortably today. Strick I&O's, and daily weights. IV venofer given as well as IV bumex. Dressing changed the the left shin. Bed is locked and in the lowest position with call light in reach. Will continue to provide support and education as needed.      Problem: Discharge Planning  Goal: Discharge to home or other facility with appropriate resources  Outcome: Progressing     Problem: Safety - Adult  Goal: Free from fall injury  Outcome: Progressing     Problem: ABCDS Injury Assessment  Goal: Absence of physical injury  Outcome: Progressing

## 2023-06-05 NOTE — CONSULTS
BILITOT, BILIDIR, IBILI, PROT, GLOB, ALBUMIN in the last 72 hours. 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 Age indeterminate elevation of the right hemidiaphragm. Deformity of the bilateral shoulders. Age-indeterminate bone fragments at  the glenoid on the left, most likely chronic; however, correlate with  dedicated shoulder imaging if needed. Given the symmetric appearance,  neuropathic joints are a consideration. Chest x-ray 2023  IMPRESSION:  No significant change from prior exam.  Trace right pleural effusion and  minimal right basilar atelectasis    Chest CTA 2023  IMPRESSION:  No evidence of pulmonary embolism. Moderate CHF pattern pulmonary vascular congestion with trace bilateral  pleural effusions. Ectasia of the ascending thoracic aorta. Moderate hiatal hernia. ECHO: Date: 3/1/2023 in [facility] cardiology office  Mild concentric left ventricle hypertrophy  Moderately reduced left ventricle systolic function, ejection fraction 40%  Akinetic apex and apical anterior segment  Severe left atrial enlargement  Moderate to severe mitral rotation  Mild to moderate aortic insufficiency  Moderate to severe tricuspid rotation  Moderate pulmonary hypertension, RVSP 55 mmHg    Stress Test: Lexiscan nuclear stress test 3/1/2023 in 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   No ischemia  Ejection fraction 40% with apical akinesis    Angiography: Not on records      IMPRESSION:    Acute on chronic systolic congestive heart failure, responding well to diuresis with Bumex

## 2023-06-06 PROBLEM — I50.23 ACUTE ON CHRONIC SYSTOLIC HEART FAILURE (HCC): Status: ACTIVE | Noted: 2023-06-03

## 2023-06-06 PROBLEM — I48.11 LONGSTANDING PERSISTENT ATRIAL FIBRILLATION (HCC): Status: ACTIVE | Noted: 2023-06-03

## 2023-06-06 LAB
ANION GAP SERPL CALCULATED.3IONS-SCNC: 11 MMOL/L (ref 9–17)
BNP SERPL-MCNC: 5787 PG/ML
BUN SERPL-MCNC: 20 MG/DL (ref 8–23)
BUN/CREAT SERPL: 19 (ref 9–20)
CALCIUM SERPL-MCNC: 8.8 MG/DL (ref 8.6–10.4)
CHLORIDE SERPL-SCNC: 93 MMOL/L (ref 98–107)
CO2 SERPL-SCNC: 29 MMOL/L (ref 20–31)
CREAT SERPL-MCNC: 1.06 MG/DL (ref 0.5–0.9)
GFR SERPL CREATININE-BSD FRML MDRD: 51 ML/MIN/1.73M2
GLUCOSE SERPL-MCNC: 104 MG/DL (ref 70–99)
MAGNESIUM SERPL-MCNC: 1.6 MG/DL (ref 1.6–2.6)
POTASSIUM SERPL-SCNC: 3.9 MMOL/L (ref 3.7–5.3)
SODIUM SERPL-SCNC: 133 MMOL/L (ref 135–144)

## 2023-06-06 PROCEDURE — 2500000003 HC RX 250 WO HCPCS: Performed by: NURSE PRACTITIONER

## 2023-06-06 PROCEDURE — 6370000000 HC RX 637 (ALT 250 FOR IP): Performed by: INTERNAL MEDICINE

## 2023-06-06 PROCEDURE — 97110 THERAPEUTIC EXERCISES: CPT

## 2023-06-06 PROCEDURE — 6360000002 HC RX W HCPCS: Performed by: NURSE PRACTITIONER

## 2023-06-06 PROCEDURE — 2060000000 HC ICU INTERMEDIATE R&B

## 2023-06-06 PROCEDURE — 97530 THERAPEUTIC ACTIVITIES: CPT

## 2023-06-06 PROCEDURE — 83880 ASSAY OF NATRIURETIC PEPTIDE: CPT

## 2023-06-06 PROCEDURE — 2580000003 HC RX 258: Performed by: NURSE PRACTITIONER

## 2023-06-06 PROCEDURE — 83735 ASSAY OF MAGNESIUM: CPT

## 2023-06-06 PROCEDURE — 36415 COLL VENOUS BLD VENIPUNCTURE: CPT

## 2023-06-06 PROCEDURE — 99232 SBSQ HOSP IP/OBS MODERATE 35: CPT | Performed by: INTERNAL MEDICINE

## 2023-06-06 PROCEDURE — 6370000000 HC RX 637 (ALT 250 FOR IP): Performed by: NURSE PRACTITIONER

## 2023-06-06 PROCEDURE — 80048 BASIC METABOLIC PNL TOTAL CA: CPT

## 2023-06-06 RX ORDER — LEVOTHYROXINE SODIUM 0.05 MG/1
50 TABLET ORAL DAILY
Status: DISCONTINUED | OUTPATIENT
Start: 2023-06-07 | End: 2023-06-08 | Stop reason: HOSPADM

## 2023-06-06 RX ORDER — M-VIT,TX,IRON,MINS/CALC/FOLIC 27MG-0.4MG
1 TABLET ORAL DAILY
Status: DISCONTINUED | OUTPATIENT
Start: 2023-06-06 | End: 2023-06-08 | Stop reason: HOSPADM

## 2023-06-06 RX ORDER — ATORVASTATIN CALCIUM 80 MG/1
80 TABLET, FILM COATED ORAL NIGHTLY
Status: DISCONTINUED | OUTPATIENT
Start: 2023-06-06 | End: 2023-06-08 | Stop reason: HOSPADM

## 2023-06-06 RX ORDER — NITROGLYCERIN 0.4 MG/1
0.4 TABLET SUBLINGUAL EVERY 5 MIN PRN
Status: DISCONTINUED | OUTPATIENT
Start: 2023-06-06 | End: 2023-06-08 | Stop reason: HOSPADM

## 2023-06-06 RX ORDER — BRIMONIDINE TARTRATE 2 MG/ML
1 SOLUTION/ DROPS OPHTHALMIC 2 TIMES DAILY
Status: DISCONTINUED | OUTPATIENT
Start: 2023-06-06 | End: 2023-06-08 | Stop reason: HOSPADM

## 2023-06-06 RX ORDER — POTASSIUM CHLORIDE 20 MEQ/1
20 TABLET, EXTENDED RELEASE ORAL DAILY
Status: DISCONTINUED | OUTPATIENT
Start: 2023-06-06 | End: 2023-06-08 | Stop reason: HOSPADM

## 2023-06-06 RX ORDER — ASPIRIN 81 MG/1
81 TABLET ORAL DAILY
Status: DISCONTINUED | OUTPATIENT
Start: 2023-06-06 | End: 2023-06-08 | Stop reason: HOSPADM

## 2023-06-06 RX ORDER — GUAIFENESIN 200 MG/10ML
200 LIQUID ORAL EVERY 4 HOURS PRN
Status: DISCONTINUED | OUTPATIENT
Start: 2023-06-06 | End: 2023-06-08 | Stop reason: HOSPADM

## 2023-06-06 RX ORDER — TRAZODONE HYDROCHLORIDE 50 MG/1
50 TABLET ORAL NIGHTLY
Status: DISCONTINUED | OUTPATIENT
Start: 2023-06-06 | End: 2023-06-08 | Stop reason: HOSPADM

## 2023-06-06 RX ORDER — LANOLIN ALCOHOL/MO/W.PET/CERES
200 CREAM (GRAM) TOPICAL DAILY
Status: DISCONTINUED | OUTPATIENT
Start: 2023-06-06 | End: 2023-06-08 | Stop reason: HOSPADM

## 2023-06-06 RX ADMIN — APIXABAN 5 MG: 5 TABLET, FILM COATED ORAL at 22:07

## 2023-06-06 RX ADMIN — BUMETANIDE 2 MG: 0.25 INJECTION INTRAMUSCULAR; INTRAVENOUS at 18:25

## 2023-06-06 RX ADMIN — LATANOPROST 1 DROP: 50 SOLUTION OPHTHALMIC at 22:07

## 2023-06-06 RX ADMIN — ASPIRIN 81 MG: 81 TABLET, COATED ORAL at 11:41

## 2023-06-06 RX ADMIN — METOPROLOL SUCCINATE 50 MG: 50 TABLET, EXTENDED RELEASE ORAL at 08:13

## 2023-06-06 RX ADMIN — SODIUM CHLORIDE 10 ML/HR: 9 INJECTION, SOLUTION INTRAVENOUS at 15:28

## 2023-06-06 RX ADMIN — SODIUM CHLORIDE, PRESERVATIVE FREE 10 ML: 5 INJECTION INTRAVENOUS at 08:13

## 2023-06-06 RX ADMIN — LOSARTAN POTASSIUM 25 MG: 25 TABLET, FILM COATED ORAL at 11:41

## 2023-06-06 RX ADMIN — Medication 200 MG: at 11:39

## 2023-06-06 RX ADMIN — MULTIPLE VITAMINS W/ MINERALS TAB 1 TABLET: TAB at 11:35

## 2023-06-06 RX ADMIN — GUAIFENESIN 200 MG: 200 SOLUTION ORAL at 22:07

## 2023-06-06 RX ADMIN — BUMETANIDE 2 MG: 0.25 INJECTION INTRAMUSCULAR; INTRAVENOUS at 08:13

## 2023-06-06 RX ADMIN — IRON SUCROSE 300 MG: 20 INJECTION, SOLUTION INTRAVENOUS at 15:29

## 2023-06-06 RX ADMIN — LEVOTHYROXINE SODIUM 100 MCG: 100 TABLET ORAL at 06:07

## 2023-06-06 RX ADMIN — Medication 1 G: at 08:13

## 2023-06-06 RX ADMIN — BRIMONIDINE TARTRATE 1 DROP: 2 SOLUTION OPHTHALMIC at 18:25

## 2023-06-06 RX ADMIN — BRIMONIDINE TARTRATE 1 DROP: 2 SOLUTION OPHTHALMIC at 10:16

## 2023-06-06 RX ADMIN — APIXABAN 5 MG: 5 TABLET, FILM COATED ORAL at 08:13

## 2023-06-06 RX ADMIN — ATORVASTATIN CALCIUM 80 MG: 80 TABLET, FILM COATED ORAL at 22:07

## 2023-06-06 RX ADMIN — POTASSIUM CHLORIDE 20 MEQ: 1500 TABLET, EXTENDED RELEASE ORAL at 11:38

## 2023-06-06 RX ADMIN — SODIUM CHLORIDE, PRESERVATIVE FREE 10 ML: 5 INJECTION INTRAVENOUS at 22:08

## 2023-06-06 RX ADMIN — PANTOPRAZOLE SODIUM 40 MG: 40 TABLET, DELAYED RELEASE ORAL at 06:07

## 2023-06-06 NOTE — PLAN OF CARE
Dressing changed, pt is receiving IV bumex. IV venofer given. Daily weight and stickl I&O's per cardiology. Safety maintained. All questions and concerns addressed.     Problem: Discharge Planning  Goal: Discharge to home or other facility with appropriate resources  Outcome: Progressing     Problem: Safety - Adult  Goal: Free from fall injury  Outcome: Progressing     Problem: ABCDS Injury Assessment  Goal: Absence of physical injury  Outcome: Progressing

## 2023-06-06 NOTE — PLAN OF CARE
Problem: Discharge Planning  Goal: Discharge to home or other facility with appropriate resources  6/5/2023 2229 by Beth Lake RN  Outcome: Progressing  Note: Discharge teaching and instructions for diagnosis/procedure explained with patient using teachback method. Patient voiced understanding regarding prescriptions, follow up appointments, and care of self at home. 6/5/2023 1947 by Rhoda Lau RN  Outcome: Progressing     Problem: Safety - Adult  Goal: Free from fall injury  6/5/2023 2229 by Beth Lake RN  Outcome: Progressing  Note: Pt fall risk, fall band present, falling star, safety alarm activated and in use as needed. Hourly rounding performed. Pt encouraged to use call light. See Amador Holbrook fall risk assessment. 6/5/2023 1947 by Rhoda Lau RN  Outcome: Progressing     Problem: ABCDS Injury Assessment  Goal: Absence of physical injury  6/5/2023 2229 by Beth Lake RN  Outcome: Progressing  Note: Non-skid socks in place, up with assistance, bed in lowest position, bed exit & alarm as needed, provide toileting every 2 hours an d as needed.    6/5/2023 1947 by Rhoda Lau RN  Outcome: Progressing

## 2023-06-07 ENCOUNTER — APPOINTMENT (OUTPATIENT)
Dept: GENERAL RADIOLOGY | Age: 88
End: 2023-06-07
Payer: MEDICARE

## 2023-06-07 LAB
ANION GAP SERPL CALCULATED.3IONS-SCNC: 8 MMOL/L (ref 9–17)
BUN SERPL-MCNC: 17 MG/DL (ref 8–23)
BUN/CREAT SERPL: 20 (ref 9–20)
CALCIUM SERPL-MCNC: 9 MG/DL (ref 8.6–10.4)
CHLORIDE SERPL-SCNC: 89 MMOL/L (ref 98–107)
CO2 SERPL-SCNC: 34 MMOL/L (ref 20–31)
CREAT SERPL-MCNC: 0.86 MG/DL (ref 0.5–0.9)
GFR SERPL CREATININE-BSD FRML MDRD: >60 ML/MIN/1.73M2
GLUCOSE SERPL-MCNC: 111 MG/DL (ref 70–99)
MAGNESIUM SERPL-MCNC: 1.4 MG/DL (ref 1.6–2.6)
POTASSIUM SERPL-SCNC: 4 MMOL/L (ref 3.7–5.3)
SODIUM SERPL-SCNC: 131 MMOL/L (ref 135–144)

## 2023-06-07 PROCEDURE — 36415 COLL VENOUS BLD VENIPUNCTURE: CPT

## 2023-06-07 PROCEDURE — 2500000003 HC RX 250 WO HCPCS: Performed by: NURSE PRACTITIONER

## 2023-06-07 PROCEDURE — 6370000000 HC RX 637 (ALT 250 FOR IP): Performed by: NURSE PRACTITIONER

## 2023-06-07 PROCEDURE — 6370000000 HC RX 637 (ALT 250 FOR IP): Performed by: INTERNAL MEDICINE

## 2023-06-07 PROCEDURE — 99232 SBSQ HOSP IP/OBS MODERATE 35: CPT | Performed by: INTERNAL MEDICINE

## 2023-06-07 PROCEDURE — 2580000003 HC RX 258: Performed by: NURSE PRACTITIONER

## 2023-06-07 PROCEDURE — 80048 BASIC METABOLIC PNL TOTAL CA: CPT

## 2023-06-07 PROCEDURE — 99213 OFFICE O/P EST LOW 20 MIN: CPT

## 2023-06-07 PROCEDURE — 71046 X-RAY EXAM CHEST 2 VIEWS: CPT

## 2023-06-07 PROCEDURE — 83735 ASSAY OF MAGNESIUM: CPT

## 2023-06-07 PROCEDURE — 2060000000 HC ICU INTERMEDIATE R&B

## 2023-06-07 PROCEDURE — 6360000002 HC RX W HCPCS: Performed by: INTERNAL MEDICINE

## 2023-06-07 RX ORDER — MAGNESIUM SULFATE IN WATER 40 MG/ML
2000 INJECTION, SOLUTION INTRAVENOUS ONCE
Status: COMPLETED | OUTPATIENT
Start: 2023-06-07 | End: 2023-06-07

## 2023-06-07 RX ORDER — LOSARTAN POTASSIUM 50 MG/1
50 TABLET ORAL DAILY
Status: DISCONTINUED | OUTPATIENT
Start: 2023-06-08 | End: 2023-06-08 | Stop reason: HOSPADM

## 2023-06-07 RX ORDER — MAGNESIUM SULFATE 1 G/100ML
1000 INJECTION INTRAVENOUS ONCE
Status: COMPLETED | OUTPATIENT
Start: 2023-06-07 | End: 2023-06-07

## 2023-06-07 RX ADMIN — SODIUM CHLORIDE, PRESERVATIVE FREE 10 ML: 5 INJECTION INTRAVENOUS at 17:50

## 2023-06-07 RX ADMIN — SODIUM CHLORIDE, PRESERVATIVE FREE 10 ML: 5 INJECTION INTRAVENOUS at 21:35

## 2023-06-07 RX ADMIN — ASPIRIN 81 MG: 81 TABLET, COATED ORAL at 08:10

## 2023-06-07 RX ADMIN — PANTOPRAZOLE SODIUM 40 MG: 40 TABLET, DELAYED RELEASE ORAL at 05:55

## 2023-06-07 RX ADMIN — GUAIFENESIN 200 MG: 200 SOLUTION ORAL at 23:16

## 2023-06-07 RX ADMIN — BUMETANIDE 2 MG: 0.25 INJECTION INTRAMUSCULAR; INTRAVENOUS at 08:10

## 2023-06-07 RX ADMIN — APIXABAN 5 MG: 5 TABLET, FILM COATED ORAL at 21:35

## 2023-06-07 RX ADMIN — SODIUM CHLORIDE 10 ML/HR: 9 INJECTION, SOLUTION INTRAVENOUS at 12:07

## 2023-06-07 RX ADMIN — GUAIFENESIN 200 MG: 200 SOLUTION ORAL at 05:56

## 2023-06-07 RX ADMIN — Medication 200 MG: at 08:11

## 2023-06-07 RX ADMIN — BRIMONIDINE TARTRATE 1 DROP: 2 SOLUTION OPHTHALMIC at 17:50

## 2023-06-07 RX ADMIN — MAGNESIUM SULFATE HEPTAHYDRATE 2000 MG: 40 INJECTION, SOLUTION INTRAVENOUS at 12:10

## 2023-06-07 RX ADMIN — LOSARTAN POTASSIUM 25 MG: 25 TABLET, FILM COATED ORAL at 08:11

## 2023-06-07 RX ADMIN — POTASSIUM CHLORIDE 20 MEQ: 1500 TABLET, EXTENDED RELEASE ORAL at 08:11

## 2023-06-07 RX ADMIN — BUMETANIDE 2 MG: 0.25 INJECTION INTRAMUSCULAR; INTRAVENOUS at 17:49

## 2023-06-07 RX ADMIN — MULTIPLE VITAMINS W/ MINERALS TAB 1 TABLET: TAB at 08:11

## 2023-06-07 RX ADMIN — LATANOPROST 1 DROP: 50 SOLUTION OPHTHALMIC at 21:41

## 2023-06-07 RX ADMIN — MAGNESIUM SULFATE HEPTAHYDRATE 1000 MG: 1 INJECTION, SOLUTION INTRAVENOUS at 14:15

## 2023-06-07 RX ADMIN — ATORVASTATIN CALCIUM 80 MG: 80 TABLET, FILM COATED ORAL at 21:35

## 2023-06-07 RX ADMIN — METOPROLOL SUCCINATE 50 MG: 50 TABLET, EXTENDED RELEASE ORAL at 08:11

## 2023-06-07 RX ADMIN — GUAIFENESIN 200 MG: 200 SOLUTION ORAL at 13:45

## 2023-06-07 RX ADMIN — LEVOTHYROXINE SODIUM 50 MCG: 0.05 TABLET ORAL at 05:55

## 2023-06-07 RX ADMIN — BRIMONIDINE TARTRATE 1 DROP: 2 SOLUTION OPHTHALMIC at 08:11

## 2023-06-07 RX ADMIN — SODIUM CHLORIDE, PRESERVATIVE FREE 10 ML: 5 INJECTION INTRAVENOUS at 08:17

## 2023-06-07 RX ADMIN — APIXABAN 5 MG: 5 TABLET, FILM COATED ORAL at 08:11

## 2023-06-07 NOTE — CARE COORDINATION
Discharge planning    Patient chart reviewed. Patient lives at home with daughter rosa. Independent using her rw. [facility]. [facility] accepted on discharge. Admitted with CHF> IV diuresing. Will follow.

## 2023-06-07 NOTE — PLAN OF CARE
Pt has been A&O throughout shift and cooperative. Pt has been receiving 2L O2 via Nasal Cannula and sating high 90s throughout the shift. Pt has continued to receive IV Bumex and requested to leave purewick in place after being given Bumex. The pt has continued to have a unproductive cough and was given PRN robitussin for the cough. Pt has used call light appropriately when needing assistance.   Problem: Safety - Adult  Goal: Free from fall injury  6/7/2023 1822 by Fletcher Duke RN  Outcome: Won Navarroa (Taken 6/7/2023 1822)  Free From Fall Injury: Instruct family/caregiver on patient safety    Problem: ABCDS Injury Assessment  Goal: Absence of physical injury  6/7/2023 1822 by Fletcher Duke RN  Outcome: Progressing  Flowsheets (Taken 6/7/2023 1822)  Absence of Physical Injury: Implement safety measures based on patient assessment

## 2023-06-07 NOTE — FLOWSHEET NOTE
06/07/23 0915   Treatment Team Notification   Reason for Communication Review case  (mag level)   Team Member Name Blood   Treatment Team Role Attending Provider   Method of Communication Secure Message   Response See orders   Notification Time 0915     Dr. Carter notified regarding pts morning labs which resulted in a mag of 1.4   Orders received to replace mag via IV.

## 2023-06-07 NOTE — PLAN OF CARE
Pt had a tonia overall, no complaints of pain overnight, pt did have complaints of a cough, NP contacted and PRN robitussin was ordered and given twice during the shift. Pt on 2l nc and sating at 94%. Pt had 900 out overnight through puriwick. Pt had 200 intake for 1500 FR. Pt safety maintained, VSS, all questions asked and answered wcm.   Problem: Discharge Planning  Goal: Discharge to home or other facility with appropriate resources  6/7/2023 0716 by Kelsey Cuadra RN  Outcome: Progressing     Problem: Safety - Adult  Goal: Free from fall injury  6/7/2023 0716 by Kelsey Cuadra RN  Outcome: Progressing     Problem: ABCDS Injury Assessment  Goal: Absence of physical injury  6/7/2023 0716 by Kelsey Cuadra RN  Outcome: Progressing

## 2023-06-08 VITALS
RESPIRATION RATE: 18 BRPM | SYSTOLIC BLOOD PRESSURE: 101 MMHG | WEIGHT: 134.1 LBS | TEMPERATURE: 97.2 F | HEIGHT: 60 IN | OXYGEN SATURATION: 94 % | HEART RATE: 77 BPM | DIASTOLIC BLOOD PRESSURE: 61 MMHG | BODY MASS INDEX: 26.33 KG/M2

## 2023-06-08 LAB
ANION GAP SERPL CALCULATED.3IONS-SCNC: 8 MMOL/L (ref 9–17)
BNP SERPL-MCNC: 7123 PG/ML
BUN SERPL-MCNC: 15 MG/DL (ref 8–23)
BUN/CREAT SERPL: 17 (ref 9–20)
CALCIUM SERPL-MCNC: 9.4 MG/DL (ref 8.6–10.4)
CHLORIDE SERPL-SCNC: 87 MMOL/L (ref 98–107)
CO2 SERPL-SCNC: 34 MMOL/L (ref 20–31)
CREAT SERPL-MCNC: 0.87 MG/DL (ref 0.5–0.9)
GFR SERPL CREATININE-BSD FRML MDRD: >60 ML/MIN/1.73M2
GLUCOSE SERPL-MCNC: 118 MG/DL (ref 70–99)
MAGNESIUM SERPL-MCNC: 1.9 MG/DL (ref 1.6–2.6)
POTASSIUM SERPL-SCNC: 4.4 MMOL/L (ref 3.7–5.3)
SODIUM SERPL-SCNC: 129 MMOL/L (ref 135–144)

## 2023-06-08 PROCEDURE — 97530 THERAPEUTIC ACTIVITIES: CPT

## 2023-06-08 PROCEDURE — 2580000003 HC RX 258: Performed by: NURSE PRACTITIONER

## 2023-06-08 PROCEDURE — 83880 ASSAY OF NATRIURETIC PEPTIDE: CPT

## 2023-06-08 PROCEDURE — 80048 BASIC METABOLIC PNL TOTAL CA: CPT

## 2023-06-08 PROCEDURE — 97116 GAIT TRAINING THERAPY: CPT

## 2023-06-08 PROCEDURE — 6370000000 HC RX 637 (ALT 250 FOR IP): Performed by: NURSE PRACTITIONER

## 2023-06-08 PROCEDURE — 99239 HOSP IP/OBS DSCHRG MGMT >30: CPT | Performed by: INTERNAL MEDICINE

## 2023-06-08 PROCEDURE — 2500000003 HC RX 250 WO HCPCS: Performed by: NURSE PRACTITIONER

## 2023-06-08 PROCEDURE — 6370000000 HC RX 637 (ALT 250 FOR IP): Performed by: INTERNAL MEDICINE

## 2023-06-08 PROCEDURE — 36415 COLL VENOUS BLD VENIPUNCTURE: CPT

## 2023-06-08 PROCEDURE — 83735 ASSAY OF MAGNESIUM: CPT

## 2023-06-08 PROCEDURE — 97110 THERAPEUTIC EXERCISES: CPT

## 2023-06-08 RX ORDER — LOSARTAN POTASSIUM 50 MG/1
50 TABLET ORAL DAILY
Qty: 30 TABLET | Refills: 3 | Status: ON HOLD | OUTPATIENT
Start: 2023-06-09

## 2023-06-08 RX ORDER — METOPROLOL SUCCINATE 50 MG/1
50 TABLET, EXTENDED RELEASE ORAL DAILY
Qty: 30 TABLET | Refills: 3 | Status: ON HOLD | OUTPATIENT
Start: 2023-06-09

## 2023-06-08 RX ORDER — LEVOTHYROXINE SODIUM 0.05 MG/1
50 TABLET ORAL DAILY
Qty: 30 TABLET | Refills: 3 | Status: ON HOLD | OUTPATIENT
Start: 2023-06-09

## 2023-06-08 RX ORDER — BUMETANIDE 1 MG/1
2 TABLET ORAL DAILY
Status: DISCONTINUED | OUTPATIENT
Start: 2023-06-09 | End: 2023-06-08 | Stop reason: HOSPADM

## 2023-06-08 RX ORDER — BUMETANIDE 2 MG/1
2 TABLET ORAL DAILY
Qty: 30 TABLET | Refills: 3 | Status: ON HOLD | OUTPATIENT
Start: 2023-06-09

## 2023-06-08 RX ADMIN — PANTOPRAZOLE SODIUM 40 MG: 40 TABLET, DELAYED RELEASE ORAL at 06:27

## 2023-06-08 RX ADMIN — Medication 200 MG: at 08:11

## 2023-06-08 RX ADMIN — MULTIPLE VITAMINS W/ MINERALS TAB 1 TABLET: TAB at 08:19

## 2023-06-08 RX ADMIN — METOPROLOL SUCCINATE 50 MG: 50 TABLET, EXTENDED RELEASE ORAL at 08:11

## 2023-06-08 RX ADMIN — BRIMONIDINE TARTRATE 1 DROP: 2 SOLUTION OPHTHALMIC at 08:12

## 2023-06-08 RX ADMIN — BUMETANIDE 2 MG: 0.25 INJECTION INTRAMUSCULAR; INTRAVENOUS at 08:12

## 2023-06-08 RX ADMIN — GUAIFENESIN 200 MG: 200 SOLUTION ORAL at 13:07

## 2023-06-08 RX ADMIN — SODIUM CHLORIDE, PRESERVATIVE FREE 10 ML: 5 INJECTION INTRAVENOUS at 08:13

## 2023-06-08 RX ADMIN — ASPIRIN 81 MG: 81 TABLET, COATED ORAL at 08:11

## 2023-06-08 RX ADMIN — LEVOTHYROXINE SODIUM 50 MCG: 0.05 TABLET ORAL at 06:27

## 2023-06-08 RX ADMIN — APIXABAN 5 MG: 5 TABLET, FILM COATED ORAL at 08:12

## 2023-06-08 RX ADMIN — LOSARTAN POTASSIUM 50 MG: 50 TABLET, FILM COATED ORAL at 08:12

## 2023-06-08 RX ADMIN — POTASSIUM CHLORIDE 20 MEQ: 1500 TABLET, EXTENDED RELEASE ORAL at 08:11

## 2023-06-08 NOTE — PROGRESS NOTES
[address]  Occupational Therapy Not Seen    DATE: 2023    NAME: Bridgette Nevarez  MRN: [id number]   : 1935    Patient not seen this date for Occupational Therapy due to:      [] Cancel by RN or physician due to:    [] Hemodialysis    [] Critical Lab Value Level     [] Blood transfusion in progress    [] Acute or unstable cardiovascular status   _MAP < 55 or more than >115  _HR < 40 or > 130    [] Acute or unstable pulmonary status   -FiO2 > 60%   _RR < 5 or >40    _O2 sats < 85%    [] Strict Bedrest    [] Off Unit for surgery or procedure    [] Off Unit for testing       [] Pending imaging to R/O fracture    [] Refusal by Patient      [x] Other: Working with physical therapy. [] OT being discontinued at this time. Patient independent. No further needs. [] OT being discontinued at this time as the patient has been transferred to hospice care. No further needs.       BRIANNE Dietz
[address]  Occupational Therapy Not Seen    DATE: 2023    NAME: John Hna  MRN: [id number]   : 1935    Patient not seen this date for Occupational Therapy due to:      [] Cancel by RN or physician due to:    [] Hemodialysis    [] Critical Lab Value Level     [] Blood transfusion in progress    [] Acute or unstable cardiovascular status   _MAP < 55 or more than >115  _HR < 40 or > 130    [] Acute or unstable pulmonary status   -FiO2 > 60%   _RR < 5 or >40    _O2 sats < 85%    [] Strict Bedrest    [] Off Unit for surgery or procedure    [] Off Unit for testing       [] Pending imaging to R/O fracture    [x] Refusal by Patient: Pt in bed upon arrival and stated \"I'm exhausted\" and just returned to bed after being up all morning. Will check back if time allows. [] Other      [] OT being discontinued at this time. Patient independent. No further needs. [] OT being discontinued at this time as the patient has been transferred to hospice care. No further needs.       Maia Hatchet, OTA
[id number] [facility] Wound Ostomy Continence Nurse  Consult Note       NAME:  Shelly Bolaños  MEDICAL RECORD NUMBER:  [id number]  AGE: 82 y.o. GENDER: female  : 1935  TODAY'S DATE:  2023    Subjective:      Shelly Bolaños is a 82 y.o. female with inpatient referral to Wound Ostomy Continence Specialty for:  LLE venous ulcer      Wound Identification:  Wound Type: venous  Contributing Factors: edema and venous stasis    Wound History: See PMH  Current Wound Care Treatment:  Adaptic, abd pad, roll gauze. Change daily. Patient Goal of Care:  [x] Wound Healing  [] Odor Control  [] Palliative Care  [] Pain Control   [] Other:         PAST MEDICAL HISTORY        Diagnosis Date    Bowel obstruction ([address]     Broken hip ([address]     Diverticulitis     DVT (deep venous thrombosis) (HCC)     Heart attack (HCC)     MRSA (methicillin resistant staph aureus) culture positive        PAST SURGICAL HISTORY    Past Surgical History:   Procedure Laterality Date    TOTAL HIP ARTHROPLASTY         FAMILY HISTORY    Family History   Problem Relation Age of Onset    Heart Attack Mother     COPD Father        SOCIAL HISTORY    Social History     Tobacco Use    Smoking status: Never    Smokeless tobacco: Never   Substance Use Topics    Alcohol use: Yes     Alcohol/week: 3.0 standard drinks     Types: 3 Glasses of wine per week    Drug use: Never         ALLERGIES    Allergies   Allergen Reactions    Amiodarone Rash       HOME MEDICATIONS  Prior to Admission medications    Medication Sig Start Date End Date Taking? Authorizing Provider   losartan (COZAAR) 25 MG tablet Take 1 tablet by mouth daily   Yes Historical Provider, MD   NONFORMULARY Take 1 capsule by mouth in the morning and 1 capsule in the evening. OTC supplement EyePromise.    Yes Historical Provider, MD   dilTIAZem YBARRA [facility]) 120 MG extended release capsule Take 1 capsule by mouth daily 5/10/23   Historical Provider, MD   spironolactone (ALDACTONE) 25 MG tablet Take 1 tablet by mouth
Home Oxygen Evaluation    Home Oxygen Evaluation completed. Patient is on 2 liters per minute via NC. Resting SpO2 = 99%  Resting SpO2 on room air = 94%    SpO2 on room air with exercise = 92%    Nocturnal Oximetry with patient on room air is recommended is SpO2 is between 89% and 95% (requires additional order).     Renea Kaiser RN  5:54 PM
[facility]  Office: [address], DO, Ada Castanon, DO, Julianna Chau, DO, Rod [facility] Blood, DO, Chioma Jolly MD, Reva Palacios MD, Sylwia Mendoza MD, Sameer Summers MD,  Cee Gamboa MD, Samantha Mantilla MD, Javier Littlejohn, DO, Kellen Garcia MD,  Helga Giraldo MD, Mackenzie Barnes MD, Tyree Rascon, DO, Rajeev Santana MD, Andriy Yoon MD, Wayne Lopez, DO, Viraj Zavala MD, Marcos Polanco MD, Cristiane Benz MD, Joesph Gayle MD,  Neha Samayoa DO, Mariluz Medel MD,  Ivory Howell CNP,  Yecenia Sierra CNP, Zenobia Jenkins, CNP, Rd Chand, CNP,  Zina Hurt, NICOLE, Seda Ellis, CNP, Robyn Safe, CNP, Natali Nesbitt, CNP, Taiwo Lu, CNP, Opal Rogel, CNP, Eric Bernal PA-C, Brianda Breen, PATRIC, Fara Iqbal, CNP, Noah Ken, CNP         [facility]    Progress Note    6/5/2023    11:48 AM    Name:   Shelly Bolaños  MRN:     [id number]     [place]:      [de-identified]   Room:   [address] [place] [address]-[facility] Day:  2  Admit Date:  6/3/2023  3:27 PM    PCP:   FLORIAN Schultz CNP  Code Status:  Full Code    Subjective:     C/C:   Chief Complaint   Patient presents with    Shortness of Breath     Onset 3 weeks ago    Leg Swelling     Bilateral, being treated for CHF     Interval History Status: improved. Condition has minimally improved overnight. Renal function stable. Blood pressure has been trending down and patient has been requiring Midrin for pressure support during diuretics. We have consulted cardiology and await their recommendations. Echo results pending. Brief History:     6/3 - Emergency department evaluation is completed and patient is found to have fluid volume overload with pulmonary edema. Patient is also found to be hyponatremic. At the time my exam patient is complaining of exertional dyspnea and chest tightness without pain.   She advises her chest feels tight
Nutrition Education    Educated on CHF  Learners: Patient  Readiness: Eager  Method: Explanation and Handout (Heart Failure Nutrition Therapy)  Response: Verbalizes Understanding  Contact name and number provided.           Nic MCGARRY, RDN, LDN  Lead Clinical Dietitian  RD Office Phone [phone]
Occupational Therapy  Facility/Department: [facility] PROGRESSIVE CARE  Rehabilitation Occupational Therapy Daily Treatment Note    Date: 23  Patient Name: Addis Vera       Room: 8677/7848-01  MRN: [id number]  Account: [de-identified]   : 1935  (82 y.o.) Gender: female        Due to recent hospitalization and medical condition, pt would benefit from additional intermittent skilled therapy at time of discharge. Please refer to the AM-PAC score for current functional status. Past Medical History:  has a past medical history of Bowel obstruction ([facility] Broken hip ([facility] [address] [facility] Diverticulitis, DVT (deep venous thrombosis) ([facility] [address] [facility] Heart attack ([facility] [address] [facility] and MRSA (methicillin resistant staph aureus) culture positive. Past Surgical History:   has a past surgical history that includes Total hip arthroplasty. Restrictions  Restrictions/Precautions: General Precautions, Fall Risk, Up as Tolerated  Other position/activity restrictions: 3L O2 per nc, up with assist, telemetry, 1800 fluid restriction  Required Braces or Orthoses?: No    Subjective  Subjective: Pt in chair upon arrival and agreeable to therapy. Pt stated she feels much better! Restrictions/Precautions: General Precautions; Fall Risk;Up as Tolerated             Objective     Cognition  Overall Cognitive Status: WFL  Orientation  Overall Orientation Status: Within Normal Limits   Perception  Overall Perceptual Status: WFL     ADL - no needs             Transfers  Additional Factors: Set-up; Verbal cues; Hand placement cues  Device: Walker  Sit to Stand  Assistance Level: Stand by assist  Stand to Sit  Assistance Level: Stand by assist  Skilled Clinical Factors: Pt tolerated standing activity with RW for ~1 min before needing a seated rest break. No LOB noted. Neuromuscular Education  Neuromuscular education: Yes  NDT Treatment: Sitting;Standing; Upper extremity  OT Exercises  Exercise Treatment: Pt completed BUE AROM exercises seated in chair
Occupational Therapy  Facility/Department: [facility] PROGRESSIVE CARE  Rehabilitation Occupational Therapy Daily Treatment Note    Date: 23  Patient Name: Dipak Tierney       Room: 0029/5706-74  MRN: [id number]  Account: [de-identified]   : 1935  (82 y.o.) Gender: female        Due to recent hospitalization and medical condition, pt would benefit from additional intermittent skilled therapy at time of discharge. Please refer to the AM-PAC score for current functional status. Past Medical History:  has a past medical history of Bowel obstruction ([address] Diverticulitis, DVT (deep venous thrombosis) ([facility] Heart attack ([facility] and MRSA (methicillin resistant staph aureus) culture positive. Past Surgical History:   has a past surgical history that includes Total hip arthroplasty. Restrictions  Restrictions/Precautions: General Precautions, Fall Risk, Up as Tolerated  Other position/activity restrictions: 2L O2 per nc, up with assist, telemetry, 1800 fluid restriction  Required Braces or Orthoses?: No    Subjective  Subjective: Pt in chair upon arrival and agreeable to therapy. Pt stated \"I don't want to go home with oxygen. \"  Restrictions/Precautions: General Precautions; Fall Risk;Up as Tolerated             Objective     Cognition  Overall Cognitive Status: WFL  Orientation  Overall Orientation Status: Within Functional Limits   Perception  Overall Perceptual Status: WFL     ADL - no needs             Functional Mobility  Device: Rolling walker  Activity:  (mobility in room and hallway)  Assistance Level: Stand by assist  Skilled Clinical Factors: Pt completed functional mobility on room air in room and hallway using RW with SBA for safety, SPO2 remained above 90%. Pt's SPO2 at rest seated mid 90s on room air. Pt instructed to leave O2 off at this time. RN aware and agreeable. Transfers  Surface: From chair with arms; To chair with arms  Additional Factors: Verbal cues  Device:
Patient had relatively uneventful evening, except for infrequent episodes of hypoxia when sleeping, SaO2 drops to 80s, then rebounds to 90s on 2L/NC when awakens. Patient had 1300 ml of urine through [address]. Will monitor.
Physical Therapy  DATE: 2023    NAME: Radha Lauren  MRN: [id number]   : 1935    Patient not seen this date for Physical Therapy due to:      [] Cancel by RN or physician due to:    [] Hemodialysis    [] Critical Lab Value Level     [] Blood transfusion in progress    [] Acute or unstable cardiovascular status   _MAP < 55 or more than >115  _HR < 40 or > 130    [] Acute or unstable pulmonary status   -FiO2 > 60%   _RR < 5 or >40    _O2 sats < 85%    [] Strict Bedrest    [] Off Unit for surgery or procedure    [] Off Unit for testing       [] Pending imaging to R/O fracture    [x] Refusal by Patient Pt too tired from being OOB this am will check back later if time allows     [] Other      [] PT being discontinued at this time. Patient independent. No further needs. [] PT being discontinued at this time as the patient has been transferred to hospice care. No further needs.       KANA TEJEDA, PTA
Physical Therapy  Facility/Department: [facility] PROGRESSIVE CARE  Rehabilitation Physical Therapy Treatment Note    NAME: Dipak Tierney  : 1935 (82 y.o.)  MRN: [id number]  CODE STATUS: Full Code    Date of Service: 23       Restrictions:  Restrictions/Precautions: General Precautions, Fall Risk, Up as Tolerated  Position Activity Restriction  Other position/activity restrictions: 2L O2 per nc, up with assist, telemetry, 1800 fluid restriction     SUBJECTIVE  Subjective  Subjective: pt up in chair upon arrival agreeable to PT                 OBJECTIVE  Cognition  Overall Cognitive Status: [facility]  Safety Judgement: Decreased awareness of need for assistance  Problem Solving: Decreased awareness of errors  Insights: Decreased awareness of deficits  Orientation  Overall Orientation Status: Within Functional Limits  Orientation Level: Oriented X4    Functional Mobility  Transfers  Surface: From chair with arms; To chair with arms  Additional Factors: Verbal cues  Sit to Stand  Assistance Level: Contact guard assist  Stand to Sit  Assistance Level: Contact guard assist      Environmental Mobility  Ambulation  Surface: Level surface  Device: Rolling walker  Distance: 50 ft room air oxygen saturation 94 percent during amb. on room air  Activity: Within Room  Activity Comments: pt tolerated session well  Additional Factors: Verbal cues  Assistance Level: Contact guard assist  Gait Deviations: Decreased step length bilateral             PT Exercises  Exercise Treatment: seated LE AROm x 15 reps, standing heel lifts x 15 reps      ASSESSMENT/PROGRESS TOWARDS GOALS  Vital Signs  O2 Device: Nasal cannula    Assessment  Assessment: pt progressing toward goals pt able to amb. in room on room air while maintaining oxygen saturation levels at 94 percent.  RN aware may want to also have respiratory also  do home  O2 eval as well  Activity Tolerance: Patient tolerated treatment well;Patient limited by endurance  Discharge
Pt remained calm and cooperative throughout shift. Writer reached out to NP regarding pt's home eye drops. Pt received Latanoprost in both eyes. Eye drops in pt lock up. Pt's evening bumex was held per provider orders. Pt's BP did increase from 84/66 to 105/78 at 2127, but NP still wanted bumex held. Midodrine given. Pt's BP remained stable the rest of the shift. Purewick draining. Will continue with care plan.
Reason for Consult: Congestive heart failure  Requesting Physician: Jenelle Carter DO    CHIEF COMPLAINT: Worsening shortness of breath and legs edema    Subjective:  Patient states that she feels much better today. She is off oxygen. She walked in the hallway without oxygen and she worked with physical therapist also without oxygen and without drop in her pulse ox. She feels she is back to her baseline except for the cough. She denies any chest pain. She denies orthopnea or paroxysmal nocturnal dyspnea or dizziness or palpitations or legs edema    Her fluid balance is -3.3 L since admission her weight is 7 pounds less than admission. Her sodium is mildly reduced today at 129, potassium 4.4, creatinine 0.87. Her monitor showed atrial fibrillation with controlled ventricular rate    Past Medical History:    Past Medical History:   Diagnosis Date    Bowel obstruction ([address]     Broken [address] ([facility] [facility]     Diverticulitis     DVT (deep venous thrombosis) (HCC)     Heart attack (HCC)     MRSA (methicillin resistant staph aureus) culture positive      Past Surgical History:    Past Surgical History:   Procedure Laterality Date    TOTAL HIP ARTHROPLASTY       Home Medications:  Prior to Admission medications    Medication Sig Start Date End Date Taking? Authorizing Provider   losartan (COZAAR) 25 MG tablet Take 1 tablet by mouth daily   Yes Historical Provider, MD   NONFORMULARY Take 1 capsule by mouth in the morning and 1 capsule in the evening. OTC supplement EyePromise.    Yes Historical Provider, MD   dilTIAZem YBARRA [facility]) 120 MG extended release capsule Take 1 capsule by mouth daily 5/10/23   Historical Provider, MD   spironolactone (ALDACTONE) 25 MG tablet Take 1 tablet by mouth daily 5/31/23   Historical Provider, MD   atorvastatin (LIPITOR) 80 MG tablet TAKE ONE TABLET BY MOUTH DAILY 5/22/23   FLORIAN Maloney CNP   traZODone (DESYREL) 50 MG tablet TAKE ONE TABLET BY MOUTH ONCE NIGHTLY 4/24/23
Reason for Consult: Congestive heart failure  Requesting Physician: Oscar Carter DO    CHIEF COMPLAINT: Worsening shortness of breath and legs edema    Subjective:  Patient states that her breathing is much better but she is not back to her baseline. Patient stated that not all her urine output is reported because the catheter was loose during the night and her bed was full of urine in the morning and the same happened before noon today. She is able to ambulate better in the room without difficulty breathing. She still on oxygen at 2 L/min per nasal cannula. She denies chest pain or dizziness or palpitations. She states that her legs are much better. Her total fluid balance is -2 L since admission, her weight is down only for pounds since admission which does not correlate much with her fluid balance, sodium 130, potassium 4 and creatinine 0.86. Blood pressure also has been on the high side    Past Medical History:    Past Medical History:   Diagnosis Date    Bowel obstruction ([address]     Broken hip ([address]     Diverticulitis     DVT (deep venous thrombosis) ([facility])     Heart attack ([facility])     MRSA (methicillin resistant staph aureus) culture positive      Past Surgical History:    Past Surgical History:   Procedure Laterality Date    TOTAL HIP ARTHROPLASTY       Home Medications:  Prior to Admission medications    Medication Sig Start Date End Date Taking? Authorizing Provider   losartan (COZAAR) 25 MG tablet Take 1 tablet by mouth daily   Yes Historical Provider, MD   NONFORMULARY Take 1 capsule by mouth in the morning and 1 capsule in the evening. OTC supplement EyePromise.    Yes Historical Provider, MD   dilTIAZem YBARRA [facility]) 120 MG extended release capsule Take 1 capsule by mouth daily 5/10/23   Historical Provider, MD   spironolactone (ALDACTONE) 25 MG tablet Take 1 tablet by mouth daily 5/31/23   Historical Provider, MD   atorvastatin (LIPITOR) 80 MG tablet TAKE ONE TABLET BY MOUTH DAILY 5/22/23
FLORIAN Nieto CNP   apixaban (ELIQUIS) 5 MG TABS tablet Take 1 tablet by mouth 2 times daily    Historical Provider, MD   potassium chloride (KLOR-CON M) 10 MEQ extended release tablet TAKE TWO TABLETS BY MOUTH DAILY  Patient taking differently: 1 tablet daily TAKE TWO TABLETS BY MOUTH DAILY 12/13/22   FLORIAN Nieto CNP   Multiple Vitamins-Minerals (THERAPEUTIC MULTIVITAMIN-MINERALS) tablet Take 1 tablet by mouth daily    Historical Provider, MD   Magnesium Gluconate 250 MG TABS Take 250 mg by mouth daily    Historical Provider, MD   furosemide (LASIX) 40 MG tablet TAKE ONE TABLET BY MOUTH TWICE A DAY 10/19/22   FLORIAN Nieto CNP   nitroGLYCERIN (NITROSTAT) 0.4 MG SL tablet Place 1 tablet under the tongue every 5 minutes as needed for Chest pain 3/15/22   FLORIAN Nieto CNP   latanoprost (XALATAN) 0.005 % ophthalmic solution Place 1 drop into both eyes daily  Patient taking differently: Place 1 drop into both eyes nightly 9/15/20   FLORIAN Nieto CNP   aspirin 81 MG tablet Take 1 tablet by mouth daily    Historical Provider, MD   brimonidine (ALPHAGAN) 0.2 % ophthalmic solution Place 1 drop into the left eye in the morning and 1 drop in the evening.     Historical Provider, MD   carvedilol (COREG) 3.125 MG tablet Take 1 tablet by mouth daily    Historical Provider, MD     Current Medications:    Current Facility-Administered Medications   Medication Dose Route Frequency Provider Last Rate Last Admin    [START ON 6/7/2023] levothyroxine (SYNTHROID) tablet 50 mcg  50 mcg Oral Daily Marshal P Blood, DO        aspirin EC tablet 81 mg  81 mg Oral Daily Marshal P Blood, DO   81 mg at 06/06/23 1141    atorvastatin (LIPITOR) tablet 80 mg  80 mg Oral Nightly Marsahl P Blood, DO        brimonidine (ALPHAGAN) 0.2 % ophthalmic solution 1 drop  1 drop Left Eye BID Marshal P Blood, DO   1 drop at 06/06/23 1016    magnesium oxide (MAG-OX) tablet 200 mg  200 mg Oral Daily Stephanie Guerra
SL tablet, Place 1 tablet under the tongue every 5 minutes as needed for Chest pain  latanoprost (XALATAN) 0.005 % ophthalmic solution, Place 1 drop into both eyes daily (Patient taking differently: Place 1 drop into both eyes nightly)  aspirin 81 MG tablet, Take 1 tablet by mouth daily  brimonidine (ALPHAGAN) 0.2 % ophthalmic solution, Place 1 drop into the left eye in the morning and 1 drop in the evening.   carvedilol (COREG) 3.125 MG tablet, Take 1 tablet by mouth daily
independent with amb w/ RW for 50'  Short Term Goal 2: to be independent with bed mobility  Short Term Goal 3: to be independent with all transfers  Short Term Goal 4: to be able to participate in at least  25' of PT (nigel, amb, bed mobility, transfers)    [address]: 5-7 times per week  Current Treatment Recommendations: Strengthening; Functional mobility training;Transfer training;Gait training; Endurance training;Home exercise program;Safety education & training  Safety Devices  Type of Devices: All dago prominences offloaded;Call light within reach;Gait belt;Left in chair;Nurse notified    EDUCATION  Education  Education Given To: Patient  Education Provided: Role of Therapy;Plan of Care;ADL Function;Cognition; Safety;Transfer Training; Fall Prevention Strategies; Energy Conservation  Education Provided Comments: safety in function, fall prevention/call light use,  Education Method: Verbal  Barriers to Learning: None  Education Outcome: Verbalized understanding;Demonstrated understanding    AM-PAC Score    AM-PAC Inpatient Mobility Raw Score : 16  AM-PAC Inpatient T-Scale Score : 40.78  Mobility Inpatient CMS 0-100% Score: 54.16  Mobility Inpatient CMS G-Code Modifier:CK        Therapy Time   Individual Concurrent Group Co-treatment   Time In 1404 1404       Time Out 1419 1419       Minutes 15 15                 KANA TEJEDA PTA, 06/06/23 at 2:42 PM
medications at that time which temporarily alleviated her symptoms. 4 days ago the patient could no longer tolerate her exertional dyspnea so she contacted her cardiologist who prescribed spironolactone, diuretics, and changed her beta-blockers. Patient advised this was unsuccessful at alleviating her symptoms. \"    Review of Systems:     Constitutional:  negative for chills, fevers, sweats  Respiratory:  negative for cough, wheezing  Cardiovascular:  negative for chest pain, chest pressure/discomfort, lower extremity edema, palpitations  Gastrointestinal:  negative for abdominal pain, constipation, diarrhea, nausea, vomiting  Neurological:  negative for dizziness, headache    Medications: Allergies: Allergies   Allergen Reactions    Amiodarone Rash       Current Meds:   Scheduled Meds:    levothyroxine  50 mcg Oral Daily    aspirin  81 mg Oral Daily    atorvastatin  80 mg Oral Nightly    brimonidine  1 drop Left Eye BID    magnesium oxide  200 mg Oral Daily    therapeutic multivitamin-minerals  1 tablet Oral Daily    traZODone  50 mg Oral Nightly    potassium chloride  20 mEq Oral Daily    bumetanide  2 mg IntraVENous BID    metoprolol succinate  50 mg Oral Daily    latanoprost  1 drop Both Eyes Nightly    apixaban  5 mg Oral BID    losartan  25 mg Oral Daily    pantoprazole  40 mg Oral QAM AC    sodium chloride flush  5-40 mL IntraVENous 2 times per day     Continuous Infusions:    sodium chloride 10 mL/hr (06/06/23 1528)     PRN Meds: nitroGLYCERIN, guaiFENesin, sodium chloride flush, sodium chloride flush, sodium chloride, ondansetron **OR** ondansetron, polyethylene glycol, acetaminophen **OR** acetaminophen    Data:     Past Medical History:   has a past medical history of Bowel obstruction ([facility] [address] Broken hip ([facility] [address] [facility] Diverticulitis, DVT (deep venous thrombosis) ([facility] [address] [facility] Heart attack ([facility] [address] [facility] and MRSA (methicillin resistant staph aureus) culture positive.     Social History:   reports that she has never
medications at that time which temporarily alleviated her symptoms. 4 days ago the patient could no longer tolerate her exertional dyspnea so she contacted her cardiologist who prescribed spironolactone, diuretics, and changed her beta-blockers. Patient advised this was unsuccessful at alleviating her symptoms. \"    Review of Systems:     Constitutional:  negative for chills, fevers, sweats  Respiratory:  negative for cough, wheezing  Cardiovascular:  negative for chest pain, chest pressure/discomfort, palpitations  Gastrointestinal:  negative for abdominal pain, constipation, diarrhea, nausea, vomiting  Neurological:  negative for dizziness, headache    Medications: Allergies: Allergies   Allergen Reactions    Amiodarone Rash       Current Meds:   Scheduled Meds:    bumetanide  2 mg IntraVENous BID    metoprolol succinate  50 mg Oral Daily    iron sucrose  300 mg IntraVENous Q24H    levothyroxine  100 mcg Oral Daily    latanoprost  1 drop Both Eyes Nightly    apixaban  5 mg Oral BID    [Held by provider] losartan  25 mg Oral Daily    pantoprazole  40 mg Oral QAM AC    sodium chloride flush  5-40 mL IntraVENous 2 times per day    sodium chloride  1 g Oral TID WC     Continuous Infusions:    sodium chloride 10 mL/hr at 06/05/23 2256     PRN Meds: midodrine, sodium chloride flush, sodium chloride flush, sodium chloride, ondansetron **OR** ondansetron, polyethylene glycol, acetaminophen **OR** acetaminophen    Data:     Past Medical History:   has a past medical history of Bowel obstruction ([facility] [address] [address] Broken hip ([facility] [address] Diverticulitis, DVT (deep venous thrombosis) ([facility] [address] [facility] and MRSA (methicillin resistant staph aureus) culture positive. Social History:   reports that she has never smoked. She has never used smokeless tobacco. She reports current alcohol use of about 3.0 standard drinks per week. She reports that she does not use drugs.      Family History:   Family History   Problem Relation Age
1/2 grade to assist with functional tasks/I with B UE HEP with use of handouts as needed. Short Term Goal 3: toileting to SBA with use of AD/grab bars as needed. Short Term Goal 4: UB ADLs to Set up and LB ADLs to SBA with use of AD/AE as needed. Short Term Goal 5: increased standing tang > 15 min with SBA using AD as needed to reduce risk of falls during functional tasks. Long Term Goals  Long Term Goal 1: Pt to be I with fall prevention edu, EC/WS tech, recommendations for discharge/AE, disease specific education with use of handouts as needed. Assessment  Performance deficits / Impairments: Decreased functional mobility ; Decreased ADL status; Decreased balance;Decreased posture;Decreased high-level IADLs;Decreased endurance;Decreased strength;Decreased cognition  Assessment: Skilled OT services are indicated to increase I and safety during functional tasks to return home at prior level of function as able. Prognosis: Good  Decision Making: Medium Complexity  Discharge Recommendations: Patient would benefit from continued therapy after discharge  Occupational Therapy Plan  Times Per Week: 4-5x/wk 1x/day as tang  Specific Instructions for Next Treatment: CHF education, EC/WS tech edu  Current Treatment Recommendations: Strengthening;Balance training;Functional mobility training; Endurance training; Safety education & training;Equipment evaluation, education, & procurement;Patient/Caregiver education & training;Self-Care / ADL; Home management training       Therapy Time   Individual Concurrent Group Co-treatment   Time In 1447 (Plus 10 min for chart review/RN communication)         Time Out 1520         Minutes 33+ 10 = 43 min          Tx time: 23 min        Sanjana Puga OT
dose that was given  On eliquis for afib  Coreg switched to toprol  Resumed home eye drops  Reduced dose of synthroid: she is naive to it  May need home o2 : home o2 eval today  Outpatient sleep study    Stacey Carter, DO  6/8/2023  10:04 AM

## 2023-06-08 NOTE — DISCHARGE SUMMARY
Discharge information given and explained to pt. Pt verbalized understanding. Pt discharged home with all documented belongings.
taking these medications      amiodarone 400 MG tablet  Commonly known as: PACERONE     carvedilol 3.125 MG tablet  Commonly known as: COREG     dilTIAZem 120 MG extended release capsule  Commonly known as: TIAZAC     furosemide 40 MG tablet  Commonly known as: LASIX     spironolactone 25 MG tablet  Commonly known as: ALDACTONE               Where to Get Your Medications        These medications were sent to [facility] [id number] [facility], [address] [place] [address] - F [phone]  [facility], [address] [place] [address]      Phone: [phone]   bumetanide 2 MG tablet  levothyroxine 50 MCG tablet  losartan 50 MG tablet  metoprolol succinate 50 MG extended release tablet         Discharge Procedure Orders   [facility]   Referral Priority: Routine Referral Type: Eval and Treat   Referral Reason: Specialty Services Required   Number of Visits Requested: 1       Time Spent on discharge is  35 mins in patient examination, evaluation, counseling as well as medication reconciliation, prescriptions for required medications, discharge plan and follow up. Electronically signed by   Ashlyn Carter DO  6/8/2023  6:03 PM      Thank you Dr. Johana Larkin, APRN - CNP for the opportunity to be involved in this patient's care.

## 2023-06-08 NOTE — PLAN OF CARE
Pt had tonia overall, pt on 2L nc sating at 95%. Pt is Aox4, pt had 650 out from puriwick overnight. Pt up as tang. Pt had no complaints of pain overnight, Pt had complaints of a cough and was given prn robitussin. Pt safety maintained, VSS. All questions asked and answered wcm.     Problem: Discharge Planning  Goal: Discharge to home or other facility with appropriate resources  Outcome: Progressing     Problem: Safety - Adult  Goal: Free from fall injury  6/8/2023 0522 by Palmer Sanchez, RN  Outcome: Progressing     Problem: ABCDS Injury Assessment  Goal: Absence of physical injury  6/8/2023 4777 by Palmer Sanchez, RN  Outcome: Progressing

## 2023-06-08 NOTE — PLAN OF CARE
Pt remained free from falls and injury throughout shift. Pt weaned off O2 and preparing for discharge.    Problem: Discharge Planning  Goal: Discharge to home or other facility with appropriate resources  6/8/2023 1830 by Sierra Melchor RN  Outcome: Progressing     Problem: Safety - Adult  Goal: Free from fall injury  6/8/2023 1830 by Sierra Melchor RN  Outcome: Progressing    Problem: ABCDS Injury Assessment  Goal: Absence of physical injury  6/8/2023 1830 by Sierra Melchor RN  Outcome: Progressing

## 2023-06-09 ENCOUNTER — TELEPHONE (OUTPATIENT)
Dept: FAMILY MEDICINE CLINIC | Age: 88
End: 2023-06-09

## 2023-06-09 ENCOUNTER — TELEPHONE (OUTPATIENT)
Dept: OTHER | Facility: CLINIC | Age: 88
End: 2023-06-09

## 2023-06-09 PROBLEM — J96.21 ACUTE ON CHRONIC RESPIRATORY FAILURE WITH HYPOXIA (HCC): Status: ACTIVE | Noted: 2023-06-09

## 2023-06-09 NOTE — TELEPHONE ENCOUNTER
Juanita Grey from [facility] contacted the office to get verbal orders for following for home health care. Patient was discharged from [facility] recently. Patients lv 8/29/2022. Okay to leave detailed message. [phone] ext.  [phone]

## 2023-06-09 NOTE — TELEPHONE ENCOUNTER
Writer contacted ED provider to inform of 30 day readmission risk. ED provider informed writer of readmission.     Call Back: If you need to call back to inform of disposition you can contact me at [phone]

## 2023-06-10 PROBLEM — J96.01 ACUTE RESPIRATORY FAILURE WITH HYPOXIA (HCC): Status: ACTIVE | Noted: 2023-06-09

## 2023-06-10 PROBLEM — J40 BRONCHITIS: Status: ACTIVE | Noted: 2023-06-10

## 2023-06-21 ENCOUNTER — HOSPITAL ENCOUNTER (OUTPATIENT)
Dept: WOUND CARE | Age: 88
Discharge: HOME | End: 2023-06-21
Payer: MEDICARE

## 2023-06-21 ENCOUNTER — HOSPITAL ENCOUNTER (OUTPATIENT)
Dept: WOUND CARE | Age: 88
Discharge: HOME | End: 2023-06-21

## 2023-06-21 VITALS
RESPIRATION RATE: 17 BRPM | HEART RATE: 83 BPM | TEMPERATURE: 96.9 F | WEIGHT: 131 LBS | DIASTOLIC BLOOD PRESSURE: 65 MMHG | HEIGHT: 60 IN | SYSTOLIC BLOOD PRESSURE: 106 MMHG | BODY MASS INDEX: 25.72 KG/M2

## 2023-06-21 DIAGNOSIS — S81.802A OPEN WOUND OF LEFT LOWER LEG, INITIAL ENCOUNTER: Primary | ICD-10-CM

## 2023-06-21 PROCEDURE — 11042 DBRDMT SUBQ TIS 1ST 20SQCM/<: CPT

## 2023-06-21 RX ORDER — LIDOCAINE HYDROCHLORIDE 40 MG/ML
SOLUTION TOPICAL ONCE
OUTPATIENT
Start: 2023-06-21 | End: 2023-06-21

## 2023-06-21 RX ORDER — LIDOCAINE HYDROCHLORIDE 20 MG/ML
JELLY TOPICAL ONCE
OUTPATIENT
Start: 2023-06-21 | End: 2023-06-21

## 2023-06-21 RX ORDER — LIDOCAINE HYDROCHLORIDE 20 MG/ML
JELLY TOPICAL ONCE
Status: COMPLETED | OUTPATIENT
Start: 2023-06-21 | End: 2023-06-21

## 2023-06-21 RX ADMIN — LIDOCAINE HYDROCHLORIDE 6 ML: 20 JELLY TOPICAL at 09:35

## 2023-06-21 NOTE — PROGRESS NOTES
Multilayer Compression Wrap   (Not Unna) Below the Knee    NAME:  Daivd Mohs  YOB: 1935  MEDICAL RECORD NUMBER:  [id number]  DATE:  6/21/2023    Multilayer compression wrap: Removed old Multilayer wrap if indicated and wash leg with mild soap/water. Applied moisturizing agent to dry skin as needed. Applied primary and secondary dressing as ordered. Applied multilayered dressing below the knee to left lower leg. Instructed patient/caregiver not to remove dressing and to keep it clean and dry. Instructed patient/caregiver on complications to report to provider, such as pain, numbness in toes, heavy drainage, and slippage of dressing. Instructed patient on purpose of compression dressing and on activity and exercise recommendations.       Electronically signed by Kade Roberts RN on 6/21/2023 at 9:54 AM

## 2023-06-21 NOTE — PROGRESS NOTES
[address]   Progress Note and Procedure Note      Zayda RECORD NUMBER:  [id number]  AGE: 82 y.o. GENDER: female  : 1935  EPISODE DATE:  2023    Subjective:     Chief Complaint   Patient presents with    Wound Check     Left lower leg         HISTORY of PRESENT ILLNESS RIK Brown is a 82 y.o. female who presents today for wound/ulcer evaluation. History of Wound Context: Recent CHF exacerbation requiring hospitalization. Legs were swollen but now she is better controlled and they are normal size. Has left pretibial venous ulcer. Wound/Ulcer Pain Timing/Severity: intermittent  Quality of pain: sharp  Severity:  2 / 10   Modifying Factors: None  Associated Signs/Symptoms: edema     Ulcer Identification:  Ulcer Type: venous  Contributing Factors: edema        PAST MEDICAL HISTORY        Diagnosis Date    Bowel obstruction (HCC)     Broken hip (HCC)     Diverticulitis     DVT (deep venous thrombosis) (HCC)     Heart attack (HCC)     MRSA (methicillin resistant staph aureus) culture positive        PAST SURGICAL HISTORY    Past Surgical History:   Procedure Laterality Date    TOTAL HIP ARTHROPLASTY         FAMILY HISTORY    Family History   Problem Relation Age of Onset    Heart Attack Mother     COPD Father        SOCIAL HISTORY    Social History     Tobacco Use    Smoking status: Never    Smokeless tobacco: Never   Substance Use Topics    Alcohol use:  Yes     Alcohol/week: 3.0 standard drinks     Types: 3 Glasses of wine per week    Drug use: Never       ALLERGIES    Allergies   Allergen Reactions    Amiodarone Rash       MEDICATIONS    Current Outpatient Medications on File Prior to Encounter   Medication Sig Dispense Refill    midodrine (PROAMATINE) 5 MG tablet Take 1 tablet by mouth See Admin Instructions Take 1 tab with breakfast and again at 1pm each day 60 tablet 0    metoprolol succinate (TOPROL XL) 50 MG extended release tablet

## 2023-06-21 NOTE — DISCHARGE INSTRUCTIONS
[address] [facility] -Phone: [phone] Fax: [phone]    Visit  Discharge Instructions / Physician Orders     DATE: 6/21/2023     Home Care: N/A     SUPPLIES ORDERED THRU: [facility]      Wound Location: Left Medial Pretib     Cleanse with: Keep Dry and Intact     Dressing Orders: Fibracol, Silvercel, 3 Layer Compression     Frequency: Keep Dry and Intact     Additional Orders: Increase protein to diet (meat, cheese, eggs, fish, peanut butter, nuts and beans)  Multivitamin daily  ELEVATE LEGS AS MUCH AS POSSIBLE  11/21/22-Culture taken     Your next appointment with [facility] is in 1 week     (Please note your next appointment above and if you are unable to keep, kindly give a 24 hour notice. Thank you.)  If more than 15 min late we cannot guarantee you will be seen due to clinician schedule  Per Policy, Excessive cancellation will call for dismissal from program.     If you experience any of the following, please call the [facility] during business hours:  [phone]  Your Phone call may be forwarded to [employer] during business hours that [employer] is closed. * Increase in Pain  * Temperature over 101  * Increase in drainage from your wound  * Drainage with a foul odor  * Bleeding  * Increase in swelling  * Need for compression bandage changes due to slippage, breakthrough drainage. If you need medical attention outside of the business hours of the [facility] please contact your PCP or go to the nearest emergency room. The information contained in the After Visit Summary has been reviewed with me, the patient and/or responsible adult, by my health care provider(s). I had the opportunity to ask questions regarding this information.  I have elected to receive;      []After Visit Summary  [x]Comprehensive Discharge Instruction        Patient signature______________________________________Date:________  Electronically signed by

## 2023-06-23 ENCOUNTER — TELEPHONE (OUTPATIENT)
Dept: FAMILY MEDICINE CLINIC | Age: 88
End: 2023-06-23

## 2023-06-26 ENCOUNTER — OFFICE VISIT (OUTPATIENT)
Dept: FAMILY MEDICINE CLINIC | Age: 88
End: 2023-06-26
Payer: MEDICARE

## 2023-06-26 VITALS
HEIGHT: 60 IN | SYSTOLIC BLOOD PRESSURE: 110 MMHG | HEART RATE: 107 BPM | WEIGHT: 136 LBS | BODY MASS INDEX: 26.7 KG/M2 | DIASTOLIC BLOOD PRESSURE: 82 MMHG | OXYGEN SATURATION: 96 %

## 2023-06-26 DIAGNOSIS — G47.10 HYPERSOMNIA, UNSPECIFIED: ICD-10-CM

## 2023-06-26 DIAGNOSIS — R06.83 SNORING: ICD-10-CM

## 2023-06-26 DIAGNOSIS — I50.23 ACUTE ON CHRONIC SYSTOLIC HEART FAILURE (HCC): ICD-10-CM

## 2023-06-26 DIAGNOSIS — I48.11 LONGSTANDING PERSISTENT ATRIAL FIBRILLATION (HCC): ICD-10-CM

## 2023-06-26 DIAGNOSIS — I10 ESSENTIAL HYPERTENSION: Primary | ICD-10-CM

## 2023-06-26 DIAGNOSIS — E78.2 MIXED HYPERLIPIDEMIA: ICD-10-CM

## 2023-06-26 PROCEDURE — 1123F ACP DISCUSS/DSCN MKR DOCD: CPT | Performed by: NURSE PRACTITIONER

## 2023-06-26 PROCEDURE — 3079F DIAST BP 80-89 MM HG: CPT | Performed by: NURSE PRACTITIONER

## 2023-06-26 PROCEDURE — 99213 OFFICE O/P EST LOW 20 MIN: CPT | Performed by: NURSE PRACTITIONER

## 2023-06-26 PROCEDURE — 3074F SYST BP LT 130 MM HG: CPT | Performed by: NURSE PRACTITIONER

## 2023-06-26 SDOH — ECONOMIC STABILITY: FOOD INSECURITY: WITHIN THE PAST 12 MONTHS, THE FOOD YOU BOUGHT JUST DIDN'T LAST AND YOU DIDN'T HAVE MONEY TO GET MORE.: NEVER TRUE

## 2023-06-26 SDOH — ECONOMIC STABILITY: INCOME INSECURITY: HOW HARD IS IT FOR YOU TO PAY FOR THE VERY BASICS LIKE FOOD, HOUSING, MEDICAL CARE, AND HEATING?: NOT HARD AT ALL

## 2023-06-26 SDOH — ECONOMIC STABILITY: FOOD INSECURITY: WITHIN THE PAST 12 MONTHS, YOU WORRIED THAT YOUR FOOD WOULD RUN OUT BEFORE YOU GOT MONEY TO BUY MORE.: NEVER TRUE

## 2023-06-26 SDOH — ECONOMIC STABILITY: HOUSING INSECURITY
IN THE LAST 12 MONTHS, WAS THERE A TIME WHEN YOU DID NOT HAVE A STEADY PLACE TO SLEEP OR SLEPT IN A SHELTER (INCLUDING NOW)?: NO

## 2023-06-26 SDOH — ECONOMIC STABILITY: TRANSPORTATION INSECURITY
IN THE PAST 12 MONTHS, HAS LACK OF TRANSPORTATION KEPT YOU FROM MEETINGS, WORK, OR FROM GETTING THINGS NEEDED FOR DAILY LIVING?: NO

## 2023-06-26 ASSESSMENT — ENCOUNTER SYMPTOMS
ABDOMINAL PAIN: 0
COUGH: 0
SINUS PAIN: 0
NAUSEA: 0
VOMITING: 0
SORE THROAT: 0
SHORTNESS OF BREATH: 0
DIARRHEA: 0

## 2023-06-26 ASSESSMENT — PATIENT HEALTH QUESTIONNAIRE - PHQ9
1. LITTLE INTEREST OR PLEASURE IN DOING THINGS: 0
SUM OF ALL RESPONSES TO PHQ QUESTIONS 1-9: 0
2. FEELING DOWN, DEPRESSED OR HOPELESS: 0
SUM OF ALL RESPONSES TO PHQ QUESTIONS 1-9: 0
SUM OF ALL RESPONSES TO PHQ9 QUESTIONS 1 & 2: 0

## 2023-06-28 ENCOUNTER — HOSPITAL ENCOUNTER (OUTPATIENT)
Dept: WOUND CARE | Age: 88
Discharge: HOME | End: 2023-06-28
Payer: MEDICARE

## 2023-06-28 VITALS
TEMPERATURE: 95.4 F | WEIGHT: 136 LBS | HEART RATE: 87 BPM | RESPIRATION RATE: 18 BRPM | HEIGHT: 60 IN | SYSTOLIC BLOOD PRESSURE: 123 MMHG | DIASTOLIC BLOOD PRESSURE: 69 MMHG | BODY MASS INDEX: 26.7 KG/M2

## 2023-06-28 DIAGNOSIS — S81.802A OPEN WOUND OF LEFT LOWER LEG, INITIAL ENCOUNTER: Primary | ICD-10-CM

## 2023-06-28 PROCEDURE — 11042 DBRDMT SUBQ TIS 1ST 20SQCM/<: CPT

## 2023-06-28 RX ORDER — LIDOCAINE HYDROCHLORIDE 20 MG/ML
JELLY TOPICAL ONCE
OUTPATIENT
Start: 2023-06-28 | End: 2023-06-28

## 2023-06-28 RX ORDER — LIDOCAINE HYDROCHLORIDE 20 MG/ML
JELLY TOPICAL ONCE
Status: COMPLETED | OUTPATIENT
Start: 2023-06-28 | End: 2023-06-28

## 2023-06-28 RX ORDER — LIDOCAINE HYDROCHLORIDE 40 MG/ML
SOLUTION TOPICAL ONCE
OUTPATIENT
Start: 2023-06-28 | End: 2023-06-28

## 2023-06-28 RX ADMIN — LIDOCAINE HYDROCHLORIDE 6 ML: 20 JELLY TOPICAL at 09:44

## 2023-07-05 ENCOUNTER — HOSPITAL ENCOUNTER (OUTPATIENT)
Dept: WOUND CARE | Age: 88
Discharge: HOME | End: 2023-07-05
Payer: MEDICARE

## 2023-07-05 VITALS
WEIGHT: 136 LBS | BODY MASS INDEX: 26.7 KG/M2 | DIASTOLIC BLOOD PRESSURE: 72 MMHG | SYSTOLIC BLOOD PRESSURE: 124 MMHG | TEMPERATURE: 97.6 F | HEART RATE: 60 BPM | HEIGHT: 60 IN

## 2023-07-05 DIAGNOSIS — S81.802S OPEN WOUND OF LEFT LOWER LEG, SEQUELA: Primary | ICD-10-CM

## 2023-07-05 DIAGNOSIS — S81.802A OPEN WOUND OF LEFT LOWER LEG, INITIAL ENCOUNTER: ICD-10-CM

## 2023-07-05 PROCEDURE — 11042 DBRDMT SUBQ TIS 1ST 20SQCM/<: CPT

## 2023-07-05 RX ORDER — LIDOCAINE HYDROCHLORIDE 20 MG/ML
JELLY TOPICAL ONCE
Status: COMPLETED | OUTPATIENT
Start: 2023-07-05 | End: 2023-07-05

## 2023-07-05 RX ORDER — LIDOCAINE HYDROCHLORIDE 20 MG/ML
JELLY TOPICAL ONCE
OUTPATIENT
Start: 2023-07-05 | End: 2023-07-05

## 2023-07-05 RX ORDER — LIDOCAINE HYDROCHLORIDE 40 MG/ML
SOLUTION TOPICAL ONCE
OUTPATIENT
Start: 2023-07-05 | End: 2023-07-05

## 2023-07-05 RX ADMIN — LIDOCAINE HYDROCHLORIDE 6 ML: 20 JELLY TOPICAL at 10:23

## 2023-07-05 NOTE — PROGRESS NOTES
Multilayer Compression Wrap   (Not Unna) Below the Knee    NAME:  Alejandro Vaca  YOB: 1935  MEDICAL RECORD NUMBER:  [id number]  DATE:  7/5/2023    Multilayer compression wrap: Removed old Multilayer wrap if indicated and wash leg with mild soap/water. Applied moisturizing agent to dry skin as needed. Applied primary and secondary dressing as ordered. Applied multilayered dressing below the knee to left lower leg. Instructed patient/caregiver not to remove dressing and to keep it clean and dry. Instructed patient/caregiver on complications to report to provider, such as pain, numbness in toes, heavy drainage, and slippage of dressing. Instructed patient on purpose of compression dressing and on activity and exercise recommendations.       Electronically signed by Tangela Kaplan RN on 7/5/2023 at 10:46 AM

## 2023-07-05 NOTE — DISCHARGE INSTRUCTIONS
[address] -Phone: [phone] Fax: [phone]    Visit  Discharge Instructions / Physician Orders     DATE: 7/5/2023     Home Care: N/A     SUPPLIES ORDERED THRU: [facility]      Wound Location: Left Medial Pretib     Cleanse with: Keep Dry and Intact     Dressing Orders: Fibracol, Silvercel, 4 Layer Compression     Frequency: Keep Dry and Intact     Additional Orders: Increase protein to diet (meat, cheese, eggs, fish, peanut butter, nuts and beans)  Multivitamin daily  ELEVATE LEGS AS MUCH AS POSSIBLE  11/21/22-Culture taken     Your next appointment with [address] [place], [place] [address] is in 1 week with Dr. Bora Cisse     (Please note your next appointment above and if you are unable to keep, kindly give a 24 hour notice. Thank you.)  If more than 15 min late we cannot guarantee you will be seen due to clinician schedule  Per Policy, Excessive cancellation will call for dismissal from program.     If you experience any of the following, please call the [address] [place] [address] during business hours:  [phone]  Your Phone call may be forwarded to [facility] during business hours that [employer] is closed. * Increase in Pain  * Temperature over 101  * Increase in drainage from your wound  * Drainage with a foul odor  * Bleeding  * Increase in swelling  * Need for compression bandage changes due to slippage, breakthrough drainage. If you need medical attention outside of the business hours of the [address] [place] [address] please contact your PCP or go to the nearest emergency room. The information contained in the After Visit Summary has been reviewed with me, the patient and/or responsible adult, by my health care provider(s). I had the opportunity to ask questions regarding this information.  I have elected to receive;      []After Visit Summary  [x]Comprehensive Discharge Instruction        Patient signature______________________________________Date:________  Electronically

## 2023-07-05 NOTE — PLAN OF CARE
Problem: Discharge Planning  Goal: Discharge to home or other facility with appropriate resources  Outcome: Progressing     Problem: Safety - Adult  Goal: Free from fall injury  Outcome: Progressing     Problem: Wound:  Goal: Will show signs of wound healing; wound closure and no evidence of infection  Description: Will show signs of wound healing; wound closure and no evidence of infection  Outcome: Progressing

## 2023-07-05 NOTE — PROGRESS NOTES
[address] [place] [address]   Progress Note and Procedure Note      Arslan Rogel RECORD NUMBER:  [id number]  AGE: 82 y.o. GENDER: female  : 1935  EPISODE DATE:  2023    Subjective:     Chief Complaint   Patient presents with    Wound Check     Lle           HISTORY of PRESENT ILLNESS HPI    Velia Hanson is a 82 y.o. female who presents today for wound/ulcer evaluation. History of Wound Context: Recent CHF exacerbation requiring hospitalization. Legs were swollen but now she is better controlled and they are normal size. Has left pretibial venous ulcer. Wound/Ulcer Pain Timing/Severity: intermittent  Quality of pain: sharp  Severity:  2 / 10   Modifying Factors: None  Associated Signs/Symptoms: edema     Ulcer Identification:  Ulcer Type: venous  Contributing Factors: edema        PAST MEDICAL HISTORY        Diagnosis Date    Bowel obstruction (HCC)     Broken hip (HCC)     Diverticulitis     DVT (deep venous thrombosis) (HCC)     Heart attack (HCC)     MRSA (methicillin resistant staph aureus) culture positive        PAST SURGICAL HISTORY    Past Surgical History:   Procedure Laterality Date    TOTAL HIP ARTHROPLASTY         FAMILY HISTORY    Family History   Problem Relation Age of Onset    Heart Attack Mother     COPD Father        SOCIAL HISTORY    Social History     Tobacco Use    Smoking status: Never    Smokeless tobacco: Never   Substance Use Topics    Alcohol use:  Yes     Alcohol/week: 3.0 standard drinks     Types: 3 Glasses of wine per week    Drug use: Never       ALLERGIES    Allergies   Allergen Reactions    Amiodarone Rash       MEDICATIONS    Current Outpatient Medications on File Prior to Encounter   Medication Sig Dispense Refill    midodrine (PROAMATINE) 5 MG tablet Take 1 tablet by mouth See Admin Instructions Take 1 tab with breakfast and again at 1pm each day 60 tablet 0    metoprolol succinate (TOPROL XL) 50 MG extended release tablet Take 1 tablet

## 2023-07-10 NOTE — DISCHARGE INSTRUCTIONS
[address] -Phone: [phone] Fax: [phone]    Visit  Discharge Instructions / Physician Orders     DATE: 7/12/2023     Home Care: N/A     SUPPLIES ORDERED THRU: [facility]      Wound Location: Left Medial Pretib     Cleanse with: Keep Dry and Intact     Dressing Orders: Fibracol, Silvercel, 4 Layer Compression     Frequency: Keep Dry and Intact     Additional Orders: Increase protein to diet (meat, cheese, eggs, fish, peanut butter, nuts and beans)  Multivitamin daily  ELEVATE LEGS AS MUCH AS POSSIBLE  11/21/22-Culture taken     Your next appointment with [address] [place] [address] is in 1 week with Dr. Merary Mcconnell     (Please note your next appointment above and if you are unable to keep, kindly give a 24 hour notice. Thank you.)  If more than 15 min late we cannot guarantee you will be seen due to clinician schedule  Per Policy, Excessive cancellation will call for dismissal from program.     If you experience any of the following, please call the [address] [place] [address] during business hours:  [phone]  Your Phone call may be forwarded to [facility] during business hours that [facility] is closed. * Increase in Pain  * Temperature over 101  * Increase in drainage from your wound  * Drainage with a foul odor  * Bleeding  * Increase in swelling  * Need for compression bandage changes due to slippage, breakthrough drainage. If you need medical attention outside of the business hours of the [address] [place] [address] please contact your PCP or go to the nearest emergency room. The information contained in the After Visit Summary has been reviewed with me, the patient and/or responsible adult, by my health care provider(s). I had the opportunity to ask questions regarding this information.  I have elected to receive;      []After Visit Summary  [x]Comprehensive Discharge Instruction        Patient signature______________________________________Date:________

## 2023-07-10 NOTE — DISCHARGE INSTRUCTIONS
[address] [place] -Phone: [phone] Fax: [phone]    Visit  Discharge Instructions / Physician Orders     DATE: 7/12/2023     Home Care: N/A     SUPPLIES ORDERED THRU: [facility]      Wound Location: Left Medial Pretib     Cleanse with: Keep Dry and Intact     Dressing Orders: Fibracol, Silvercel, 3 Layer Compression     Frequency: Keep Dry and Intact     Additional Orders: Increase protein to diet (meat, cheese, eggs, fish, peanut butter, nuts and beans)  Multivitamin daily  ELEVATE LEGS AS MUCH AS POSSIBLE  11/21/22-Culture taken     Your next appointment with [address] [place] [address] is in 1 week with Dr. Rikki Burnette     (Please note your next appointment above and if you are unable to keep, kindly give a 24 hour notice. Thank you.)  If more than 15 min late we cannot guarantee you will be seen due to clinician schedule  Per Policy, Excessive cancellation will call for dismissal from program.     If you experience any of the following, please call the [address] [place] [address] during business hours:  [phone]  Your Phone call may be forwarded to Freddie Pascual during business hours that [facility] is closed. * Increase in Pain  * Temperature over 101  * Increase in drainage from your wound  * Drainage with a foul odor  * Bleeding  * Increase in swelling  * Need for compression bandage changes due to slippage, breakthrough drainage. If you need medical attention outside of the business hours of the [address] [place] [address] please contact your PCP or go to the nearest emergency room. The information contained in the After Visit Summary has been reviewed with me, the patient and/or responsible adult, by my health care provider(s). I had the opportunity to ask questions regarding this information.  I have elected to receive;      []After Visit Summary  [x]Comprehensive Discharge Instruction        Patient signature______________________________________Date:________  Electronically

## 2023-07-11 ENCOUNTER — HOSPITAL ENCOUNTER (OUTPATIENT)
Dept: SLEEP CENTER | Age: 88
Discharge: HOME | End: 2023-07-13
Payer: MEDICARE

## 2023-07-11 DIAGNOSIS — G47.10 HYPERSOMNIA, UNSPECIFIED: ICD-10-CM

## 2023-07-11 DIAGNOSIS — R06.83 SNORING: ICD-10-CM

## 2023-07-11 PROCEDURE — G0399 HOME SLEEP TEST/TYPE 3 PORTA: HCPCS

## 2023-07-12 ENCOUNTER — HOSPITAL ENCOUNTER (OUTPATIENT)
Dept: WOUND CARE | Age: 88
Discharge: HOME | End: 2023-07-12

## 2023-07-12 ENCOUNTER — OFFICE VISIT (OUTPATIENT)
Dept: FAMILY MEDICINE CLINIC | Age: 88
End: 2023-07-12
Payer: MEDICARE

## 2023-07-12 ENCOUNTER — HOSPITAL ENCOUNTER (OUTPATIENT)
Dept: WOUND CARE | Age: 88
Discharge: HOME | End: 2023-07-12
Payer: MEDICARE

## 2023-07-12 ENCOUNTER — TELEPHONE (OUTPATIENT)
Dept: FAMILY MEDICINE CLINIC | Age: 88
End: 2023-07-12

## 2023-07-12 VITALS
WEIGHT: 133 LBS | SYSTOLIC BLOOD PRESSURE: 94 MMHG | TEMPERATURE: 96.1 F | RESPIRATION RATE: 19 BRPM | HEIGHT: 60 IN | DIASTOLIC BLOOD PRESSURE: 51 MMHG | BODY MASS INDEX: 26.11 KG/M2 | HEART RATE: 85 BPM

## 2023-07-12 VITALS
DIASTOLIC BLOOD PRESSURE: 80 MMHG | SYSTOLIC BLOOD PRESSURE: 122 MMHG | OXYGEN SATURATION: 90 % | WEIGHT: 133.4 LBS | HEIGHT: 60 IN | HEART RATE: 80 BPM | BODY MASS INDEX: 26.19 KG/M2 | TEMPERATURE: 96.7 F

## 2023-07-12 DIAGNOSIS — S81.802A OPEN WOUND OF LEFT LOWER LEG, INITIAL ENCOUNTER: Primary | ICD-10-CM

## 2023-07-12 DIAGNOSIS — E03.9 ACQUIRED HYPOTHYROIDISM: ICD-10-CM

## 2023-07-12 DIAGNOSIS — L97.322 VENOUS STASIS ULCER OF LEFT ANKLE WITH FAT LAYER EXPOSED WITH VARICOSE VEINS (HCC): ICD-10-CM

## 2023-07-12 DIAGNOSIS — Z00.00 MEDICARE ANNUAL WELLNESS VISIT, SUBSEQUENT: Primary | ICD-10-CM

## 2023-07-12 DIAGNOSIS — I83.023 VENOUS STASIS ULCER OF LEFT ANKLE WITH FAT LAYER EXPOSED WITH VARICOSE VEINS (HCC): ICD-10-CM

## 2023-07-12 PROCEDURE — 3079F DIAST BP 80-89 MM HG: CPT | Performed by: NURSE PRACTITIONER

## 2023-07-12 PROCEDURE — 1123F ACP DISCUSS/DSCN MKR DOCD: CPT | Performed by: NURSE PRACTITIONER

## 2023-07-12 PROCEDURE — 3074F SYST BP LT 130 MM HG: CPT | Performed by: NURSE PRACTITIONER

## 2023-07-12 PROCEDURE — 11042 DBRDMT SUBQ TIS 1ST 20SQCM/<: CPT

## 2023-07-12 PROCEDURE — G0439 PPPS, SUBSEQ VISIT: HCPCS | Performed by: NURSE PRACTITIONER

## 2023-07-12 RX ORDER — LIDOCAINE HYDROCHLORIDE 20 MG/ML
JELLY TOPICAL ONCE
OUTPATIENT
Start: 2023-07-12 | End: 2023-07-12

## 2023-07-12 RX ORDER — LIDOCAINE HYDROCHLORIDE 40 MG/ML
SOLUTION TOPICAL ONCE
OUTPATIENT
Start: 2023-07-12 | End: 2023-07-12

## 2023-07-12 RX ORDER — LIDOCAINE HYDROCHLORIDE 20 MG/ML
JELLY TOPICAL ONCE
Status: COMPLETED | OUTPATIENT
Start: 2023-07-12 | End: 2023-07-12

## 2023-07-12 RX ADMIN — LIDOCAINE HYDROCHLORIDE 6 ML: 20 JELLY TOPICAL at 15:07

## 2023-07-12 ASSESSMENT — PATIENT HEALTH QUESTIONNAIRE - PHQ9
2. FEELING DOWN, DEPRESSED OR HOPELESS: 0
SUM OF ALL RESPONSES TO PHQ QUESTIONS 1-9: 0
SUM OF ALL RESPONSES TO PHQ9 QUESTIONS 1 & 2: 0
1. LITTLE INTEREST OR PLEASURE IN DOING THINGS: 0

## 2023-07-12 NOTE — PROGRESS NOTES
Multilayer Compression Wrap   (Not Unna) Below the Knee    NAME:  Kranthi Hartmann  YOB: 1935  MEDICAL RECORD NUMBER:  [id number]  DATE:  7/12/2023       [x] Removed old Multilayer wrap if indicated and wash leg with mild soap/water. [x] Applied moisturizing agent to dry skin as needed. [x] Applied primary and secondary dressing as ordered   [x] Applied multilayered dressing below the knee to Left lower leg(s). [x] Instructed patient/caregiver not to remove dressing and to keep it clean and dry. [x] Instructed patient/caregiver on complications to report to provider, such as pain, numbness in toes, heavy drainage, and slippage of dressing. [x] Instructed patient on purpose of compression dressing and on activity and exercise recommendations.     Electronically signed by Chase Mccall RN on 7/12/2023 at 4:15 PM

## 2023-07-12 NOTE — PROGRESS NOTES
[facility]   Progress Note and Procedure Note      Arslan Rogel RECORD NUMBER:  [id number]  AGE: 82 y.o. GENDER: female  : 1935  EPISODE DATE:  2023    Subjective:     Chief Complaint   Patient presents with    Wound Check     Left lower leg         HISTORY of PRESENT ILLNESS HPI     Brock Merlin is a 82 y.o. female who presents today for wound/ulcer evaluation. History of Wound Context: Recent CHF exacerbation requiring hospitalization. Legs were swollen but now she is better controlled and they are normal size. Has left pretibial venous ulcer. Wound/Ulcer Pain Timing/Severity: intermittent  Quality of pain: sharp  Severity:  2 / 10   Modifying Factors: None  Associated Signs/Symptoms: edema          PAST MEDICAL HISTORY        Diagnosis Date    Bowel obstruction (HCC)     Broken hip (HCC)     Diverticulitis     DVT (deep venous thrombosis) (HCC)     Heart attack (HCC)     MRSA (methicillin resistant staph aureus) culture positive        PAST SURGICAL HISTORY    Past Surgical History:   Procedure Laterality Date    TOTAL HIP ARTHROPLASTY         FAMILY HISTORY    Family History   Problem Relation Age of Onset    Heart Attack Mother     COPD Father        SOCIAL HISTORY    Social History     Tobacco Use    Smoking status: Never    Smokeless tobacco: Never   Substance Use Topics    Alcohol use:  Yes     Alcohol/week: 3.0 standard drinks     Types: 3 Glasses of wine per week    Drug use: Never       ALLERGIES    Allergies   Allergen Reactions    Amiodarone Rash       MEDICATIONS    Current Outpatient Medications on File Prior to Encounter   Medication Sig Dispense Refill    midodrine (PROAMATINE) 5 MG tablet Take 1 tablet by mouth See Admin Instructions Take 1 tab with breakfast and again at 1pm each day 60 tablet 0    metoprolol succinate (TOPROL XL) 50 MG extended release tablet Take 1 tablet by mouth daily 30 tablet 3    bumetanide (BUMEX) 2 MG tablet

## 2023-07-12 NOTE — PROGRESS NOTES
Medicare Annual Wellness Visit    Doris Ingram is here for Medicare AWV and Rash (On face for a couple days, starting to get better, did try benadryl cream but made it worse)    Assessment & Plan   Medicare annual wellness visit, subsequent  Patient doing very well overall since recent hospitalization. She is completing daily weights and monitoring her blood pressure daily. She lives with her daughter and family is keeping a close eye on her. States she does also have home care and home physical therapy currently. She continues with cardiology at [facility]. She is stable on all medications with no side effects. Acquired hypothyroidism  This is a recent new diagnosis from hospitalization. Will have her continue 50 mcg daily for now on an empty stomach and have her recheck labs in another 3 to 4 weeks and will call with test results. Lab Results   Component Value Date    TSH 11.11 (H) 06/04/2023       -     TSH; Future  -     T4, Free; Future  Recommendations for Preventive Services Due: see orders and patient instructions/AVS.  Recommended screening schedule for the next 5-10 years is provided to the patient in written form: see Patient Instructions/AVS.     No follow-ups on file. Subjective   Patient here today for Medicare wellness visit. Was seen recently by my partner Юлия Mishra for hospital follow-up after congestive heart failure, hypotension and new diagnosis of hypothyroid. States she was recently diagnosed with A-fib this past spring. States she is stable on all medications and feeling much better. She is using a rolling walker when ambulating. Stable at home with home care and supportive family. Denies chest pain, shortness of breath, dizziness, weakness. Only gets shortness of breath at times with A-fib. Please note that this chart was generated using voice recognition Dragon dictation software.   Although every effort was made to ensure the accuracy of this automated

## 2023-07-12 NOTE — TELEPHONE ENCOUNTER
Vale Lee from [facility] contacted the office and states that the patient had a fall. Patient was asleep and woke up when she fell. Patient has a bruise on her arm. No other injury.

## 2023-07-12 NOTE — PROGRESS NOTES
Visit Information    Have you changed or started any medications since your last visit including any over-the-counter medicines, vitamins, or herbal medicines? no   Have you stopped taking any of your medications? Is so, why? -  no  Are you having any side effects from any of your medications? - no    Have you seen any other physician or provider since your last visit?  no   Have you had any other diagnostic tests since your last visit?  no   Have you been seen in the emergency room and/or had an admission in a hospital since we last saw you?  no   Have you had your routine dental cleaning in the past 6 months?  no     Do you have an active MyChart account? If no, what is the barrier?   Yes    Patient Care Team:  FLORIAN Henriquez CNP as PCP - General (Family Nurse Practitioner)  FLORIAN Henriquez CNP as PCP - Empaneled Provider    Medical History Review  Past Medical, Family, and Social History reviewed and  contribute to the patient presenting condition    Health Maintenance   Topic Date Due    DEXA (modify frequency per FRAX score)  Never done    COVID-19 Vaccine (3 - Booster for Snapstream series) 04/01/2021    Shingles vaccine (3 of 3) 04/07/2022    Annual Wellness Visit (AWV)  06/24/2023    Flu vaccine (1) 08/01/2023    Lipids  06/04/2024    Depression Screen  06/26/2024    DTaP/Tdap/Td vaccine (2 - Td or Tdap) 10/31/2026    Pneumococcal 65+ years Vaccine  Completed    Hepatitis A vaccine  Aged Out    Hib vaccine  Aged Out    Meningococcal (ACWY) vaccine  Aged Out

## 2023-07-19 ENCOUNTER — HOSPITAL ENCOUNTER (OUTPATIENT)
Dept: WOUND CARE | Age: 88
Discharge: HOME | End: 2023-07-19
Payer: MEDICARE

## 2023-07-19 VITALS
SYSTOLIC BLOOD PRESSURE: 105 MMHG | RESPIRATION RATE: 17 BRPM | HEART RATE: 87 BPM | BODY MASS INDEX: 25.97 KG/M2 | DIASTOLIC BLOOD PRESSURE: 57 MMHG | TEMPERATURE: 96.3 F | WEIGHT: 133 LBS

## 2023-07-19 DIAGNOSIS — S81.802A OPEN WOUND OF LEFT LOWER LEG, INITIAL ENCOUNTER: Primary | ICD-10-CM

## 2023-07-19 PROCEDURE — 29581 APPL MULTLAYER CMPRN SYS LEG: CPT

## 2023-07-19 PROCEDURE — 99212 OFFICE O/P EST SF 10 MIN: CPT | Performed by: STUDENT IN AN ORGANIZED HEALTH CARE EDUCATION/TRAINING PROGRAM

## 2023-07-19 RX ORDER — LIDOCAINE HYDROCHLORIDE 20 MG/ML
JELLY TOPICAL ONCE
OUTPATIENT
Start: 2023-07-19 | End: 2023-07-19

## 2023-07-19 RX ORDER — LIDOCAINE HYDROCHLORIDE 40 MG/ML
SOLUTION TOPICAL ONCE
OUTPATIENT
Start: 2023-07-19 | End: 2023-07-19

## 2023-07-19 RX ORDER — LIDOCAINE HYDROCHLORIDE 20 MG/ML
JELLY TOPICAL ONCE
Status: DISCONTINUED | OUTPATIENT
Start: 2023-07-19 | End: 2023-07-20 | Stop reason: HOSPADM

## 2023-07-19 NOTE — PLAN OF CARE
Problem: Discharge Planning  Goal: Discharge to home or other facility with appropriate resources  Outcome: Progressing     Problem: Safety - Adult  Goal: Free from fall injury  Outcome: Progressing     Problem: ABCDS Injury Assessment  Goal: Absence of physical injury  Outcome: Progressing     Problem: Wound:  Goal: Will show signs of wound healing; wound closure and no evidence of infection  Description: Will show signs of wound healing; wound closure and no evidence of infection  Outcome: Progressing

## 2023-07-19 NOTE — PROGRESS NOTES
[address] [place] [address]   Progress Note and Procedure Note      Arslan Rogel RECORD NUMBER:  [id number]  AGE: 82 y.o. GENDER: female  : 1935  EPISODE DATE:  2023    Subjective:     Chief Complaint   Patient presents with    Wound Check     leg         HISTORY of PRESENT ILLNESS HPI    Joseline Grove is a 82 y.o. female who presents today for wound/ulcer evaluation. History of Wound Context: Recent CHF exacerbation requiring hospitalization. Legs were swollen but now she is better controlled and they are normal size. Has left pretibial venous ulcer. Wound/Ulcer Pain Timing/Severity: intermittent  Quality of pain: sharp  Severity:  2 / 10   Modifying Factors: None  Associated Signs/Symptoms: edema     Ulcer Identification:  Ulcer Type: venous  Contributing Factors: edema        PAST MEDICAL HISTORY        Diagnosis Date    Bowel obstruction (HCC)     Broken hip (HCC)     Diverticulitis     DVT (deep venous thrombosis) (HCC)     Heart attack (HCC)     MRSA (methicillin resistant staph aureus) culture positive        PAST SURGICAL HISTORY    Past Surgical History:   Procedure Laterality Date    TOTAL HIP ARTHROPLASTY         FAMILY HISTORY    Family History   Problem Relation Age of Onset    Heart Attack Mother     COPD Father        SOCIAL HISTORY    Social History     Tobacco Use    Smoking status: Never    Smokeless tobacco: Never   Substance Use Topics    Alcohol use:  Yes     Alcohol/week: 3.0 standard drinks     Types: 3 Glasses of wine per week    Drug use: Never       ALLERGIES    Allergies   Allergen Reactions    Amiodarone Rash       MEDICATIONS    Current Outpatient Medications on File Prior to Encounter   Medication Sig Dispense Refill    midodrine (PROAMATINE) 5 MG tablet Take 1 tablet by mouth See Admin Instructions Take 1 tab with breakfast and again at 1pm each day 60 tablet 0    metoprolol succinate (TOPROL XL) 50 MG extended release tablet Take 1 tablet

## 2023-07-19 NOTE — DISCHARGE INSTRUCTIONS
[address] -Phone: [phone] Fax: [phone]    Visit  Discharge Instructions / Physician Orders     DATE: 7/19/2023     Home Care: N/A     SUPPLIES ORDERED THRU: [facility] [facility]      Wound Location: Left Medial Pretib     Cleanse with: Keep Dry and Intact     Dressing Orders: 3 Layer Compression     Frequency: Keep Dry and Intact     Additional Orders: Increase protein to diet (meat, cheese, eggs, fish, peanut butter, nuts and beans)  Multivitamin daily  ELEVATE LEGS AS MUCH AS POSSIBLE  11/21/22-Culture taken     Your next appointment with [address] [place] [address] is in 1 week with Dr. Wali Foster     (Please note your next appointment above and if you are unable to keep, kindly give a 24 hour notice. Thank you.)  If more than 15 min late we cannot guarantee you will be seen due to clinician schedule  Per Policy, Excessive cancellation will call for dismissal from program.     If you experience any of the following, please call the [address] [place] [address] during business hours:  [phone]  Your Phone call may be forwarded to Freddie Pascual during business hours that [address] [place] [address] is closed. * Increase in Pain  * Temperature over 101  * Increase in drainage from your wound  * Drainage with a foul odor  * Bleeding  * Increase in swelling  * Need for compression bandage changes due to slippage, breakthrough drainage. If you need medical attention outside of the business hours of the [address] [place] [address] please contact your PCP or go to the nearest emergency room. The information contained in the After Visit Summary has been reviewed with me, the patient and/or responsible adult, by my health care provider(s). I had the opportunity to ask questions regarding this information.  I have elected to receive;      []After Visit Summary  [x]Comprehensive Discharge Instruction        Patient signature______________________________________Date:________  Electronically signed by Lona Jolley

## 2023-07-19 NOTE — PROGRESS NOTES
Multilayer Compression Wrap   (Not Unna) Below the Knee    NAME:  Marshal Kim  YOB: 1935  MEDICAL RECORD NUMBER:  [id number]  DATE:  7/19/2023    Multilayer compression wrap: Removed old Multilayer wrap if indicated and wash leg with mild soap/water. Applied moisturizing agent to dry skin as needed. Applied primary and secondary dressing as ordered. Applied multilayered dressing below the knee to left lower leg. Instructed patient/caregiver not to remove dressing and to keep it clean and dry. Instructed patient/caregiver on complications to report to provider, such as pain, numbness in toes, heavy drainage, and slippage of dressing. Instructed patient on purpose of compression dressing and on activity and exercise recommendations. 3 layer compression wrap.     Electronically signed by Yousif Martinez RN on 7/19/2023 at 9:33 AM

## 2023-07-21 LAB — STATUS: NORMAL

## 2023-07-24 DIAGNOSIS — G47.33 OSA (OBSTRUCTIVE SLEEP APNEA): Primary | ICD-10-CM

## 2023-07-26 ENCOUNTER — TELEPHONE (OUTPATIENT)
Dept: WOUND CARE | Age: 88
End: 2023-07-26

## 2023-07-26 NOTE — TELEPHONE ENCOUNTER
Patient called this morning to cancel her wound care appointment because her wound is all healed and she doesn't need wound care anymore.  She will call if anything changes

## 2023-08-03 ENCOUNTER — TELEPHONE (OUTPATIENT)
Dept: FAMILY MEDICINE CLINIC | Age: 88
End: 2023-08-03

## 2023-08-03 NOTE — TELEPHONE ENCOUNTER
Prasad Mention with [facility] called to report that the patient's heart rate was 48 and that the patient reported to her that was was falling asleep in the middle of talking to her friend prior to Yulia's visit. Please advise.

## 2023-08-07 ENCOUNTER — APPOINTMENT (OUTPATIENT)
Dept: GENERAL RADIOLOGY | Age: 88
DRG: 280 | End: 2023-08-07
Payer: COMMERCIAL

## 2023-08-07 ENCOUNTER — HOSPITAL ENCOUNTER (INPATIENT)
Age: 88
LOS: 3 days | Discharge: NURSING, REHAB OR LONG-TERM CARE | DRG: 280 | End: 2023-08-10
Attending: EMERGENCY MEDICINE | Admitting: FAMILY MEDICINE
Payer: COMMERCIAL

## 2023-08-07 DIAGNOSIS — R60.0 BILATERAL LEG EDEMA: ICD-10-CM

## 2023-08-07 DIAGNOSIS — M79.89 REDNESS AND SWELLING OF LOWER LEG: ICD-10-CM

## 2023-08-07 DIAGNOSIS — I50.23 ACUTE ON CHRONIC SYSTOLIC CONGESTIVE HEART FAILURE (HCC): Primary | ICD-10-CM

## 2023-08-07 DIAGNOSIS — R23.8 REDNESS AND SWELLING OF LOWER LEG: ICD-10-CM

## 2023-08-07 DIAGNOSIS — I48.11 LONGSTANDING PERSISTENT ATRIAL FIBRILLATION (HCC): ICD-10-CM

## 2023-08-07 PROBLEM — E87.5 HYPERKALEMIA: Status: ACTIVE | Noted: 2023-01-01

## 2023-08-07 PROBLEM — R79.89 ELEVATED TSH: Status: ACTIVE | Noted: 2023-08-07

## 2023-08-07 PROBLEM — N17.9 ACUTE KIDNEY INJURY SUPERIMPOSED ON CHRONIC KIDNEY DISEASE (HCC): Status: ACTIVE | Noted: 2023-08-07

## 2023-08-07 PROBLEM — R79.89 ELEVATED TROPONIN: Status: ACTIVE | Noted: 2023-01-01

## 2023-08-07 PROBLEM — R77.8 ELEVATED TROPONIN: Status: ACTIVE | Noted: 2023-08-07

## 2023-08-07 PROBLEM — I95.9 HYPOTENSION: Status: ACTIVE | Noted: 2023-08-07

## 2023-08-07 PROBLEM — N18.9 ACUTE KIDNEY INJURY SUPERIMPOSED ON CHRONIC KIDNEY DISEASE (HCC): Status: ACTIVE | Noted: 2023-01-01

## 2023-08-07 LAB
ALBUMIN SERPL-MCNC: 3 G/DL (ref 3.5–5.2)
ALP SERPL-CCNC: 294 U/L (ref 35–104)
ALT SERPL-CCNC: 46 U/L (ref 5–33)
ANION GAP SERPL CALCULATED.3IONS-SCNC: 14 MMOL/L (ref 9–17)
AST SERPL-CCNC: 53 U/L
BASOPHILS # BLD: 0.04 K/UL (ref 0–0.2)
BASOPHILS NFR BLD: 1 % (ref 0–2)
BILIRUB SERPL-MCNC: 0.6 MG/DL (ref 0.3–1.2)
BNP SERPL-MCNC: ABNORMAL PG/ML
BUN SERPL-MCNC: 64 MG/DL (ref 8–23)
BUN/CREAT SERPL: 34 (ref 9–20)
CALCIUM SERPL-MCNC: 8.7 MG/DL (ref 8.6–10.4)
CHLORIDE SERPL-SCNC: 101 MMOL/L (ref 98–107)
CO2 SERPL-SCNC: 18 MMOL/L (ref 20–31)
CREAT SERPL-MCNC: 1.9 MG/DL (ref 0.5–0.9)
EOSINOPHIL # BLD: <0.03 K/UL (ref 0–0.44)
EOSINOPHILS RELATIVE PERCENT: 0 % (ref 1–4)
ERYTHROCYTE [DISTWIDTH] IN BLOOD BY AUTOMATED COUNT: 18.9 % (ref 11.8–14.4)
GFR SERPL CREATININE-BSD FRML MDRD: 26 ML/MIN/1.73M2
GLUCOSE SERPL-MCNC: 107 MG/DL (ref 70–99)
HCT VFR BLD AUTO: 45 % (ref 36.3–47.1)
HGB BLD-MCNC: 13.7 G/DL (ref 11.9–15.1)
IMM GRANULOCYTES # BLD AUTO: 0.05 K/UL (ref 0–0.3)
IMM GRANULOCYTES NFR BLD: 1 %
LYMPHOCYTES NFR BLD: 1.43 K/UL (ref 1.1–3.7)
LYMPHOCYTES RELATIVE PERCENT: 18 % (ref 24–43)
MAGNESIUM SERPL-MCNC: 1.9 MG/DL (ref 1.6–2.6)
MCH RBC QN AUTO: 31.7 PG (ref 25.2–33.5)
MCHC RBC AUTO-ENTMCNC: 30.4 G/DL (ref 28.4–34.8)
MCV RBC AUTO: 104.2 FL (ref 82.6–102.9)
MONOCYTES NFR BLD: 0.7 K/UL (ref 0.1–1.2)
MONOCYTES NFR BLD: 9 % (ref 3–12)
NEUTROPHILS NFR BLD: 71 % (ref 36–65)
NEUTS SEG NFR BLD: 5.62 K/UL (ref 1.5–8.1)
NRBC BLD-RTO: 0.6 PER 100 WBC
PLATELET # BLD AUTO: 294 K/UL (ref 138–453)
PMV BLD AUTO: 9 FL (ref 8.1–13.5)
POTASSIUM SERPL-SCNC: 5.4 MMOL/L (ref 3.7–5.3)
PROT SERPL-MCNC: 6.5 G/DL (ref 6.4–8.3)
RBC # BLD AUTO: 4.32 M/UL (ref 3.95–5.11)
RBC # BLD: ABNORMAL 10*6/UL
RBC # BLD: ABNORMAL 10*6/UL
SODIUM SERPL-SCNC: 133 MMOL/L (ref 135–144)
T4 FREE SERPL-MCNC: 1.2 NG/DL (ref 0.9–1.7)
TROPONIN I SERPL HS-MCNC: 58 NG/L (ref 0–14)
TROPONIN I SERPL HS-MCNC: 63 NG/L (ref 0–14)
TSH SERPL DL<=0.05 MIU/L-ACNC: 9.95 UIU/ML (ref 0.3–5)
WBC OTHER # BLD: 7.9 K/UL (ref 3.5–11.3)

## 2023-08-07 PROCEDURE — 99285 EMERGENCY DEPT VISIT HI MDM: CPT

## 2023-08-07 PROCEDURE — 84439 ASSAY OF FREE THYROXINE: CPT

## 2023-08-07 PROCEDURE — 71045 X-RAY EXAM CHEST 1 VIEW: CPT

## 2023-08-07 PROCEDURE — 83880 ASSAY OF NATRIURETIC PEPTIDE: CPT

## 2023-08-07 PROCEDURE — 2500000003 HC RX 250 WO HCPCS: Performed by: NURSE PRACTITIONER

## 2023-08-07 PROCEDURE — 84484 ASSAY OF TROPONIN QUANT: CPT

## 2023-08-07 PROCEDURE — 6360000002 HC RX W HCPCS: Performed by: NURSE PRACTITIONER

## 2023-08-07 PROCEDURE — 84443 ASSAY THYROID STIM HORMONE: CPT

## 2023-08-07 PROCEDURE — 99223 1ST HOSP IP/OBS HIGH 75: CPT | Performed by: NURSE PRACTITIONER

## 2023-08-07 PROCEDURE — 80053 COMPREHEN METABOLIC PANEL: CPT

## 2023-08-07 PROCEDURE — 83735 ASSAY OF MAGNESIUM: CPT

## 2023-08-07 PROCEDURE — 2060000000 HC ICU INTERMEDIATE R&B

## 2023-08-07 PROCEDURE — 85025 COMPLETE CBC W/AUTO DIFF WBC: CPT

## 2023-08-07 PROCEDURE — 93005 ELECTROCARDIOGRAM TRACING: CPT | Performed by: EMERGENCY MEDICINE

## 2023-08-07 RX ORDER — MIDODRINE HYDROCHLORIDE 5 MG/1
5 TABLET ORAL 2 TIMES DAILY WITH MEALS
Status: DISCONTINUED | OUTPATIENT
Start: 2023-08-07 | End: 2023-08-08

## 2023-08-07 RX ORDER — ATORVASTATIN CALCIUM 80 MG/1
80 TABLET, FILM COATED ORAL DAILY
Status: DISCONTINUED | OUTPATIENT
Start: 2023-08-07 | End: 2023-08-10 | Stop reason: HOSPADM

## 2023-08-07 RX ORDER — LANOLIN ALCOHOL/MO/W.PET/CERES
200 CREAM (GRAM) TOPICAL DAILY
Status: DISCONTINUED | OUTPATIENT
Start: 2023-08-07 | End: 2023-08-10 | Stop reason: HOSPADM

## 2023-08-07 RX ORDER — ONDANSETRON 2 MG/ML
4 INJECTION INTRAMUSCULAR; INTRAVENOUS EVERY 6 HOURS PRN
Status: DISCONTINUED | OUTPATIENT
Start: 2023-08-07 | End: 2023-08-10 | Stop reason: HOSPADM

## 2023-08-07 RX ORDER — M-VIT,TX,IRON,MINS/CALC/FOLIC 27MG-0.4MG
1 TABLET ORAL DAILY
Status: DISCONTINUED | OUTPATIENT
Start: 2023-08-07 | End: 2023-08-10 | Stop reason: HOSPADM

## 2023-08-07 RX ORDER — ONDANSETRON 4 MG/1
4 TABLET, ORALLY DISINTEGRATING ORAL EVERY 8 HOURS PRN
Status: DISCONTINUED | OUTPATIENT
Start: 2023-08-07 | End: 2023-08-10 | Stop reason: HOSPADM

## 2023-08-07 RX ORDER — METOPROLOL SUCCINATE 50 MG/1
50 TABLET, EXTENDED RELEASE ORAL DAILY
Status: DISCONTINUED | OUTPATIENT
Start: 2023-08-07 | End: 2023-08-09

## 2023-08-07 RX ORDER — ACETAMINOPHEN 325 MG/1
650 TABLET ORAL EVERY 6 HOURS PRN
Status: DISCONTINUED | OUTPATIENT
Start: 2023-08-07 | End: 2023-08-10 | Stop reason: HOSPADM

## 2023-08-07 RX ORDER — LATANOPROST 50 UG/ML
1 SOLUTION/ DROPS OPHTHALMIC NIGHTLY
Status: DISCONTINUED | OUTPATIENT
Start: 2023-08-07 | End: 2023-08-10 | Stop reason: HOSPADM

## 2023-08-07 RX ORDER — BUMETANIDE 0.25 MG/ML
0.5 INJECTION INTRAMUSCULAR; INTRAVENOUS DAILY
Status: DISCONTINUED | OUTPATIENT
Start: 2023-08-08 | End: 2023-08-08

## 2023-08-07 RX ORDER — BRIMONIDINE TARTRATE 2 MG/ML
1 SOLUTION/ DROPS OPHTHALMIC 2 TIMES DAILY
Status: DISCONTINUED | OUTPATIENT
Start: 2023-08-07 | End: 2023-08-10 | Stop reason: HOSPADM

## 2023-08-07 RX ORDER — ACETAMINOPHEN 650 MG/1
650 SUPPOSITORY RECTAL EVERY 6 HOURS PRN
Status: DISCONTINUED | OUTPATIENT
Start: 2023-08-07 | End: 2023-08-10 | Stop reason: HOSPADM

## 2023-08-07 RX ORDER — PANTOPRAZOLE SODIUM 40 MG/1
40 TABLET, DELAYED RELEASE ORAL
Status: DISCONTINUED | OUTPATIENT
Start: 2023-08-08 | End: 2023-08-10 | Stop reason: HOSPADM

## 2023-08-07 RX ORDER — BUMETANIDE 0.25 MG/ML
2 INJECTION INTRAMUSCULAR; INTRAVENOUS ONCE
Status: COMPLETED | OUTPATIENT
Start: 2023-08-07 | End: 2023-08-07

## 2023-08-07 RX ORDER — LEVOTHYROXINE SODIUM 0.05 MG/1
50 TABLET ORAL DAILY
Status: DISCONTINUED | OUTPATIENT
Start: 2023-08-08 | End: 2023-08-10 | Stop reason: HOSPADM

## 2023-08-07 RX ORDER — BUMETANIDE 1 MG/1
2 TABLET ORAL DAILY
Status: CANCELLED | OUTPATIENT
Start: 2023-08-07

## 2023-08-07 RX ORDER — DIGOXIN 0.25 MG/ML
250 INJECTION INTRAMUSCULAR; INTRAVENOUS ONCE
Status: COMPLETED | OUTPATIENT
Start: 2023-08-07 | End: 2023-08-07

## 2023-08-07 RX ORDER — ASPIRIN 81 MG/1
81 TABLET ORAL DAILY
Status: DISCONTINUED | OUTPATIENT
Start: 2023-08-07 | End: 2023-08-10 | Stop reason: HOSPADM

## 2023-08-07 RX ORDER — SENNOSIDES A AND B 8.6 MG/1
1 TABLET, FILM COATED ORAL DAILY PRN
Status: DISCONTINUED | OUTPATIENT
Start: 2023-08-07 | End: 2023-08-10 | Stop reason: HOSPADM

## 2023-08-07 RX ADMIN — BUMETANIDE 2 MG: 0.25 INJECTION INTRAMUSCULAR; INTRAVENOUS at 17:49

## 2023-08-07 RX ADMIN — DIGOXIN 250 MCG: 0.25 INJECTION INTRAMUSCULAR; INTRAVENOUS at 17:49

## 2023-08-07 ASSESSMENT — ENCOUNTER SYMPTOMS
ABDOMINAL PAIN: 0
COLOR CHANGE: 1
SHORTNESS OF BREATH: 1
NAUSEA: 0
CHEST TIGHTNESS: 0
CONSTIPATION: 0
WHEEZING: 0
DIARRHEA: 0
BACK PAIN: 0
COUGH: 0
VOMITING: 0

## 2023-08-07 ASSESSMENT — PAIN DESCRIPTION - ORIENTATION
ORIENTATION: RIGHT;LEFT
ORIENTATION: RIGHT;LEFT

## 2023-08-07 ASSESSMENT — PAIN SCALES - GENERAL
PAINLEVEL_OUTOF10: 8
PAINLEVEL_OUTOF10: 8

## 2023-08-07 ASSESSMENT — PAIN DESCRIPTION - DESCRIPTORS
DESCRIPTORS: ACHING
DESCRIPTORS: ACHING

## 2023-08-07 ASSESSMENT — PAIN DESCRIPTION - LOCATION
LOCATION: LEG
LOCATION: GENERALIZED

## 2023-08-07 NOTE — ED NOTES
No Who:    []  Other    Completed  [] yes [] No Who:  Interventions: left ac 20 gauge, labs, digoxin, bumex  Important Events:         Electronically signed by Sanjay Owen RN on 8/7/2023 at 7:22 PM       Sanjay Owen RN  08/07/23 1924

## 2023-08-08 ENCOUNTER — APPOINTMENT (OUTPATIENT)
Dept: GENERAL RADIOLOGY | Age: 88
DRG: 280 | End: 2023-08-08
Payer: COMMERCIAL

## 2023-08-08 ENCOUNTER — APPOINTMENT (OUTPATIENT)
Dept: VASCULAR LAB | Age: 88
DRG: 280 | End: 2023-08-08
Payer: COMMERCIAL

## 2023-08-08 ENCOUNTER — APPOINTMENT (OUTPATIENT)
Dept: ULTRASOUND IMAGING | Age: 88
DRG: 280 | End: 2023-08-08
Payer: COMMERCIAL

## 2023-08-08 LAB
ANION GAP SERPL CALCULATED.3IONS-SCNC: 14 MMOL/L (ref 9–17)
BACTERIA URNS QL MICRO: ABNORMAL
BASOPHILS # BLD: 0.05 K/UL (ref 0–0.2)
BASOPHILS NFR BLD: 1 % (ref 0–2)
BILIRUB UR QL STRIP: NEGATIVE
BNP SERPL-MCNC: ABNORMAL PG/ML
BUN SERPL-MCNC: 59 MG/DL (ref 8–23)
BUN/CREAT SERPL: 39 (ref 9–20)
CALCIUM SERPL-MCNC: 8.5 MG/DL (ref 8.6–10.4)
CHLORIDE SERPL-SCNC: 101 MMOL/L (ref 98–107)
CHOLEST SERPL-MCNC: 74 MG/DL
CHOLESTEROL/HDL RATIO: 1.8
CLARITY UR: CLEAR
CO2 SERPL-SCNC: 21 MMOL/L (ref 20–31)
COLOR UR: YELLOW
CREAT SERPL-MCNC: 1.5 MG/DL (ref 0.5–0.9)
CREAT UR-MCNC: 17.1 MG/DL (ref 28–217)
ECHO BSA: 1.59 M2
EKG ATRIAL RATE: 127 BPM
EKG Q-T INTERVAL: 336 MS
EKG QRS DURATION: 78 MS
EKG QTC CALCULATION (BAZETT): 484 MS
EKG R AXIS: -48 DEGREES
EKG T AXIS: 36 DEGREES
EKG VENTRICULAR RATE: 125 BPM
EOSINOPHIL # BLD: 0.03 K/UL (ref 0–0.44)
EOSINOPHILS RELATIVE PERCENT: 0 % (ref 1–4)
EPI CELLS #/AREA URNS HPF: ABNORMAL /HPF (ref 0–5)
ERYTHROCYTE [DISTWIDTH] IN BLOOD BY AUTOMATED COUNT: 18.9 % (ref 11.8–14.4)
ERYTHROCYTE [SEDIMENTATION RATE] IN BLOOD BY PHOTOMETRIC METHOD: 36 MM/HR (ref 0–30)
GFR SERPL CREATININE-BSD FRML MDRD: 34 ML/MIN/1.73M2
GLUCOSE SERPL-MCNC: 102 MG/DL (ref 70–99)
GLUCOSE UR STRIP-MCNC: NEGATIVE MG/DL
HCT VFR BLD AUTO: 42.5 % (ref 36.3–47.1)
HDLC SERPL-MCNC: 41 MG/DL
HGB BLD-MCNC: 13.1 G/DL (ref 11.9–15.1)
HGB UR QL STRIP.AUTO: ABNORMAL
IMM GRANULOCYTES # BLD AUTO: 0.03 K/UL (ref 0–0.3)
IMM GRANULOCYTES NFR BLD: 0 %
KETONES UR STRIP-MCNC: NEGATIVE MG/DL
LDLC SERPL CALC-MCNC: 16 MG/DL (ref 0–130)
LEUKOCYTE ESTERASE UR QL STRIP: NEGATIVE
LYMPHOCYTES NFR BLD: 1.1 K/UL (ref 1.1–3.7)
LYMPHOCYTES RELATIVE PERCENT: 15 % (ref 24–43)
MCH RBC QN AUTO: 32 PG (ref 25.2–33.5)
MCHC RBC AUTO-ENTMCNC: 30.8 G/DL (ref 28.4–34.8)
MCV RBC AUTO: 103.7 FL (ref 82.6–102.9)
MONOCYTES NFR BLD: 0.63 K/UL (ref 0.1–1.2)
MONOCYTES NFR BLD: 9 % (ref 3–12)
NEUTROPHILS NFR BLD: 75 % (ref 36–65)
NEUTS SEG NFR BLD: 5.5 K/UL (ref 1.5–8.1)
NITRITE UR QL STRIP: NEGATIVE
NRBC BLD-RTO: 0.4 PER 100 WBC
OSMOLALITY UR: 324 MOSM/KG (ref 80–1300)
PH UR STRIP: 5.5 [PH] (ref 5–8)
PLATELET # BLD AUTO: 272 K/UL (ref 138–453)
PMV BLD AUTO: 9.3 FL (ref 8.1–13.5)
POTASSIUM SERPL-SCNC: 4.1 MMOL/L (ref 3.7–5.3)
PROT UR STRIP-MCNC: NEGATIVE MG/DL
RBC # BLD AUTO: 4.1 M/UL (ref 3.95–5.11)
RBC # BLD: ABNORMAL 10*6/UL
RBC # BLD: ABNORMAL 10*6/UL
RBC #/AREA URNS HPF: ABNORMAL /HPF (ref 0–2)
SODIUM SERPL-SCNC: 136 MMOL/L (ref 135–144)
SODIUM UR-SCNC: 79 MMOL/L
SP GR UR STRIP: 1.01 (ref 1–1.03)
TOTAL PROTEIN, URINE: 6 MG/DL
TRIGL SERPL-MCNC: 84 MG/DL
TSH SERPL DL<=0.05 MIU/L-ACNC: 8.65 UIU/ML (ref 0.3–5)
UROBILINOGEN UR STRIP-ACNC: NORMAL EU/DL (ref 0–1)
WBC #/AREA URNS HPF: ABNORMAL /HPF (ref 0–5)
WBC OTHER # BLD: 7.3 K/UL (ref 3.5–11.3)

## 2023-08-08 PROCEDURE — 85652 RBC SED RATE AUTOMATED: CPT

## 2023-08-08 PROCEDURE — 80048 BASIC METABOLIC PNL TOTAL CA: CPT

## 2023-08-08 PROCEDURE — 6360000002 HC RX W HCPCS: Performed by: FAMILY MEDICINE

## 2023-08-08 PROCEDURE — 2500000003 HC RX 250 WO HCPCS: Performed by: NURSE PRACTITIONER

## 2023-08-08 PROCEDURE — 82570 ASSAY OF URINE CREATININE: CPT

## 2023-08-08 PROCEDURE — 6370000000 HC RX 637 (ALT 250 FOR IP): Performed by: INTERNAL MEDICINE

## 2023-08-08 PROCEDURE — 2060000000 HC ICU INTERMEDIATE R&B

## 2023-08-08 PROCEDURE — 6370000000 HC RX 637 (ALT 250 FOR IP): Performed by: NURSE PRACTITIONER

## 2023-08-08 PROCEDURE — 2580000003 HC RX 258: Performed by: FAMILY MEDICINE

## 2023-08-08 PROCEDURE — 2500000003 HC RX 250 WO HCPCS: Performed by: INTERNAL MEDICINE

## 2023-08-08 PROCEDURE — 84156 ASSAY OF PROTEIN URINE: CPT

## 2023-08-08 PROCEDURE — 93970 EXTREMITY STUDY: CPT

## 2023-08-08 PROCEDURE — 99232 SBSQ HOSP IP/OBS MODERATE 35: CPT | Performed by: FAMILY MEDICINE

## 2023-08-08 PROCEDURE — 97167 OT EVAL HIGH COMPLEX 60 MIN: CPT

## 2023-08-08 PROCEDURE — 80061 LIPID PANEL: CPT

## 2023-08-08 PROCEDURE — 81001 URINALYSIS AUTO W/SCOPE: CPT

## 2023-08-08 PROCEDURE — 71045 X-RAY EXAM CHEST 1 VIEW: CPT

## 2023-08-08 PROCEDURE — 36415 COLL VENOUS BLD VENIPUNCTURE: CPT

## 2023-08-08 PROCEDURE — 97530 THERAPEUTIC ACTIVITIES: CPT

## 2023-08-08 PROCEDURE — 6370000000 HC RX 637 (ALT 250 FOR IP): Performed by: FAMILY MEDICINE

## 2023-08-08 PROCEDURE — 6360000002 HC RX W HCPCS: Performed by: NURSE PRACTITIONER

## 2023-08-08 PROCEDURE — 6370000000 HC RX 637 (ALT 250 FOR IP): Performed by: STUDENT IN AN ORGANIZED HEALTH CARE EDUCATION/TRAINING PROGRAM

## 2023-08-08 PROCEDURE — 97535 SELF CARE MNGMENT TRAINING: CPT

## 2023-08-08 PROCEDURE — 83935 ASSAY OF URINE OSMOLALITY: CPT

## 2023-08-08 PROCEDURE — 97116 GAIT TRAINING THERAPY: CPT

## 2023-08-08 PROCEDURE — 84443 ASSAY THYROID STIM HORMONE: CPT

## 2023-08-08 PROCEDURE — 93970 EXTREMITY STUDY: CPT | Performed by: SURGERY

## 2023-08-08 PROCEDURE — 83880 ASSAY OF NATRIURETIC PEPTIDE: CPT

## 2023-08-08 PROCEDURE — 85025 COMPLETE CBC W/AUTO DIFF WBC: CPT

## 2023-08-08 PROCEDURE — 97163 PT EVAL HIGH COMPLEX 45 MIN: CPT

## 2023-08-08 PROCEDURE — 2580000003 HC RX 258: Performed by: NURSE PRACTITIONER

## 2023-08-08 PROCEDURE — 97112 NEUROMUSCULAR REEDUCATION: CPT

## 2023-08-08 PROCEDURE — 84300 ASSAY OF URINE SODIUM: CPT

## 2023-08-08 RX ORDER — OXYCODONE HYDROCHLORIDE 5 MG/1
10 TABLET ORAL EVERY 4 HOURS PRN
Status: DISCONTINUED | OUTPATIENT
Start: 2023-08-08 | End: 2023-08-10 | Stop reason: HOSPADM

## 2023-08-08 RX ORDER — DOCUSATE SODIUM 100 MG/1
100 CAPSULE, LIQUID FILLED ORAL 2 TIMES DAILY PRN
Status: DISCONTINUED | OUTPATIENT
Start: 2023-08-08 | End: 2023-08-10 | Stop reason: HOSPADM

## 2023-08-08 RX ORDER — BUMETANIDE 0.25 MG/ML
1 INJECTION INTRAMUSCULAR; INTRAVENOUS 2 TIMES DAILY
Status: DISCONTINUED | OUTPATIENT
Start: 2023-08-08 | End: 2023-08-09

## 2023-08-08 RX ORDER — IPRATROPIUM BROMIDE AND ALBUTEROL SULFATE 2.5; .5 MG/3ML; MG/3ML
1 SOLUTION RESPIRATORY (INHALATION) EVERY 4 HOURS PRN
Status: DISCONTINUED | OUTPATIENT
Start: 2023-08-08 | End: 2023-08-08

## 2023-08-08 RX ORDER — POLYETHYLENE GLYCOL 3350 17 G/17G
17 POWDER, FOR SOLUTION ORAL DAILY
Status: DISCONTINUED | OUTPATIENT
Start: 2023-08-08 | End: 2023-08-10 | Stop reason: HOSPADM

## 2023-08-08 RX ORDER — MIDODRINE HYDROCHLORIDE 10 MG/1
10 TABLET ORAL 2 TIMES DAILY WITH MEALS
Status: DISCONTINUED | OUTPATIENT
Start: 2023-08-08 | End: 2023-08-09

## 2023-08-08 RX ORDER — SODIUM CHLORIDE 0.9 % (FLUSH) 0.9 %
5-40 SYRINGE (ML) INJECTION 2 TIMES DAILY
Status: DISCONTINUED | OUTPATIENT
Start: 2023-08-08 | End: 2023-08-10 | Stop reason: HOSPADM

## 2023-08-08 RX ORDER — OXYCODONE HYDROCHLORIDE 5 MG/1
5 TABLET ORAL EVERY 4 HOURS PRN
Status: DISCONTINUED | OUTPATIENT
Start: 2023-08-08 | End: 2023-08-10 | Stop reason: HOSPADM

## 2023-08-08 RX ORDER — SODIUM CHLORIDE 9 MG/ML
INJECTION, SOLUTION INTRAVENOUS PRN
Status: ACTIVE | OUTPATIENT
Start: 2023-08-08 | End: 2023-08-08

## 2023-08-08 RX ORDER — ALBUTEROL SULFATE 90 UG/1
2 AEROSOL, METERED RESPIRATORY (INHALATION) EVERY 4 HOURS PRN
Status: DISCONTINUED | OUTPATIENT
Start: 2023-08-08 | End: 2023-08-10 | Stop reason: HOSPADM

## 2023-08-08 RX ORDER — MORPHINE SULFATE 2 MG/ML
2 INJECTION, SOLUTION INTRAMUSCULAR; INTRAVENOUS EVERY 4 HOURS PRN
Status: DISCONTINUED | OUTPATIENT
Start: 2023-08-08 | End: 2023-08-10

## 2023-08-08 RX ADMIN — APIXABAN 2.5 MG: 2.5 TABLET, FILM COATED ORAL at 09:14

## 2023-08-08 RX ADMIN — METOPROLOL SUCCINATE 50 MG: 50 TABLET, EXTENDED RELEASE ORAL at 09:14

## 2023-08-08 RX ADMIN — SODIUM CHLORIDE: 9 INJECTION, SOLUTION INTRAVENOUS at 14:05

## 2023-08-08 RX ADMIN — ACETAMINOPHEN 650 MG: 325 TABLET ORAL at 00:42

## 2023-08-08 RX ADMIN — SODIUM CHLORIDE, PRESERVATIVE FREE 10 ML: 5 INJECTION INTRAVENOUS at 20:10

## 2023-08-08 RX ADMIN — Medication 200 MG: at 09:14

## 2023-08-08 RX ADMIN — MIDODRINE HYDROCHLORIDE 5 MG: 5 TABLET ORAL at 09:14

## 2023-08-08 RX ADMIN — MIDODRINE HYDROCHLORIDE 10 MG: 10 TABLET ORAL at 18:03

## 2023-08-08 RX ADMIN — EMPAGLIFLOZIN 10 MG: 10 TABLET, FILM COATED ORAL at 20:09

## 2023-08-08 RX ADMIN — MORPHINE SULFATE 2 MG: 2 INJECTION, SOLUTION INTRAMUSCULAR; INTRAVENOUS at 02:50

## 2023-08-08 RX ADMIN — BUMETANIDE 1 MG: 0.25 INJECTION INTRAMUSCULAR; INTRAVENOUS at 20:09

## 2023-08-08 RX ADMIN — DOCUSATE SODIUM 100 MG: 100 CAPSULE, LIQUID FILLED ORAL at 14:00

## 2023-08-08 RX ADMIN — MORPHINE SULFATE 2 MG: 2 INJECTION, SOLUTION INTRAMUSCULAR; INTRAVENOUS at 14:01

## 2023-08-08 RX ADMIN — MULTIPLE VITAMINS W/ MINERALS TAB 1 TABLET: TAB at 09:14

## 2023-08-08 RX ADMIN — LEVOTHYROXINE SODIUM 50 MCG: 50 TABLET ORAL at 06:08

## 2023-08-08 RX ADMIN — OXYCODONE HYDROCHLORIDE 10 MG: 5 TABLET ORAL at 14:00

## 2023-08-08 RX ADMIN — MORPHINE SULFATE 2 MG: 2 INJECTION, SOLUTION INTRAMUSCULAR; INTRAVENOUS at 09:14

## 2023-08-08 RX ADMIN — PANTOPRAZOLE SODIUM 40 MG: 40 TABLET, DELAYED RELEASE ORAL at 06:08

## 2023-08-08 RX ADMIN — SODIUM CHLORIDE, PRESERVATIVE FREE 10 ML: 5 INJECTION INTRAVENOUS at 01:10

## 2023-08-08 RX ADMIN — LATANOPROST 1 DROP: 50 SOLUTION OPHTHALMIC at 20:10

## 2023-08-08 RX ADMIN — SODIUM CHLORIDE, PRESERVATIVE FREE 10 ML: 5 INJECTION INTRAVENOUS at 09:14

## 2023-08-08 RX ADMIN — ATORVASTATIN CALCIUM 80 MG: 80 TABLET, FILM COATED ORAL at 09:16

## 2023-08-08 RX ADMIN — BRIMONIDINE TARTRATE 1 DROP: 2 SOLUTION OPHTHALMIC at 00:33

## 2023-08-08 RX ADMIN — CEFTRIAXONE SODIUM 1000 MG: 1 INJECTION, POWDER, FOR SOLUTION INTRAMUSCULAR; INTRAVENOUS at 14:05

## 2023-08-08 RX ADMIN — BRIMONIDINE TARTRATE 1 DROP: 2 SOLUTION OPHTHALMIC at 18:03

## 2023-08-08 RX ADMIN — ASPIRIN 81 MG: 81 TABLET, COATED ORAL at 09:13

## 2023-08-08 RX ADMIN — LATANOPROST 1 DROP: 50 SOLUTION OPHTHALMIC at 00:33

## 2023-08-08 RX ADMIN — APIXABAN 2.5 MG: 2.5 TABLET, FILM COATED ORAL at 20:09

## 2023-08-08 RX ADMIN — BUMETANIDE 0.5 MG: 0.25 INJECTION INTRAMUSCULAR; INTRAVENOUS at 09:14

## 2023-08-08 RX ADMIN — SODIUM CHLORIDE, PRESERVATIVE FREE 10 ML: 5 INJECTION INTRAVENOUS at 02:50

## 2023-08-08 ASSESSMENT — PAIN - FUNCTIONAL ASSESSMENT
PAIN_FUNCTIONAL_ASSESSMENT: PREVENTS OR INTERFERES WITH ALL ACTIVE AND SOME PASSIVE ACTIVITIES
PAIN_FUNCTIONAL_ASSESSMENT: PREVENTS OR INTERFERES SOME ACTIVE ACTIVITIES AND ADLS

## 2023-08-08 ASSESSMENT — PAIN DESCRIPTION - PAIN TYPE
TYPE: ACUTE PAIN;CHRONIC PAIN
TYPE: ACUTE PAIN

## 2023-08-08 ASSESSMENT — ENCOUNTER SYMPTOMS
RHINORRHEA: 0
ABDOMINAL PAIN: 0
WHEEZING: 0
NAUSEA: 0
DIARRHEA: 0
BLOOD IN STOOL: 0
VOMITING: 0
SHORTNESS OF BREATH: 0
COUGH: 0
CHEST TIGHTNESS: 0
CONSTIPATION: 0

## 2023-08-08 ASSESSMENT — PAIN DESCRIPTION - ORIENTATION
ORIENTATION: RIGHT;LEFT
ORIENTATION: RIGHT;LEFT

## 2023-08-08 ASSESSMENT — PAIN SCALES - GENERAL
PAINLEVEL_OUTOF10: 3
PAINLEVEL_OUTOF10: 0
PAINLEVEL_OUTOF10: 7
PAINLEVEL_OUTOF10: 7
PAINLEVEL_OUTOF10: 4

## 2023-08-08 ASSESSMENT — PAIN DESCRIPTION - LOCATION
LOCATION: LEG
LOCATION: LEG

## 2023-08-08 ASSESSMENT — PAIN DESCRIPTION - DESCRIPTORS
DESCRIPTORS: THROBBING
DESCRIPTORS: BURNING

## 2023-08-08 ASSESSMENT — PAIN DESCRIPTION - ONSET: ONSET: ON-GOING

## 2023-08-08 ASSESSMENT — PAIN DESCRIPTION - FREQUENCY
FREQUENCY: CONTINUOUS
FREQUENCY: CONTINUOUS

## 2023-08-08 NOTE — ACP (ADVANCE CARE PLANNING)
Advance Care Planning     Advance Care Planning Activator (Inpatient)  Conversation Note      Date of ACP Conversation: 8/8/2023     Conversation Conducted with: Patient with Decision Making Capacity    ACP Activator: Rory Jarrell RN          Health Care Decision Maker:     Current Designated Health Care Decision Maker:     Primary Decision Maker: Parveen Berrios Child - [phone]    Secondary Decision Maker: Rupal Brewer - Child - [phone]  Click here to complete Healthcare Decision Makers including section of the Healthcare Decision Maker Relationship (ie \"Primary\")  Today we documented Decision Maker(s) consistent with Legal Next of Kin hierarchy. Care Preferences    Ventilation: \"If you were in your present state of health and suddenly became very ill and were unable to breathe on your own, what would your preference be about the use of a ventilator (breathing machine) if it were available to you? \"      Would the patient desire the use of ventilator (breathing machine)?: yes    \"If your health worsens and it becomes clear that your chance of recovery is unlikely, what would your preference be about the use of a ventilator (breathing machine) if it were available to you? \"     Would the patient desire the use of ventilator (breathing machine)?: No      Resuscitation  \"CPR works best to restart the heart when there is a sudden event, like a heart attack, in someone who is otherwise healthy. Unfortunately, CPR does not typically restart the heart for people who have serious health conditions or who are very sick. \"    \"In the event your heart stopped as a result of an underlying serious health condition, would you want attempts to be made to restart your heart (answer \"yes\" for attempt to resuscitate) or would you prefer a natural death (answer \"no\" for do not attempt to resuscitate)? \" yes       [] Yes   [] No   Educated Patient / Decision Maker regarding differences between Advance Directives and

## 2023-08-08 NOTE — CARE COORDINATION
Case Management Assessment  Initial Evaluation    Date/Time of Evaluation: 8/8/2023 3:18 PM  Assessment Completed by: Alberta Brooks RN    If patient is discharged prior to next notation, then this note serves as note for discharge by case management. Patient Name: Raul Pulido                   YOB: 1935  Diagnosis: Acute on chronic systolic congestive heart failure ([address]) [I50.23]  Acute kidney injury superimposed on chronic kidney disease ([address]) [N17.9, N18.9]  Redness and swelling of lower leg [M79.89, R23.8]                   Date / Time: 8/7/2023  3:33 PM    Patient Admission Status: Inpatient   Readmission Risk (Low < 19, Mod (19-27), High > 27): Readmission Risk Score: 21.7    Current PCP: FLORIAN Giron CNP  PCP verified by CM? Yes    Chart Reviewed: Yes      History Provided by: Patient  Patient Orientation: Alert and Oriented    Patient Cognition: Alert    Hospitalization in the last 30 days (Readmission):  No    If yes, Readmission Assessment in CM Navigator will be completed. Advance Directives:      Code Status: Full Code   Patient's Primary Decision Maker is: Legal Next of Kin    Primary Decision Maker: Alvaro [facility] - [phone]    Secondary Decision Maker: Rupal Brewer  Child  [phone]    Discharge Planning:    Patient lives with: Children Type of Home: House  Primary Care Giver: Self  Patient Support Systems include: Children   Current Financial resources: None  Current community resources: None  Current services prior to admission: Durable Medical Equipment, Home Care (current with [facility])            Current DME: Oxygen Therapy (Comment), Shower Chair, Walker, Cane (o2 is 2 L at night thru [employer])            Type of Home Care services:  OT, PT    ADLS  Prior functional level: Independent in ADLs/IADLs  Current functional level: Independent in ADLs/IADLs    PT AM-PAC:   /24  OT AM-PAC: 12 /24    Family can provide assistance at DC:  Yes  Would you

## 2023-08-08 NOTE — CONSULTS
Nephrology Consult Note    Reason for Consult: Acute kidney injury, hyponatremia and hyperkalemia  Requesting Physician: Dr. Torres Mckinnon    Chief Complaint: Shortness of breath and lower extremity edema  History Obtained From:  patient    History of Present Illness: This is a 82 y.o. female who presents with shortness of breath and lower extremities edema. The patient was on vacation and she was complaining of lower extremities pain, Doppler was done this morning to rule out DVT. The patient is known to have systolic CHF with ejection fraction of 35 to 40% at home, the patient was on Bumex 2 mg p.o. daily, the patient states that she was taking her Bumex and trying to avoid salt but she was having more lower worsening of her lower extremities edema and shortness of breath. On presentation her blood pressure was low. She was started on midodrine and her blood pressure is better  The patient is known to have chronic kidney disease stage IIIa with a baseline creatinine of 1.1 mg/dL, her creatinine on presentation was 1.9, better today at 1.5. On presentation the patient had hypervolemic hyponatremia at 133, hyperkalemia at 5.4 and acidemia at 18, all improved. Her proBNP was 27,531 down to 20,482. Troponin is down from 63-58.   Other past medical history significant for dyslipidemia, osteoarthritis, coronary artery disease, A-fib  Past Medical History:        Diagnosis Date    Bowel obstruction (HCC)     Broken hip (HCC)     Diverticulitis     DVT (deep venous thrombosis) (HCC)     Heart attack (HCC)     MRSA (methicillin resistant staph aureus) culture positive        Past Surgical History:        Procedure Laterality Date    TOTAL HIP ARTHROPLASTY         Current Medications:    sodium chloride flush 0.9 % injection 5-40 mL, BID  0.9 % sodium chloride infusion, PRN  morphine (PF) injection 2 mg, Q4H PRN  aspirin EC tablet 81 mg, Daily  atorvastatin (LIPITOR) tablet 80 mg, Daily  brimonidine (ALPHAGAN)
daily    Historical Provider, MD   potassium chloride (KLOR-CON M) 10 MEQ extended release tablet TAKE TWO TABLETS BY MOUTH DAILY  Patient taking differently: 1 tablet daily TAKE TWO TABLETS BY MOUTH DAILY 12/13/22   Alecia Overall, APRN - CNP   Multiple Vitamins-Minerals (THERAPEUTIC MULTIVITAMIN-MINERALS) tablet Take 1 tablet by mouth daily    Historical Provider, MD   Magnesium Gluconate 250 MG TABS Take 250 mg by mouth daily    Historical Provider, MD   nitroGLYCERIN (NITROSTAT) 0.4 MG SL tablet Place 1 tablet under the tongue every 5 minutes as needed for Chest pain 3/15/22   Alecia Overall, APRN - CNP   latanoprost (XALATAN) 0.005 % ophthalmic solution Place 1 drop into both eyes daily  Patient taking differently: Place 1 drop into both eyes nightly 9/15/20   Alecia Overall, APRN - CNP   aspirin 81 MG tablet Take 1 tablet by mouth daily    Historical Provider, MD   brimonidine (ALPHAGAN) 0.2 % ophthalmic solution Place 1 drop into the left eye in the morning and 1 drop in the evening.     Historical Provider, MD     Current Medications:    Current Facility-Administered Medications   Medication Dose Route Frequency Provider Last Rate Last Admin    sodium chloride flush 0.9 % injection 5-40 mL  5-40 mL IntraVENous BID FLORIAN Abbott CNP   10 mL at 08/08/23 0250    0.9 % sodium chloride infusion   IntraVENous PRN FLORIAN Abbott CNP        morphine (PF) injection 2 mg  2 mg IntraVENous Q4H PRN FLORIAN Portillo - CNP   2 mg at 08/08/23 0914    bumetanide (BUMEX) injection 1 mg  1 mg IntraVENous BID James Melara MD        midodrine (PROAMATINE) tablet 10 mg  10 mg Oral BID  James Melara MD        aspirin EC tablet 81 mg  81 mg Oral Daily Rhetta Asai, FLORIAN Tello CNP   81 mg at 08/08/23 0913    atorvastatin (LIPITOR) tablet 80 mg  80 mg Oral Daily Ousmanearnold Porter Antonio, FLORIAN - CNP   80 mg at 08/08/23 0916    brimonidine (ALPHAGAN) 0.2 % ophthalmic solution 1 drop  1

## 2023-08-09 ENCOUNTER — APPOINTMENT (OUTPATIENT)
Age: 88
DRG: 280 | End: 2023-08-09
Attending: INTERNAL MEDICINE
Payer: COMMERCIAL

## 2023-08-09 ENCOUNTER — APPOINTMENT (OUTPATIENT)
Dept: ULTRASOUND IMAGING | Age: 88
DRG: 280 | End: 2023-08-09
Payer: COMMERCIAL

## 2023-08-09 PROBLEM — L97.212 SKIN ULCER OF RIGHT CALF WITH FAT LAYER EXPOSED (HCC): Status: ACTIVE | Noted: 2023-08-09

## 2023-08-09 PROBLEM — L97.222 VENOUS STASIS ULCER OF LEFT CALF WITH FAT LAYER EXPOSED WITH VARICOSE VEINS (HCC): Status: ACTIVE | Noted: 2023-01-01

## 2023-08-09 PROBLEM — I83.022 VENOUS STASIS ULCER OF LEFT CALF WITH FAT LAYER EXPOSED WITH VARICOSE VEINS (HCC): Status: ACTIVE | Noted: 2023-08-09

## 2023-08-09 LAB
ANION GAP SERPL CALCULATED.3IONS-SCNC: 12 MMOL/L (ref 9–17)
BUN SERPL-MCNC: 50 MG/DL (ref 8–23)
BUN/CREAT SERPL: 45 (ref 9–20)
CALCIUM SERPL-MCNC: 8.5 MG/DL (ref 8.6–10.4)
CHLORIDE SERPL-SCNC: 96 MMOL/L (ref 98–107)
CO2 SERPL-SCNC: 25 MMOL/L (ref 20–31)
CREAT SERPL-MCNC: 1.1 MG/DL (ref 0.5–0.9)
GFR SERPL CREATININE-BSD FRML MDRD: 49 ML/MIN/1.73M2
GLUCOSE SERPL-MCNC: 116 MG/DL (ref 70–99)
POTASSIUM SERPL-SCNC: 4.1 MMOL/L (ref 3.7–5.3)
SODIUM SERPL-SCNC: 133 MMOL/L (ref 135–144)

## 2023-08-09 PROCEDURE — 2500000003 HC RX 250 WO HCPCS: Performed by: INTERNAL MEDICINE

## 2023-08-09 PROCEDURE — 2060000000 HC ICU INTERMEDIATE R&B

## 2023-08-09 PROCEDURE — 97535 SELF CARE MNGMENT TRAINING: CPT

## 2023-08-09 PROCEDURE — 80048 BASIC METABOLIC PNL TOTAL CA: CPT

## 2023-08-09 PROCEDURE — 2580000003 HC RX 258: Performed by: NURSE PRACTITIONER

## 2023-08-09 PROCEDURE — 97116 GAIT TRAINING THERAPY: CPT

## 2023-08-09 PROCEDURE — 6370000000 HC RX 637 (ALT 250 FOR IP): Performed by: STUDENT IN AN ORGANIZED HEALTH CARE EDUCATION/TRAINING PROGRAM

## 2023-08-09 PROCEDURE — 76770 US EXAM ABDO BACK WALL COMP: CPT

## 2023-08-09 PROCEDURE — 6370000000 HC RX 637 (ALT 250 FOR IP): Performed by: NURSE PRACTITIONER

## 2023-08-09 PROCEDURE — 36415 COLL VENOUS BLD VENIPUNCTURE: CPT

## 2023-08-09 PROCEDURE — 6370000000 HC RX 637 (ALT 250 FOR IP): Performed by: FAMILY MEDICINE

## 2023-08-09 PROCEDURE — 6370000000 HC RX 637 (ALT 250 FOR IP): Performed by: INTERNAL MEDICINE

## 2023-08-09 PROCEDURE — 99232 SBSQ HOSP IP/OBS MODERATE 35: CPT | Performed by: FAMILY MEDICINE

## 2023-08-09 PROCEDURE — 97110 THERAPEUTIC EXERCISES: CPT

## 2023-08-09 PROCEDURE — 93005 ELECTROCARDIOGRAM TRACING: CPT | Performed by: INTERNAL MEDICINE

## 2023-08-09 PROCEDURE — 93306 TTE W/DOPPLER COMPLETE: CPT

## 2023-08-09 RX ORDER — LOSARTAN POTASSIUM 25 MG/1
25 TABLET ORAL DAILY
Qty: 90 TABLET | Refills: 1 | Status: SHIPPED | OUTPATIENT
Start: 2023-08-09

## 2023-08-09 RX ORDER — BUMETANIDE 1 MG/1
1 TABLET ORAL 2 TIMES DAILY
Qty: 30 TABLET | Refills: 3 | Status: SHIPPED | OUTPATIENT
Start: 2023-08-09

## 2023-08-09 RX ORDER — MIDODRINE HYDROCHLORIDE 2.5 MG/1
2.5 TABLET ORAL 2 TIMES DAILY WITH MEALS
Qty: 90 TABLET | Refills: 3 | Status: SHIPPED | OUTPATIENT
Start: 2023-08-09 | End: 2023-08-10 | Stop reason: SDUPTHER

## 2023-08-09 RX ORDER — DOXYCYCLINE 100 MG/1
100 CAPSULE ORAL EVERY 12 HOURS SCHEDULED
Qty: 14 CAPSULE | Refills: 0 | DISCHARGE
Start: 2023-08-09 | End: 2023-08-16

## 2023-08-09 RX ORDER — DOXYCYCLINE 100 MG/1
100 CAPSULE ORAL EVERY 12 HOURS SCHEDULED
Status: DISCONTINUED | OUTPATIENT
Start: 2023-08-09 | End: 2023-08-10 | Stop reason: HOSPADM

## 2023-08-09 RX ORDER — BUMETANIDE 1 MG/1
1 TABLET ORAL 2 TIMES DAILY
Status: DISCONTINUED | OUTPATIENT
Start: 2023-08-09 | End: 2023-08-10 | Stop reason: HOSPADM

## 2023-08-09 RX ORDER — SENNOSIDES A AND B 8.6 MG/1
1 TABLET, FILM COATED ORAL DAILY PRN
DISCHARGE
Start: 2023-08-09 | End: 2023-09-08

## 2023-08-09 RX ORDER — METOPROLOL SUCCINATE 25 MG/1
25 TABLET, EXTENDED RELEASE ORAL DAILY
Status: DISCONTINUED | OUTPATIENT
Start: 2023-08-10 | End: 2023-08-10 | Stop reason: HOSPADM

## 2023-08-09 RX ORDER — METOPROLOL SUCCINATE 25 MG/1
25 TABLET, EXTENDED RELEASE ORAL DAILY
Qty: 30 TABLET | Refills: 3 | Status: SHIPPED | OUTPATIENT
Start: 2023-08-09

## 2023-08-09 RX ORDER — DIPHENHYDRAMINE HCL 25 MG
25 TABLET ORAL EVERY 6 HOURS PRN
Status: DISCONTINUED | OUTPATIENT
Start: 2023-08-09 | End: 2023-08-10 | Stop reason: HOSPADM

## 2023-08-09 RX ORDER — MIDODRINE HYDROCHLORIDE 2.5 MG/1
2.5 TABLET ORAL 2 TIMES DAILY WITH MEALS
Status: DISCONTINUED | OUTPATIENT
Start: 2023-08-09 | End: 2023-08-10 | Stop reason: HOSPADM

## 2023-08-09 RX ORDER — LOSARTAN POTASSIUM 25 MG/1
25 TABLET ORAL DAILY
Status: DISCONTINUED | OUTPATIENT
Start: 2023-08-09 | End: 2023-08-10 | Stop reason: HOSPADM

## 2023-08-09 RX ADMIN — ATORVASTATIN CALCIUM 80 MG: 80 TABLET, FILM COATED ORAL at 08:36

## 2023-08-09 RX ADMIN — BUMETANIDE 1 MG: 1 TABLET ORAL at 17:33

## 2023-08-09 RX ADMIN — DIPHENHYDRAMINE HCL 25 MG: 25 TABLET ORAL at 14:18

## 2023-08-09 RX ADMIN — OXYCODONE HYDROCHLORIDE 5 MG: 5 TABLET ORAL at 04:01

## 2023-08-09 RX ADMIN — METOPROLOL SUCCINATE 50 MG: 50 TABLET, EXTENDED RELEASE ORAL at 08:36

## 2023-08-09 RX ADMIN — MULTIPLE VITAMINS W/ MINERALS TAB 1 TABLET: TAB at 08:36

## 2023-08-09 RX ADMIN — SODIUM CHLORIDE, PRESERVATIVE FREE 10 ML: 5 INJECTION INTRAVENOUS at 08:41

## 2023-08-09 RX ADMIN — LATANOPROST 1 DROP: 50 SOLUTION OPHTHALMIC at 20:30

## 2023-08-09 RX ADMIN — BRIMONIDINE TARTRATE 1 DROP: 2 SOLUTION OPHTHALMIC at 09:46

## 2023-08-09 RX ADMIN — DOXYCYCLINE 100 MG: 100 CAPSULE ORAL at 20:30

## 2023-08-09 RX ADMIN — LOSARTAN POTASSIUM 25 MG: 25 TABLET, FILM COATED ORAL at 14:18

## 2023-08-09 RX ADMIN — SODIUM CHLORIDE, PRESERVATIVE FREE 10 ML: 5 INJECTION INTRAVENOUS at 20:30

## 2023-08-09 RX ADMIN — OXYCODONE HYDROCHLORIDE 5 MG: 5 TABLET ORAL at 09:10

## 2023-08-09 RX ADMIN — OXYCODONE HYDROCHLORIDE 5 MG: 5 TABLET ORAL at 22:45

## 2023-08-09 RX ADMIN — APIXABAN 2.5 MG: 2.5 TABLET, FILM COATED ORAL at 20:30

## 2023-08-09 RX ADMIN — MIDODRINE HYDROCHLORIDE 2.5 MG: 2.5 TABLET ORAL at 17:33

## 2023-08-09 RX ADMIN — BRIMONIDINE TARTRATE 1 DROP: 2 SOLUTION OPHTHALMIC at 17:33

## 2023-08-09 RX ADMIN — EMPAGLIFLOZIN 10 MG: 10 TABLET, FILM COATED ORAL at 08:35

## 2023-08-09 RX ADMIN — APIXABAN 2.5 MG: 2.5 TABLET, FILM COATED ORAL at 08:35

## 2023-08-09 RX ADMIN — POLYETHYLENE GLYCOL 3350 17 G: 17 POWDER, FOR SOLUTION ORAL at 08:36

## 2023-08-09 RX ADMIN — LEVOTHYROXINE SODIUM 50 MCG: 50 TABLET ORAL at 05:20

## 2023-08-09 RX ADMIN — ASPIRIN 81 MG: 81 TABLET, COATED ORAL at 08:36

## 2023-08-09 RX ADMIN — Medication 200 MG: at 08:36

## 2023-08-09 RX ADMIN — BUMETANIDE 1 MG: 0.25 INJECTION INTRAMUSCULAR; INTRAVENOUS at 08:37

## 2023-08-09 RX ADMIN — PANTOPRAZOLE SODIUM 40 MG: 40 TABLET, DELAYED RELEASE ORAL at 05:20

## 2023-08-09 ASSESSMENT — PAIN SCALES - GENERAL
PAINLEVEL_OUTOF10: 6
PAINLEVEL_OUTOF10: 6
PAINLEVEL_OUTOF10: 3
PAINLEVEL_OUTOF10: 2
PAINLEVEL_OUTOF10: 6

## 2023-08-09 ASSESSMENT — PAIN DESCRIPTION - ORIENTATION
ORIENTATION: RIGHT;LEFT
ORIENTATION: RIGHT;LEFT
ORIENTATION: LEFT;RIGHT

## 2023-08-09 ASSESSMENT — ENCOUNTER SYMPTOMS
NAUSEA: 0
ABDOMINAL PAIN: 0
DIARRHEA: 0
COUGH: 0
WHEEZING: 0
CONSTIPATION: 0
RHINORRHEA: 0
CHEST TIGHTNESS: 0
VOMITING: 0
BLOOD IN STOOL: 0
SHORTNESS OF BREATH: 0

## 2023-08-09 ASSESSMENT — PAIN DESCRIPTION - DESCRIPTORS
DESCRIPTORS: ACHING;BURNING
DESCRIPTORS: ACHING;DISCOMFORT

## 2023-08-09 ASSESSMENT — PAIN DESCRIPTION - LOCATION
LOCATION: LEG

## 2023-08-09 NOTE — CARE COORDINATION
Discharge planning    Patient seen by therapy and recommending snf. Patient is agreeable. SNF list given. She is requesting social work to call her daughter Tasneem Holder     Notified ss. Will be precert once accepted.

## 2023-08-09 NOTE — CARE COORDINATION
Discharge Planning:   Informed patient that she was accepted at [facility] and they had auth. Discussed IMM letter with patient. She states that she will be ready to go tomorrow. Patient has hives today and just given benadryl per RN. Sent transport request for 10AM to [facility]. Patient family present and aware of dc. Report can be called to [facility] at [phone].

## 2023-08-09 NOTE — CARE COORDINATION
Discharge Planning:   Contacted patients daughter Conor Owusu. She states that she would like a referral to [facility]. Referral sent.

## 2023-08-10 VITALS
HEART RATE: 97 BPM | WEIGHT: 133.7 LBS | BODY MASS INDEX: 26.25 KG/M2 | OXYGEN SATURATION: 91 % | RESPIRATION RATE: 16 BRPM | TEMPERATURE: 97.7 F | DIASTOLIC BLOOD PRESSURE: 68 MMHG | SYSTOLIC BLOOD PRESSURE: 105 MMHG | HEIGHT: 60 IN

## 2023-08-10 LAB
ANION GAP SERPL CALCULATED.3IONS-SCNC: 12 MMOL/L (ref 9–17)
BNP SERPL-MCNC: ABNORMAL PG/ML
BUN SERPL-MCNC: 44 MG/DL (ref 8–23)
BUN/CREAT SERPL: 44 (ref 9–20)
CALCIUM SERPL-MCNC: 8.3 MG/DL (ref 8.6–10.4)
CHLORIDE SERPL-SCNC: 97 MMOL/L (ref 98–107)
CO2 SERPL-SCNC: 26 MMOL/L (ref 20–31)
CREAT SERPL-MCNC: 1 MG/DL (ref 0.5–0.9)
ECHO AO ROOT DIAM: 2.4 CM
ECHO AO ROOT INDEX: 1.54 CM/M2
ECHO AV MEAN GRADIENT: 4 MMHG
ECHO AV MEAN VELOCITY: 0.9 M/S
ECHO AV PEAK GRADIENT: 10 MMHG
ECHO AV PEAK VELOCITY: 1.6 M/S
ECHO AV VELOCITY RATIO: 0.63
ECHO AV VTI: 21.8 CM
ECHO BSA: 1.59 M2
ECHO EST RA PRESSURE: 15 MMHG
ECHO LA AREA 2C: 19.7 CM2
ECHO LA AREA 4C: 19.4 CM2
ECHO LA DIAMETER INDEX: 2.95 CM/M2
ECHO LA DIAMETER: 4.6 CM
ECHO LA MAJOR AXIS: 5.8 CM
ECHO LA MINOR AXIS: 6.3 CM
ECHO LA TO AORTIC ROOT RATIO: 1.92
ECHO LA VOL 2C: 52 ML (ref 22–52)
ECHO LA VOL 4C: 54 ML (ref 22–52)
ECHO LA VOL BP: 55 ML (ref 22–52)
ECHO LA VOL/BSA BIPLANE: 35 ML/M2 (ref 16–34)
ECHO LA VOLUME INDEX A2C: 33 ML/M2 (ref 16–34)
ECHO LA VOLUME INDEX A4C: 35 ML/M2 (ref 16–34)
ECHO LV E' LATERAL VELOCITY: 10 CM/S
ECHO LV E' SEPTAL VELOCITY: 7 CM/S
ECHO LV FRACTIONAL SHORTENING: 14 % (ref 28–44)
ECHO LV INTERNAL DIMENSION DIASTOLE INDEX: 2.31 CM/M2
ECHO LV INTERNAL DIMENSION DIASTOLIC: 3.6 CM (ref 3.9–5.3)
ECHO LV INTERNAL DIMENSION SYSTOLIC INDEX: 1.99 CM/M2
ECHO LV INTERNAL DIMENSION SYSTOLIC: 3.1 CM
ECHO LV IVSD: 1.1 CM (ref 0.6–0.9)
ECHO LV MASS 2D: 115.9 G (ref 67–162)
ECHO LV MASS INDEX 2D: 74.3 G/M2 (ref 43–95)
ECHO LV POSTERIOR WALL DIASTOLIC: 1 CM (ref 0.6–0.9)
ECHO LV RELATIVE WALL THICKNESS RATIO: 0.56
ECHO LVOT PEAK GRADIENT: 4 MMHG
ECHO LVOT PEAK VELOCITY: 1 M/S
ECHO MV E DECELERATION TIME (DT): 236 MS
ECHO MV E VELOCITY: 1.14 M/S
ECHO MV E/E' LATERAL: 11.4
ECHO MV E/E' RATIO (AVERAGED): 13.84
ECHO MV E/E' SEPTAL: 16.29
ECHO RIGHT VENTRICULAR SYSTOLIC PRESSURE (RVSP): 54 MMHG
ECHO TV REGURGITANT MAX VELOCITY: 3.11 M/S
ECHO TV REGURGITANT PEAK GRADIENT: 39 MMHG
EKG ATRIAL RATE: 220 BPM
EKG Q-T INTERVAL: 336 MS
EKG QRS DURATION: 82 MS
EKG QTC CALCULATION (BAZETT): 424 MS
EKG R AXIS: -69 DEGREES
EKG T AXIS: 73 DEGREES
EKG VENTRICULAR RATE: 96 BPM
GFR SERPL CREATININE-BSD FRML MDRD: 55 ML/MIN/1.73M2
GLUCOSE SERPL-MCNC: 103 MG/DL (ref 70–99)
POTASSIUM SERPL-SCNC: 4.1 MMOL/L (ref 3.7–5.3)
SODIUM SERPL-SCNC: 135 MMOL/L (ref 135–144)

## 2023-08-10 PROCEDURE — 99239 HOSP IP/OBS DSCHRG MGMT >30: CPT | Performed by: FAMILY MEDICINE

## 2023-08-10 PROCEDURE — 83880 ASSAY OF NATRIURETIC PEPTIDE: CPT

## 2023-08-10 PROCEDURE — 6370000000 HC RX 637 (ALT 250 FOR IP): Performed by: FAMILY MEDICINE

## 2023-08-10 PROCEDURE — 6370000000 HC RX 637 (ALT 250 FOR IP): Performed by: NURSE PRACTITIONER

## 2023-08-10 PROCEDURE — 6370000000 HC RX 637 (ALT 250 FOR IP): Performed by: STUDENT IN AN ORGANIZED HEALTH CARE EDUCATION/TRAINING PROGRAM

## 2023-08-10 PROCEDURE — 80048 BASIC METABOLIC PNL TOTAL CA: CPT

## 2023-08-10 PROCEDURE — 2580000003 HC RX 258: Performed by: NURSE PRACTITIONER

## 2023-08-10 PROCEDURE — 6370000000 HC RX 637 (ALT 250 FOR IP): Performed by: INTERNAL MEDICINE

## 2023-08-10 PROCEDURE — 36415 COLL VENOUS BLD VENIPUNCTURE: CPT

## 2023-08-10 RX ORDER — MIDODRINE HYDROCHLORIDE 5 MG/1
2.5 TABLET ORAL 2 TIMES DAILY WITH MEALS
Qty: 30 TABLET | Refills: 0 | Status: SHIPPED | OUTPATIENT
Start: 2023-08-10 | End: 2023-09-09

## 2023-08-10 RX ADMIN — SODIUM CHLORIDE, PRESERVATIVE FREE 10 ML: 5 INJECTION INTRAVENOUS at 08:57

## 2023-08-10 RX ADMIN — POLYETHYLENE GLYCOL 3350 17 G: 17 POWDER, FOR SOLUTION ORAL at 08:57

## 2023-08-10 RX ADMIN — OXYCODONE HYDROCHLORIDE 10 MG: 5 TABLET ORAL at 09:55

## 2023-08-10 RX ADMIN — LEVOTHYROXINE SODIUM 50 MCG: 50 TABLET ORAL at 05:12

## 2023-08-10 RX ADMIN — ASPIRIN 81 MG: 81 TABLET, COATED ORAL at 08:55

## 2023-08-10 RX ADMIN — METOPROLOL SUCCINATE 25 MG: 25 TABLET, EXTENDED RELEASE ORAL at 08:55

## 2023-08-10 RX ADMIN — ATORVASTATIN CALCIUM 80 MG: 80 TABLET, FILM COATED ORAL at 08:55

## 2023-08-10 RX ADMIN — PANTOPRAZOLE SODIUM 40 MG: 40 TABLET, DELAYED RELEASE ORAL at 05:12

## 2023-08-10 RX ADMIN — Medication 200 MG: at 08:54

## 2023-08-10 RX ADMIN — BUMETANIDE 1 MG: 1 TABLET ORAL at 08:54

## 2023-08-10 RX ADMIN — EMPAGLIFLOZIN 10 MG: 10 TABLET, FILM COATED ORAL at 08:55

## 2023-08-10 RX ADMIN — MULTIPLE VITAMINS W/ MINERALS TAB 1 TABLET: TAB at 08:55

## 2023-08-10 RX ADMIN — LOSARTAN POTASSIUM 25 MG: 25 TABLET, FILM COATED ORAL at 08:55

## 2023-08-10 RX ADMIN — DOXYCYCLINE 100 MG: 100 CAPSULE ORAL at 08:55

## 2023-08-10 RX ADMIN — APIXABAN 2.5 MG: 2.5 TABLET, FILM COATED ORAL at 08:55

## 2023-08-10 RX ADMIN — MIDODRINE HYDROCHLORIDE 2.5 MG: 2.5 TABLET ORAL at 08:55

## 2023-08-10 ASSESSMENT — ENCOUNTER SYMPTOMS
DIARRHEA: 0
RHINORRHEA: 0
COUGH: 0
VOMITING: 0
NAUSEA: 0
ABDOMINAL PAIN: 0
CONSTIPATION: 0
SHORTNESS OF BREATH: 0
WHEEZING: 0
BLOOD IN STOOL: 0
CHEST TIGHTNESS: 0

## 2023-08-10 ASSESSMENT — PAIN SCALES - GENERAL
PAINLEVEL_OUTOF10: 8
PAINLEVEL_OUTOF10: 8

## 2023-08-10 ASSESSMENT — PAIN DESCRIPTION - LOCATION: LOCATION: WRIST

## 2023-08-10 ASSESSMENT — PAIN DESCRIPTION - ORIENTATION: ORIENTATION: RIGHT

## 2023-08-10 NOTE — DISCHARGE SUMMARY
[facility]  Office: [phone]  Fatemeh Carter DO, Kaz Reyes MD, Consuelo Stallworth MD, Dino Conley MD, Newton Paul MD, Rubia Pa MD, Jj Richards MD, Zelalem Abdi MD, Lio Sung MD, Kemi May MD, Ashley Owusu DO, Kasandra Deras MD, Lisbeth Sultana MD, Sean Fair DO, Jada Reeves MD,  Tunde Ohara DO, Delon Carpenter MD, Laurie Arroyo MD, Jodeen Kussmaul [facility], [facility] CNP, Adrianaanora Fluke CNP, Hanna Upson CNS, [facility], [facility], Laila Smith CNP, Alaina Juarez CNP, Ruslan Robertson CNP, Nadeen Atkins PA-C, Lizabeth Severs CNP, Nnacy Foley [facility], [facility], [facility], Beverly Bethea CNP, Joi Cornejoovan, [address] [place] [address]      Discharge Summary     Patient ID: Edgardo De La Cruz  :  1935   MRN: [id number]     ACCOUNT:  [de-identified]   Patient Location : [facility]  Patient's PCP: Linde Merlin, APRN - CNP  Admit Date: 2023   Discharge Date: 8/10/2023     Length of Stay: 3  Code Status:  Prior  Admitting Physician: No admitting provider for patient encounter.   Discharge Physician: Dino Conley MD     Active Discharge Diagnosis :     Primary Problem  Acute kidney injury superimposed on chronic kidney disease [facility])      [address] Problems    Diagnosis Date Noted    Skin ulcer of right calf with fat layer exposed ([address]) [L97.212] 2023    Venous stasis ulcer of left calf with fat layer 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 varicose veins ([address]) [D08.608, L97.222] 2023    Acute kidney injury superimposed on chronic kidney disease ([address]) [N17.9, N18.9] 2023    Redness and swelling of lower leg [M79.89, R23.8] 2023    Hyperkalemia [E87.5] 2023    Elevated TSH [R79.89] 2023    Elevated troponin [R77.8] 2023    Hypotension [I95.9] 2023    Acute on chronic systolic heart failure (HCC) [I50.23] 2023    Bilateral leg

## 2023-08-10 NOTE — PROGRESS NOTES
[facility]  Office: [address], DO, Luzma Coleman, DO, Tatiana Par, DO, Michel Humphrey Blood, DO, Solis Toth MD, José Miguel Calles MD, Aaron Toledo MD, Andres Dave MD,  Alex Rosen MD, Sachi Higgins MD, Aimee Ayala, DO, Addison Edwards MD,  Gina Talley MD, Liza Negron MD, Kim Mancuso, DO, Yury Schafer MD,  Vonda Dallas, DO, Leonardo Dickerson MD, Gloria Champion MD, Gerri Zamora MD, Wanda Espinoza MD,  Milagro Swain MD, Shubham Boyd MD, Kiara Walsh, DO, Rudi Manzanares MD,  Monse Brown MD, Parisa Luna, CNP,  Pedro Raines, CNP, David Roberts, CNP, Denice Cushing, CNP,  Chance Vega, DNP, Tamia Sanders, CNP, Raeann Joseph, CNP, Rafa Manjarrez, CNP, Dannielle Fallon, CNP, Dianna Oliva, CNP, Onur Pepe, PA-C, Harshal De La Torre, CNS, Anat King, CNP, Sophia Craig, [address] [place] [address]      Daily Progress Note     Admit Date: 8/7/2023  Bed/Room No.  2522/2820-86  Admitting Physician : Aaron Toledo MD  Code Status :Full Code  Hospital Day:  LOS: 3 days   Chief Complaint:     Chief Complaint   Patient presents with    Shortness of Breath    Leg Swelling     Hx CHF. Leg Pain     Bilat. Had surgery w/ metal roberto placement in legs in 2014. Sees ortho at [facility] Problem:    Acute kidney injury superimposed on chronic kidney disease ([address])  Active Problems:    Acute on chronic systolic heart failure (HCC)    Bilateral leg edema    Hyponatremia    Hyperlipemia    Longstanding persistent atrial fibrillation (HCC)    Exertional dyspnea    Redness and swelling of lower leg    Hyperkalemia    Elevated TSH    Elevated troponin    Hypotension    Skin ulcer of right calf with fat layer exposed ([address])    Venous stasis ulcer of left calf with fat layer 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 varicose veins (HCC)  Resolved Problems:    * No resolved hospital problems. *    Subjective :         Interval History/Significant events :
Nutrition Education    Educated on heart failure education importance of weight monitoring, following fluid restriction and sodium restriction. Learners: Patient and Family  Readiness: Eager  Method: Explanation and Handout  Response: Verbalizes Understanding  Contact name and number provided.     Soraya Anguiano RD  Contact Number: [phone]
Occupational Therapy  Facility/Department: [facility] PROGRESSIVE CARE  Occupational Therapy Initial Assessment    Name: Claude Crow  : 1935  MRN: [id number]  Date of Service: 2023    VIKTOR Briggs reports patient is medically stable for therapy treatment this date. Chart reviewed prior to treatment and patient is agreeable for therapy. All lines intact and patient positioned comfortably at end of treatment. All patient needs addressed prior to ending therapy session. Pt currently functioning below baseline. Recommend daily inpatient skilled therapy at time of discharge to maximize long term outcomes and prevent re-admission. Please refer to AM-PAC score for current level of function. Discharge Recommendations:  Patient would benefit from continued therapy after discharge  OT Equipment Recommendations  Equipment Needed: Yes  Mobility Devices: ADL Assistive Devices; Walker  Walker: Rolling  ADL Assistive Devices: Long-handled Shoe Horn;Long-handled Sponge; Toileting - 3-in-1 Commode;Emergency Alert System       Patient Diagnosis(es): The primary encounter diagnosis was Acute on chronic systolic congestive heart failure ([address]). A diagnosis of Redness and swelling of lower leg was also pertinent to this visit. Past Medical History:  has a past medical history of Bowel obstruction ([address]), Broken hip ([address]), Diverticulitis, DVT (deep venous thrombosis) ([address]), Heart attack ([address]), and MRSA (methicillin resistant staph aureus) culture positive. Past Surgical History:  has a past surgical history that includes Total hip arthroplasty. PER H&P:    Patient presents to the emergency room today with complaints of worsening shortness of breath, bilateral leg edema and pain. Patient states that she had recently gotten back from vacation but has been experiencing worsening shortness of breath for a few months. Her legs started to become more edematous and painful approximately 1 week ago.  Patient was sitting in the
Physical Therapy  Facility/Department: [facility] PROGRESSIVE CARE  Physical Therapy Initial Assessment    Name: Radha Palmer  : 1935  MRN: [id number]  Date of Service: 2023    Discharge Recommendations:  Patient would benefit from continued therapy after discharge   PT Equipment Recommendations  Equipment Needed: Yes  Mobility Devices: Dario Pike: Maximilian    Pt presented to ED on 23 with complaints of worsening shortness of breath, bilateral leg edema and pain. Patient states that she had recently gotten back from vacation but has been experiencing worsening shortness of breath for a few months. Her legs started to become more edematous and painful approximately 1 week ago. Pt was sitting in the back of a car for >3.5 hours to and from their vacation destination. Pt also reported that her bilateral thighs hurt as well. She has a history of falling and has rods in her bilateral femurs. There is slight redness noted to her bilateral thighs         Pt admitted for CHRISTY superimposed on CKD and acute systolic heart failure. Patient Diagnosis(es): The primary encounter diagnosis was Acute on chronic systolic congestive heart failure ([address]). A diagnosis of Redness and swelling of lower leg was also pertinent to this visit. Past Medical History:  has a past medical history of Bowel obstruction ([address]), Broken hip ([address]), Diverticulitis, DVT (deep venous thrombosis) ([address]), Heart attack ([address]), and MRSA (methicillin resistant staph aureus) culture positive. Past Surgical History:  has a past surgical history that includes Total hip arthroplasty. Assessment   Body Structures, Functions, Activity Limitations Requiring Skilled Therapeutic Intervention: Decreased functional mobility ; Decreased ADL status; Decreased strength;Decreased safe awareness;Decreased endurance;Decreased balance; Increased pain;Decreased posture  Assessment: Pt with deficits of bed mobility, transfers, ambulation, balance, posture, safety
Physician Progress Note      PATIENT:               Velia Hanson  CSN #:                  [id number]  :                       1935  ADMIT DATE:       2023 3:33 PM  [facility] DATE:        8/10/2023 10:15 AM  RESPONDING  PROVIDER #:        Christina Mendez MD          QUERY TEXT:    Pt admitted with acute on chronic CHF exacerbation. Pt noted to also have HTN,   Ischemic cardiomyopathy & Moderate to severe tricuspid regurgitation. If   possible, please document in progress notes and discharge summary the etiology   of CHF, if able to be determined. The medical record reflects the following:  Risk Factors:  HTN, Ischemic cardiomyopathy & Moderate to severe tricuspid   regurgitation  Clinical Indicators: Acute on chronic systolic heart failure. Treatment: Continue diuretics. Strict I's and O's. Limit fluid intake. Micah TOPETEN, RN  Options provided:  -- CHF due to Hypertensive Heart Disease  -- CHF due to Hypertensive Heart Disease and ICMP  -- CHF not due to Hypertension but due to ICMP  -- CHF due to Hypertensive Heart Disease and Valvular Heart Disease  -- CHF not due to Hypertension but due to valvular heart disease  -- CHF due to HTN, CAD, ICMP and valvular disease  -- Other - I will add my own diagnosis  -- Disagree - Not applicable / Not valid  -- Disagree - Clinically unable to determine / Unknown  -- Refer to Clinical Documentation Reviewer    PROVIDER RESPONSE TEXT:    This patient has CHF due to hypertensive heart disease. Query created by: Smith Stephens on 8/10/2023 9:24 AM      QUERY TEXT:    Pt admitted with acute on chronic CHF exacerbation.  Per Cardio: NSTEMI:   No chest pain, likely due to heart failure, renal insufficiency or possibly   rapid ventricular response with A-fib, yet here on telemetry she is rate   controlled in the 50s.  If possible, please document in progress notes and   discharge summary:  The medical record reflects the following:  Risk Factors:
[facility]  Office: [address], DO, Mary Pisano, DO, Leo Palafox, DO, Natty Ferguson Blood, DO, Blade Carreno MD, Moose Minor MD, Trey Craven MD, Madisyn Patel MD,  Alisa Polanco MD, Pretty Raya MD, Carlos Alberto Jenkins, DO, Juanito Acosta MD,  Marga Quintero MD, Eve Sales MD, Charlesetta Burkitt, DO, Karey Lunsford MD,  James Dias, DO, Nina Cai MD, Nevaeh Farmer MD, Jaya Boucher MD, Ute Love MD,  Yong Bamberger, MD, Fay Omer MD, Lisa Avitia, DO, Janina Veliz MD,  Hardik Aguilera MD, Juan Carlos Dodd, CNP,  Sylvia Chaparro, CNP, Elpidio Anders, CNP, Miya Restrepo, CNP,  José Luis Larsen, DNP, Khushboo Irizarry, CNP, Kyara Gutierrez, CNP, Reuben Storey, CNP, Ragena Rubinstein, CNP, Starr Diaz, CNP, Maddison Escoto, PA-C, Kurt Oilvera, [facility], Francesca Mireles, CNP, Diana Warner, [address] [place] [address]      Daily Progress Note     Admit Date: 8/7/2023  Bed/Room No.  1950/6891-68  Admitting Physician : Trey Craven MD  Code Status :Full Code  Hospital Day:  LOS: 1 day   Chief Complaint:     Chief Complaint   Patient presents with    Shortness of Breath    Leg Swelling     Hx CHF. Leg Pain     Bilat. Had surgery w/ metal roberto placement in legs in 2014. Sees ortho at [facility] Problem:    Acute kidney injury superimposed on chronic kidney disease ([address])  Active Problems:    Acute on chronic systolic heart failure (HCC)    Bilateral leg edema    Hyponatremia    Hyperlipemia    Longstanding persistent atrial fibrillation (HCC)    Exertional dyspnea    Redness and swelling of lower leg    Hyperkalemia    Elevated TSH    Elevated troponin    Hypotension  Resolved Problems:    * No resolved hospital problems. *    Subjective : Interval History/Significant events :  08/08/23    Patient remains in atrial fibrillation rhythm. Rate is controlled. Blood pressure stable. Patient is breathing on room air.   She is
Pt discharged to [facility], left via [address] Belonging gathered and taken with pt, IV removed, discharge instruction given, pt verbalizes understanding. All questions and concerns addressed. Safety maintained.
[facility]  Office: [address], DO, Jone Morales, DO, Jagdeep Wen, DO, Cassy Carter, DO, Alisha Boogie MD, Valente Favre, MD, Sofiya Sanchez MD, Luis Fan MD,  Carol Nair MD, Kristen Dunbar MD, Viridiana Smiley, DO, Ryan Deluna MD,  Marcella Earl MD, Ole Snyder MD, Rosenda Robb DO, Jaron Elliott MD,  Tiny Dunaway, DO, Lisbeth Marshall MD, Robb Cantrell MD, Bard Pavel MD, Coby Maharaj MD,  Sean Carrillo MD, Zulema Oconnell MD, Lew Paz DO, Jan Gallegos MD,  Mary Sauceda MD, Claudette Means, CNP,  Julio Benavidez, CNP, Donna Sierra, CNP, Nii Jackson, CNP,  Jc Oshea, [facility], Sulma Go, CNP, Tena Alpers, CNP, Alexia Marrero, CNP, Sylvia Cedeño, CNP, Mike Botello, CNP, Oliverio Balbuena PA-C, Carley Phelps, CNS, Markus Sanchez, CNP, Bob Ordonez, [address] [place] [address]      Daily Progress Note     Admit Date: 8/7/2023  Bed/Room No.  5562/0893-30  Admitting Physician : Sofiya Sanchez MD  Code Status :Full Code  Hospital Day:  LOS: 2 days   Chief Complaint:     Chief Complaint   Patient presents with    Shortness of Breath    Leg Swelling     Hx CHF. Leg Pain     Bilat. Had surgery w/ metal roberto placement in legs in 2014. Sees ortho at [facility] Problem:    Acute kidney injury superimposed on chronic kidney disease ([address])  Active Problems:    Acute on chronic systolic heart failure (HCC)    Bilateral leg edema    Hyponatremia    Hyperlipemia    Longstanding persistent atrial fibrillation (HCC)    Exertional dyspnea    Redness and swelling of lower leg    Hyperkalemia    Elevated TSH    Elevated troponin    Hypotension  Resolved Problems:    * No resolved hospital problems. *    Subjective : Interval History/Significant events :  08/09/23    Patient is breathing better . She is on room air . She has compression stockings in legs.  Patient is c/o itching and rash in upper
Writer called Beronica garcia to give report. Put on hold, no answer. Will try again.
[] Medication Reconciliation was completed and the patient's home medication list was verified. The Med List Status is \"Complete\". The following sources were used to assist with Medication Reconciliation:    [] Patient had a list of medications which was transcribed into the EHR. [] Patient provided bottles of their medications    [x] Home medications reviewed and confirmed with patient    [] Contacted patient's pharmacy to confirm home medications    [] Contacted patient's physician office to confirm home medications    [] Medical Records from another facility and/or Care Everywhere were reviewed      [] There are one or more home medications that need clarification before Medication Reconciliation can be completed. The Med List Status has been marked as In Progress. To assist with Home Medication Reconciliation the following actions have been taken:    [] Pharmacy medication reconciliation service requested.  (Note: This can be done by sending a Perfect Serve message to [facility] Pharmacist or by Kike Costa [phone].)  [] Family requested to bring medications into the hospital  [] Family requested to call hospital with medication list  [] Message left with physician office  [] Request for medical records made to n/a  [] Other n/a
Sit  Assistance Level: Minimal assistance; Moderate assistance; Requires x 2 assistance  Skilled Clinical Factors: Verbal/tactile cues for hand placement, nose over toes, upright posture, walker safety/mgmt, backing all the way up to surface, reaching back prior to sitting down, controlled stand to sit and overall safety. Neuromuscular Education  Neuromuscular education: Yes  NDT Treatment: Gait ;Sitting;Standing     Assessment  Assessment  Assessment: Pt tolerated session well and is progressing towards goals. Pt remains limited by B LE Pain, SOB, decreased activity tolerance and global weakness. Pt req'd Min-Mod A x2 overall for bed mobility, functional transfers and functional mobility using RW. Pt req's A LOT of assist with self acre tasks. Skilled OT indicated to increase safety and IND with all functional tasks to ensure a safe return to PLOF. Activity Tolerance: Patient tolerated treatment well;Patient limited by endurance; Patient limited by pain  Discharge Recommendations: Patient would benefit from continued therapy after discharge  Safety Devices  Safety Devices in place: Yes  Type of devices: All fall risk precautions in place; Left in chair;Nurse notified;Call light within reach; Chair alarm in place;Gait belt;Patient at risk for falls    Patient Education  Education  Education Given To: Patient  Education Provided: Mobility Training; Fall Prevention Strategies;Transfer Training;Energy Conservation;Precautions; Safety;Equipment  Education Method: Verbal;Teach Back  Barriers to Learning: None  Education Outcome: Verbalized understanding;Demonstrated understanding;Continued education needed    Plan  Occupational Therapy Plan  Times Per Week: 5x/week 1x/day as tang  Current Treatment Recommendations: Strengthening;ROM;Balance training;Functional mobility training; Endurance training;Pain management; Neuromuscular re-education; Safety education & training;Patient/Caregiver education & training;Positioning;Equipment
decrease risk for falls  Short Term Goal 2: Inc gait to amb 100ft or > indep w/ RW to enable pt to return to previous level of independence & able to demonstrate indep/ safe use of RW in functional activities including approaching surfaces and turning to sit. Short Term Goal 3: Inc strength to [address] standing balance to good with device to facilitate pt independence for performance of ADL's & functional mobility, & reduce fall risk  Short Term Goal 4: Pt able to tolerate 30-40 min of activity to include 15-20 reps of ex, NMR & functional mobility with device to facilitate activity tolerance to [facility]  Short Term Goal 5: Ed pt on home ex's, safety, balance & endurance training, pressure relief with Pt able to demonstrate effective pressure relief techniques, energy conservation(planning, pacing, prioritizing & positioning), self regulating breathing techniques, fall prevention, & issue written pt education    Education  Patient Education  Education Given To: Patient  Education Provided: Role of Therapy;Plan of Care;Home Exercise Program;Transfer Training  Education Provided Comments: Educated on safety with transfers and ambulation  Education Method: Verbal;Demonstration  Education Outcome: Verbalized understanding;Continued education needed    Therapy Time   Individual Concurrent Group Co-treatment   Time In       1109   Time Out       1138   Minutes       34     Co-treatment with OT warranted secondary to decreased safety and independence requiring 2 skilled therapy professionals to address individual discipline's goals. PT addressing   , pre gait trunk strengthening, weight shifting prior to transfers, transfer training, and postural control in sitting.    Baldo Paulson, PTA
found for: IONCA  Magnesium:   Lab Results   Component Value Date/Time    MG 1.9 08/07/2023 03:49 PM     Albumin:   Lab Results   Component Value Date/Time    LABALBU 3.0 08/07/2023 03:49 PM   Summary  Left ventricle is normal in size Global left ventricular systolic function is moderately reduced Estimated ejection fraction is 35-40 % . Apical akinesis noted. Left atrium is moderately dilated. Right atrium is moderately dilated . Mild mitral regurgitation. Moderate to severe tricuspid regurgitation. Estimated right ventricular systolic pressure is 13-71 mmHg. No pericardial effusion. Normal aortic root dimension. ECHO    Radiology:  ONE XRAY VIEW OF THE CHEST     8/8/2023 10:47 am     COMPARISON:  08/07/2023     HISTORY:  ORDERING SYSTEM PROVIDED HISTORY: CHF  TECHNOLOGIST PROVIDED HISTORY:  CHF  Reason for Exam: port ap upright sob and leg swelling     FINDINGS:  Unchanged elevation of the right hemidiaphragm. Bronchovascular crowding  with a patchy opacity overlying the right lower lung zone. Unchanged  cardiomediastinal silhouette. No pleural effusion or pneumothorax. No acute  osseous abnormality. Aortic calcifications. IMPRESSION:  Unchanged patchy opacity overlying the right lower lung zone likely  represents atelectasis with infection not excluded    RETROPERITONEAL ULTRASOUND OF THE KIDNEYS AND URINARY BLADDER     8/8/2023     COMPARISON:  None     HISTORY:  ORDERING SYSTEM PROVIDED HISTORY: CHRISTY  TECHNOLOGIST PROVIDED HISTORY:     CHRISTY     82-year-old female with acute kidney injury     FINDINGS:     Kidneys:     Right kidney measures 11.0 x 6.3 x 5.0 cm. Right renal cortical thickness  measures 1.2 cm. Left kidney measures 8.7 x 5.1 x 4.0 cm. Left renal cortical thickness  measures 1 cm. Bilateral renal cysts with the largest at the upper pole right kidney  measuring 3.6 x 3.5 x 3.4 cm. Lower pole simple right renal cyst measures 2.0 x 1.7 x 1.5 cm.      Lower pole left renal cyst
SEDRATE 36 (H) 08/08/2023       CMP:  Albumin:    Lab Results   Component Value Date/Time    LABALBU 3.0 08/07/2023 03:49 PM     PT/INR:    Lab Results   Component Value Date/Time    PROTIME 21.3 06/09/2023 12:45 PM    INR 1.9 06/09/2023 12:45 PM     HgBA1c:  No results found for: LABA1C  PTT: No components found for: LABPTT      PHYSICAL EXAM    General Appearance: alert and oriented to person, place and time, well-developed and well-nourished, in no acute distress  Head: normocephalic and atraumatic  Pulmonary/Chest: normal air movement, no respiratory distress  Skin: RLE with full thickness wound. Wound bed is red with granulation and noted fibrin in center of wound bed. Edges are crusted and perimeter is ecchymotic and edematous. LLE with full thickness wound with granular pink tissue.    Cardiovascular/Circulation: BLE  edema, no cyanosis, palpable peripheral pulses  Extremities: no cyanosis and no clubbing  Musculoskeletal: no joint deformity or tenderness, no extremity amputations  Neurologic: gait steady, coordination normal and speech normal      Assessment:       Sherman Risk Score: Sherman Scale Score: 17    Patient Active Problem List   Diagnosis Code    Open wound of left lower leg S81.802A    Unilateral primary osteoarthritis, left knee M17.12    Acute on chronic systolic heart failure (HCC) I50.23    Bilateral leg edema R60.0    Hyponatremia E87.1    Hyperlipemia E78.5    Primary hypertension I10    Longstanding persistent atrial fibrillation ([facility]) I48.11    Exertional dyspnea R06.09    Acute respiratory failure with hypoxia ([facility]) J96.01    Bronchitis J40    Acute kidney injury superimposed on chronic kidney disease (HCC) N17.9, N18.9    Redness and swelling of lower leg M79.89, R23.8    Hyperkalemia E87.5    Elevated TSH R79.89    Elevated troponin R77.8    Hypotension I95.9    Skin ulcer of right calf with fat layer exposed ([address]) L97.212    Venous stasis ulcer of left calf with fat layer 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

## 2023-08-10 NOTE — PLAN OF CARE
Patient admitted with bilateral leg swelling with weeping edema and redness on BLE. Hx CHF. Patient also has CHRISTY superimposed on CKD. Vitals taken. Patient database and home meds done. Patient has US Retroperitoneal Imaging ordered for today. Patient labs improving this morning. Vitals wnl. Patient has no other skin issues aside from leg redness. Patient is alert x4, calls out appropriately, has Morphine PRN for pain, and has remained free from falls. Patient has a purewick and it's draining well. Call light and bedside table within reach. Safety measures applied. Will continue to monitor.       Problem: Discharge Planning  Goal: Discharge to home or other facility with appropriate resources  Outcome: Progressing     Problem: Pain  Goal: Verbalizes/displays adequate comfort level or baseline comfort level  Outcome: Progressing     Problem: Safety - Adult  Goal: Free from fall injury  Outcome: Progressing     Problem: ABCDS Injury Assessment  Goal: Absence of physical injury  Outcome: Progressing
Patient is A&Ox4 is able to ambulate with walker with assistance. Legs wrapped in ACE bandage per wound care. Patient was accepted to [facility], discharge tomorrow morning at 10am. Patient complained about Hive like welts on her upper extremities and left lower forearm. Dr. Davida Owens notified, benadryl ordered. PT/OT worked with patient, tolerated well. Patient is a fall risk, bed alarm is on, call device within reach  Problem: Discharge Planning  Goal: Discharge to home or other facility with appropriate resources  Outcome: Progressing     Problem: Pain  Goal: Verbalizes/displays adequate comfort level or baseline comfort level  Outcome: Progressing     Problem: Safety - Adult  Goal: Free from fall injury  Outcome: Progressing     Problem: Respiratory - Adult  Goal: Achieves optimal ventilation and oxygenation  Outcome: Progressing     Problem: Skin/Tissue Integrity  Goal: Absence of new skin breakdown  Description: 1. Monitor for areas of redness and/or skin breakdown  2. Assess vascular access sites hourly  3. Every 4-6 hours minimum:  Change oxygen saturation probe site  4. Every 4-6 hours:  If on nasal continuous positive airway pressure, respiratory therapy assess nares and determine need for appliance change or resting period.   Outcome: Progressing
Patient pain remains present. IV morphine is available and does help. Oral roxicodone was ordered for better pain control   Pain prevents patient from being able to ambulate well   Patient has two wounds, 1 to each Lower extremity. Did receive wound care consult orders to see patient for their recommendations for wound care and dressing   Empirical IV Rocephin ordered for wounds   Wounds were washed with saline, oil emulsion , dry dressing, dry wrap dressing and ACE bandages applied. Patient is unable to tolerate BRANNON hose as this time   Bilateral dopplers ordered and completed: Negative       adjusted ProAmatine to 10mg BID, IV Bumex from 0.5mg to 1mg and added Fluid restriction of 1200mls     Patient has had 800mls of urine in the external cath today since 0700.  Bladder scan at [address] showed 98mls     Problem: Discharge Planning  Goal: Discharge to home or other facility with appropriate resources  8/8/2023 1153 by Rohit Childers RN  Outcome: Progressing     Problem: Pain  Goal: Verbalizes/displays adequate comfort level or baseline comfort level  8/8/2023 1153 by Rohit Childers RN  Outcome: Progressing     Problem: Safety - Adult  Goal: Free from fall injury  8/8/2023 1153 by Rohit Childers RN  Outcome: Progressing     Problem: ABCDS Injury Assessment  Goal: Absence of physical injury  8/8/2023 1153 by Rohit Childers RN  Outcome: Progressing
Problem: Respiratory - Adult  Goal: Achieves optimal ventilation and oxygenation  Flowsheets (Taken 8/8/2023 5626)  Achieves optimal ventilation and oxygenation:   Assess for changes in respiratory status   Respiratory therapy support as indicated  Note: BRONCHOSPASM/BRONCHOCONSTRICTION    IMPROVE  AERATION/BREATHSOUNDS  ADMINISTER BRONCHODILATOR THERAPY AS APPROPRIATE  ASSESS BREATH SOUNDS  PATIENT EDUCATION AS NEEDED
Pt is A&O x4 and has been resting comfortably in bed. Pt is admitted with CHRISTY on CKD and CHF. Pt receives scheduled Bumex 1mg BID. Pt received her first dose of Jardiance that was started by cardiology last night. Pt is incontinent of urine and has a purewick in place with a brief. Pt is wearing 2L per NC at night. VSS. Pt is on a 1200 mL fluid restriction. Pt has bilateral pretibial wounds. Wound care is on. Pt currently has an ace wrap on her BLE. Pt has a hx of a-fib and has been showing afib on monitor. Pt is receiving eliquis for anticoagulation. Pt is receiving Rocephin Q24 hours for Skin and Soft Tissue infection. Pt is to have a TTE today. All questions and concerns addressed. Bed locked in the lowest position and call light is in reach. Problem: Discharge Planning  Goal: Discharge to home or other facility with appropriate resources  8/9/2023 0123 by Mary Grace Ramírez RN  Outcome: Progressing     Problem: Pain  Goal: Verbalizes/displays adequate comfort level or baseline comfort level  8/9/2023 0123 by Mary Grace Ramírez RN  Outcome: Progressing     Problem: Safety - Adult  Goal: Free from fall injury  8/9/2023 0123 by Mary Grace Ramírez RN  Outcome: Progressing     Problem: ABCDS Injury Assessment  Goal: Absence of physical injury  8/9/2023 0123 by Mary Grace Ramírez RN  Outcome: Progressing     Problem: Skin/Tissue Integrity  Goal: Absence of new skin breakdown  Description: 1. Monitor for areas of redness and/or skin breakdown  2. Assess vascular access sites hourly  3. Every 4-6 hours minimum:  Change oxygen saturation probe site  4. Every 4-6 hours:  If on nasal continuous positive airway pressure, respiratory therapy assess nares and determine need for appliance change or resting period.   Outcome: Progressing     Problem: Respiratory - Adult  Goal: Achieves optimal ventilation and oxygenation  8/9/2023 0123 by Mary Grace Ramírez RN  Outcome: Progressing
RN  Outcome: Progressing     Problem: Respiratory - Adult  Goal: Achieves optimal ventilation and oxygenation  8/10/2023 0106 by Landen Irene RN  Outcome: Progressing
Quality 110: Preventive Care And Screening: Influenza Immunization: Influenza Immunization Administered during Influenza season
Detail Level: Detailed

## 2023-09-06 ENCOUNTER — OFFICE VISIT (OUTPATIENT)
Dept: FAMILY MEDICINE CLINIC | Age: 88
End: 2023-09-06
Payer: MEDICARE

## 2023-09-06 VITALS
BODY MASS INDEX: 25.91 KG/M2 | OXYGEN SATURATION: 93 % | TEMPERATURE: 97 F | SYSTOLIC BLOOD PRESSURE: 110 MMHG | HEART RATE: 84 BPM | WEIGHT: 132 LBS | HEIGHT: 60 IN | DIASTOLIC BLOOD PRESSURE: 62 MMHG

## 2023-09-06 DIAGNOSIS — E03.9 ACQUIRED HYPOTHYROIDISM: ICD-10-CM

## 2023-09-06 DIAGNOSIS — M70.32 BURSITIS OF LEFT ELBOW, UNSPECIFIED BURSA: Primary | ICD-10-CM

## 2023-09-06 DIAGNOSIS — L52 ERYTHEMA NODOSUM: ICD-10-CM

## 2023-09-06 PROBLEM — I35.0 NONRHEUMATIC AORTIC VALVE STENOSIS: Status: ACTIVE | Noted: 2023-01-01

## 2023-09-06 PROBLEM — R79.89 ELEVATED TROPONIN: Status: RESOLVED | Noted: 2023-08-07 | Resolved: 2023-09-06

## 2023-09-06 PROBLEM — I35.9 AORTIC VALVE DISORDER: Status: ACTIVE | Noted: 2023-01-01

## 2023-09-06 PROBLEM — R77.8 ELEVATED TROPONIN: Status: RESOLVED | Noted: 2023-08-07 | Resolved: 2023-09-06

## 2023-09-06 PROBLEM — I34.0 NONRHEUMATIC MITRAL VALVE REGURGITATION: Status: ACTIVE | Noted: 2023-01-01

## 2023-09-06 PROBLEM — I50.9 CHRONIC CONGESTIVE HEART FAILURE (HCC): Status: ACTIVE | Noted: 2023-09-06

## 2023-09-06 PROBLEM — I25.10 CORONARY ARTERY DISEASE INVOLVING NATIVE CORONARY ARTERY OF NATIVE HEART WITHOUT ANGINA PECTORIS: Status: ACTIVE | Noted: 2023-09-06

## 2023-09-06 PROBLEM — I48.0 PAROXYSMAL ATRIAL FIBRILLATION (HCC): Status: ACTIVE | Noted: 2023-09-06

## 2023-09-06 PROBLEM — I25.5 ISCHEMIC CARDIOMYOPATHY: Status: ACTIVE | Noted: 2023-09-06

## 2023-09-06 PROCEDURE — 1123F ACP DISCUSS/DSCN MKR DOCD: CPT | Performed by: NURSE PRACTITIONER

## 2023-09-06 PROCEDURE — 99214 OFFICE O/P EST MOD 30 MIN: CPT | Performed by: NURSE PRACTITIONER

## 2023-09-06 PROCEDURE — 3074F SYST BP LT 130 MM HG: CPT | Performed by: NURSE PRACTITIONER

## 2023-09-06 PROCEDURE — 3078F DIAST BP <80 MM HG: CPT | Performed by: NURSE PRACTITIONER

## 2023-09-06 ASSESSMENT — ENCOUNTER SYMPTOMS
BLOOD IN STOOL: 0
NAUSEA: 0
CHEST TIGHTNESS: 0
VOMITING: 0
ABDOMINAL PAIN: 0
COLOR CHANGE: 1
COUGH: 0
SHORTNESS OF BREATH: 0
TROUBLE SWALLOWING: 0

## 2023-09-06 NOTE — PROGRESS NOTES
Visit Information    Have you changed or started any medications since your last visit including any over-the-counter medicines, vitamins, or herbal medicines? no   Have you stopped taking any of your medications? Is so, why? -  no  Are you having any side effects from any of your medications? - no    Have you seen any other physician or provider since your last visit?  no   Have you had any other diagnostic tests since your last visit?  no   Have you been seen in the emergency room and/or had an admission in a hospital since we last saw you?  no   Have you had your routine dental cleaning in the past 6 months?  no     Do you have an active MyChart account? If no, what is the barrier?   Yes    Patient Care Team:  Linde Merlin, APRN - CNP as PCP - General (Family Nurse Practitioner)  Linde Merlin, APRN - CNP as PCP - Empaneled Provider    Medical History Review  Past Medical, Family, and Social History reviewed and  contribute to the patient presenting condition    Health Maintenance   Topic Date Due    COVID-19 Vaccine (3 - Booster for [place] series) 04/01/2021    Shingles vaccine (3 of 3) 04/07/2022    Flu vaccine (1) 08/01/2023    DEXA (modify frequency per FRAX score)  07/12/2024 (Originally 12/1/1990)    Depression Screen  07/12/2024    Annual Wellness Visit (AWV)  07/12/2024    Lipids  08/08/2024    DTaP/Tdap/Td vaccine (2 - Td or Tdap) 10/31/2026    Pneumococcal 65+ years Vaccine  Completed    Hepatitis A vaccine  Aged Out    Hib vaccine  Aged Out    Meningococcal (ACWY) vaccine  Aged Out

## 2023-09-06 NOTE — PROGRESS NOTES
[facility], [address] [place] [address]  [phone]      Lam Cruz is a 82 y.o. female who presents today for her  medicalconditions/complaints as noted below. Lam Cruz is c/o of Injury (Welts on arms and legs, sprained wrist after being at [facility])  . HPI:    Injury  Associated symptoms include weakness (generalized). Pertinent negatives include no abdominal pain, chest pain, coughing, headaches, light-headedness, nausea, numbness, visual disturbance or vomiting. Patient here today for hospital follow-up and new painful red bumps to her arms and legs. States she was hospitalized August 7, 2023 and subsequently went to [facility] for rehab following that. States the welts did not show up until she was at [facility]. Also has redness swelling and warmth to her left elbow and wonders if it can be drained. Denies fever, chills. States she was on antibiotics when she was in the hospital at [facility]. Also states she has recently been started on thyroid medication and wonders if she needs to have new blood work done for this. States she is not able to take anti-inflammatories due to being on Eliquis blood thinner for chronic longstanding A-fib. She has been using Benadryl for the welts with no improvement. States they are itchy at times as well and seem to be slightly improving. Please note that this chart was generated using voice recognition Dragon dictation software. Although every effort was made to ensure the accuracy of this automated transcription, some errors in transcription may have occurred.     Past Medical History:   Diagnosis Date    Bowel obstruction ([address])     Broken hip ([address])     Diverticulitis     DVT (deep venous thrombosis) (HCC)     Heart attack (HCC)     MRSA (methicillin resistant staph aureus) culture positive       Past Surgical History:   Procedure Laterality Date    TOTAL HIP ARTHROPLASTY       Family History

## 2023-09-12 LAB
EKG ATRIAL RATE: 220 BPM
EKG Q-T INTERVAL: 336 MS
EKG QRS DURATION: 82 MS
EKG QTC CALCULATION (BAZETT): 424 MS
EKG R AXIS: -69 DEGREES
EKG T AXIS: 73 DEGREES
EKG VENTRICULAR RATE: 96 BPM

## 2023-10-11 RX ORDER — SODIUM CHLORIDE 9 MG/ML
INJECTION, SOLUTION INTRAVENOUS CONTINUOUS
OUTPATIENT
Start: 2023-10-13

## 2023-10-13 ENCOUNTER — APPOINTMENT (OUTPATIENT)
Dept: MRI IMAGING | Age: 88
End: 2023-10-13
Payer: COMMERCIAL

## 2023-10-13 ENCOUNTER — HOSPITAL ENCOUNTER (OUTPATIENT)
Age: 88
Setting detail: OUTPATIENT SURGERY
Discharge: HOME | End: 2023-10-13
Attending: STUDENT IN AN ORGANIZED HEALTH CARE EDUCATION/TRAINING PROGRAM

## 2023-10-13 ENCOUNTER — APPOINTMENT (OUTPATIENT)
Dept: CT IMAGING | Age: 88
End: 2023-10-13
Payer: COMMERCIAL

## 2023-10-13 ENCOUNTER — APPOINTMENT (OUTPATIENT)
Dept: GENERAL RADIOLOGY | Age: 88
End: 2023-10-13
Attending: EMERGENCY MEDICINE
Payer: COMMERCIAL

## 2023-10-13 ENCOUNTER — HOSPITAL ENCOUNTER (OUTPATIENT)
Age: 88
Setting detail: OBSERVATION
Discharge: HOME | End: 2023-10-16
Attending: EMERGENCY MEDICINE | Admitting: INTERNAL MEDICINE
Payer: COMMERCIAL

## 2023-10-13 ENCOUNTER — HOSPITAL ENCOUNTER (OUTPATIENT)
Age: 88
Discharge: HOME | End: 2023-10-15
Attending: STUDENT IN AN ORGANIZED HEALTH CARE EDUCATION/TRAINING PROGRAM

## 2023-10-13 DIAGNOSIS — T14.8XXA BLISTER OF SKIN: ICD-10-CM

## 2023-10-13 DIAGNOSIS — I48.0 PAROXYSMAL ATRIAL FIBRILLATION (HCC): Primary | ICD-10-CM

## 2023-10-13 DIAGNOSIS — R47.81 SLURRED SPEECH: ICD-10-CM

## 2023-10-13 DIAGNOSIS — R74.8 ELEVATED LIVER ENZYMES: ICD-10-CM

## 2023-10-13 DIAGNOSIS — R79.89 ELEVATED TROPONIN: Primary | ICD-10-CM

## 2023-10-13 PROBLEM — N18.31 STAGE 3A CHRONIC KIDNEY DISEASE (HCC): Status: ACTIVE | Noted: 2023-01-01

## 2023-10-13 PROBLEM — I21.4 NSTEMI (NON-ST ELEVATED MYOCARDIAL INFARCTION) (HCC): Status: ACTIVE | Noted: 2023-01-01

## 2023-10-13 PROBLEM — G45.9 TIA (TRANSIENT ISCHEMIC ATTACK): Status: ACTIVE | Noted: 2023-10-13

## 2023-10-13 PROBLEM — Z79.01 CHRONIC ANTICOAGULATION: Status: ACTIVE | Noted: 2023-10-13

## 2023-10-13 PROBLEM — N18.9 CKD (CHRONIC KIDNEY DISEASE): Status: ACTIVE | Noted: 2023-10-13

## 2023-10-13 LAB
ALBUMIN SERPL-MCNC: 2.2 G/DL (ref 3.5–5.2)
ALP SERPL-CCNC: 438 U/L (ref 35–104)
ALT SERPL-CCNC: 57 U/L (ref 5–33)
AMMONIA PLAS-SCNC: 21 UMOL/L (ref 11–51)
ANION GAP SERPL CALCULATED.3IONS-SCNC: 10 MMOL/L (ref 9–17)
AST SERPL-CCNC: 71 U/L
BASOPHILS # BLD: 0.05 K/UL (ref 0–0.2)
BASOPHILS NFR BLD: 1 % (ref 0–2)
BILIRUB SERPL-MCNC: 0.9 MG/DL (ref 0.3–1.2)
BILIRUB UR QL STRIP: NEGATIVE
BUN SERPL-MCNC: 59 MG/DL (ref 8–23)
BUN/CREAT SERPL: 54 (ref 9–20)
CALCIUM SERPL-MCNC: 9.2 MG/DL (ref 8.6–10.4)
CHLORIDE SERPL-SCNC: 108 MMOL/L (ref 98–107)
CLARITY UR: CLEAR
CO2 SERPL-SCNC: 21 MMOL/L (ref 20–31)
COLOR UR: YELLOW
CREAT SERPL-MCNC: 1.1 MG/DL (ref 0.5–0.9)
EKG ATRIAL RATE: 90 BPM
EKG Q-T INTERVAL: 374 MS
EKG QRS DURATION: 76 MS
EKG QTC CALCULATION (BAZETT): 431 MS
EKG R AXIS: -40 DEGREES
EKG T AXIS: 42 DEGREES
EKG VENTRICULAR RATE: 80 BPM
EOSINOPHIL # BLD: 0.06 K/UL (ref 0–0.44)
EOSINOPHILS RELATIVE PERCENT: 1 % (ref 1–4)
EPI CELLS #/AREA URNS HPF: ABNORMAL /HPF (ref 0–5)
ERYTHROCYTE [DISTWIDTH] IN BLOOD BY AUTOMATED COUNT: 16.3 % (ref 11.8–14.4)
GFR SERPL CREATININE-BSD FRML MDRD: 49 ML/MIN/1.73M2
GLUCOSE SERPL-MCNC: 83 MG/DL (ref 70–99)
GLUCOSE UR STRIP-MCNC: NEGATIVE MG/DL
HCT VFR BLD AUTO: 45.6 % (ref 36.3–47.1)
HGB BLD-MCNC: 14.2 G/DL (ref 11.9–15.1)
HGB UR QL STRIP.AUTO: NEGATIVE
IMM GRANULOCYTES # BLD AUTO: 0.05 K/UL (ref 0–0.3)
IMM GRANULOCYTES NFR BLD: 1 %
INR PPP: 2
KETONES UR STRIP-MCNC: NEGATIVE MG/DL
LEUKOCYTE ESTERASE UR QL STRIP: ABNORMAL
LYMPHOCYTES NFR BLD: 2.31 K/UL (ref 1.1–3.7)
LYMPHOCYTES RELATIVE PERCENT: 23 % (ref 24–43)
MCH RBC QN AUTO: 30.3 PG (ref 25.2–33.5)
MCHC RBC AUTO-ENTMCNC: 31.1 G/DL (ref 28.4–34.8)
MCV RBC AUTO: 97.2 FL (ref 82.6–102.9)
MONOCYTES NFR BLD: 1.02 K/UL (ref 0.1–1.2)
MONOCYTES NFR BLD: 10 % (ref 3–12)
NEUTROPHILS NFR BLD: 64 % (ref 36–65)
NEUTS SEG NFR BLD: 6.53 K/UL (ref 1.5–8.1)
NITRITE UR QL STRIP: NEGATIVE
NRBC BLD-RTO: 0 PER 100 WBC
PARTIAL THROMBOPLASTIN TIME: 38.9 SEC (ref 23.9–33.8)
PH UR STRIP: 6 [PH] (ref 5–8)
PLATELET # BLD AUTO: 263 K/UL (ref 138–453)
PMV BLD AUTO: 10.7 FL (ref 8.1–13.5)
POTASSIUM SERPL-SCNC: 4.7 MMOL/L (ref 3.7–5.3)
PROT SERPL-MCNC: 6.2 G/DL (ref 6.4–8.3)
PROT UR STRIP-MCNC: ABNORMAL MG/DL
PROTHROMBIN TIME: 22.1 SEC (ref 11.5–14.2)
RBC # BLD AUTO: 4.69 M/UL (ref 3.95–5.11)
RBC # BLD: ABNORMAL 10*6/UL
RBC #/AREA URNS HPF: ABNORMAL /HPF (ref 0–2)
SODIUM SERPL-SCNC: 139 MMOL/L (ref 135–144)
SP GR UR STRIP: 1.01 (ref 1–1.03)
T3FREE SERPL-MCNC: 1.17 PG/ML (ref 2.02–4.43)
T4 FREE SERPL-MCNC: 1.2 NG/DL (ref 0.9–1.7)
TROPONIN I SERPL HS-MCNC: 155 NG/L (ref 0–14)
TROPONIN I SERPL HS-MCNC: 156 NG/L (ref 0–14)
TSH SERPL DL<=0.05 MIU/L-ACNC: 8.53 UIU/ML (ref 0.3–5)
UROBILINOGEN UR STRIP-ACNC: NORMAL EU/DL (ref 0–1)
WBC #/AREA URNS HPF: ABNORMAL /HPF (ref 0–5)
WBC OTHER # BLD: 10 K/UL (ref 3.5–11.3)

## 2023-10-13 PROCEDURE — 96376 TX/PRO/DX INJ SAME DRUG ADON: CPT

## 2023-10-13 PROCEDURE — 85025 COMPLETE CBC W/AUTO DIFF WBC: CPT

## 2023-10-13 PROCEDURE — 80053 COMPREHEN METABOLIC PANEL: CPT

## 2023-10-13 PROCEDURE — 6360000004 HC RX CONTRAST MEDICATION: Performed by: INTERNAL MEDICINE

## 2023-10-13 PROCEDURE — G0378 HOSPITAL OBSERVATION PER HR: HCPCS

## 2023-10-13 PROCEDURE — 85610 PROTHROMBIN TIME: CPT

## 2023-10-13 PROCEDURE — 87186 SC STD MICRODIL/AGAR DIL: CPT

## 2023-10-13 PROCEDURE — 87086 URINE CULTURE/COLONY COUNT: CPT

## 2023-10-13 PROCEDURE — 99222 1ST HOSP IP/OBS MODERATE 55: CPT | Performed by: STUDENT IN AN ORGANIZED HEALTH CARE EDUCATION/TRAINING PROGRAM

## 2023-10-13 PROCEDURE — 85730 THROMBOPLASTIN TIME PARTIAL: CPT

## 2023-10-13 PROCEDURE — 87077 CULTURE AEROBIC IDENTIFY: CPT

## 2023-10-13 PROCEDURE — 6360000002 HC RX W HCPCS: Performed by: NURSE PRACTITIONER

## 2023-10-13 PROCEDURE — 84439 ASSAY OF FREE THYROXINE: CPT

## 2023-10-13 PROCEDURE — 71045 X-RAY EXAM CHEST 1 VIEW: CPT

## 2023-10-13 PROCEDURE — 36415 COLL VENOUS BLD VENIPUNCTURE: CPT

## 2023-10-13 PROCEDURE — 6360000002 HC RX W HCPCS: Performed by: EMERGENCY MEDICINE

## 2023-10-13 PROCEDURE — 2580000003 HC RX 258: Performed by: NURSE PRACTITIONER

## 2023-10-13 PROCEDURE — 81001 URINALYSIS AUTO W/SCOPE: CPT

## 2023-10-13 PROCEDURE — 70498 CT ANGIOGRAPHY NECK: CPT

## 2023-10-13 PROCEDURE — 2580000003 HC RX 258: Performed by: INTERNAL MEDICINE

## 2023-10-13 PROCEDURE — 99285 EMERGENCY DEPT VISIT HI MDM: CPT

## 2023-10-13 PROCEDURE — 70450 CT HEAD/BRAIN W/O DYE: CPT

## 2023-10-13 PROCEDURE — 84484 ASSAY OF TROPONIN QUANT: CPT

## 2023-10-13 PROCEDURE — 86225 DNA ANTIBODY NATIVE: CPT

## 2023-10-13 PROCEDURE — 2580000003 HC RX 258: Performed by: EMERGENCY MEDICINE

## 2023-10-13 PROCEDURE — 70551 MRI BRAIN STEM W/O DYE: CPT

## 2023-10-13 PROCEDURE — 84481 FREE ASSAY (FT-3): CPT

## 2023-10-13 PROCEDURE — 82140 ASSAY OF AMMONIA: CPT

## 2023-10-13 PROCEDURE — 93005 ELECTROCARDIOGRAM TRACING: CPT | Performed by: EMERGENCY MEDICINE

## 2023-10-13 PROCEDURE — 84443 ASSAY THYROID STIM HORMONE: CPT

## 2023-10-13 PROCEDURE — 99222 1ST HOSP IP/OBS MODERATE 55: CPT | Performed by: INTERNAL MEDICINE

## 2023-10-13 PROCEDURE — 96375 TX/PRO/DX INJ NEW DRUG ADDON: CPT

## 2023-10-13 PROCEDURE — 86038 ANTINUCLEAR ANTIBODIES: CPT

## 2023-10-13 PROCEDURE — 6370000000 HC RX 637 (ALT 250 FOR IP): Performed by: EMERGENCY MEDICINE

## 2023-10-13 RX ORDER — ASPIRIN 81 MG/1
324 TABLET, CHEWABLE ORAL ONCE
Status: COMPLETED | OUTPATIENT
Start: 2023-10-13 | End: 2023-10-13

## 2023-10-13 RX ORDER — BRIMONIDINE TARTRATE 2 MG/ML
1 SOLUTION/ DROPS OPHTHALMIC 2 TIMES DAILY
Status: DISCONTINUED | OUTPATIENT
Start: 2023-10-13 | End: 2023-10-16 | Stop reason: HOSPADM

## 2023-10-13 RX ORDER — DIPHENHYDRAMINE HYDROCHLORIDE 50 MG/ML
50 INJECTION INTRAMUSCULAR; INTRAVENOUS ONCE
Status: COMPLETED | OUTPATIENT
Start: 2023-10-13 | End: 2023-10-13

## 2023-10-13 RX ORDER — LOSARTAN POTASSIUM 25 MG/1
25 TABLET ORAL DAILY
Status: DISCONTINUED | OUTPATIENT
Start: 2023-10-13 | End: 2023-10-16 | Stop reason: HOSPADM

## 2023-10-13 RX ORDER — SODIUM CHLORIDE 0.9 % (FLUSH) 0.9 %
10 SYRINGE (ML) INJECTION PRN
Status: CANCELLED | OUTPATIENT
Start: 2023-10-13

## 2023-10-13 RX ORDER — HYDROXYZINE HYDROCHLORIDE 10 MG/1
10 TABLET, FILM COATED ORAL 3 TIMES DAILY PRN
Status: DISCONTINUED | OUTPATIENT
Start: 2023-10-13 | End: 2023-10-16 | Stop reason: HOSPADM

## 2023-10-13 RX ORDER — METOPROLOL SUCCINATE 50 MG/1
25 TABLET, EXTENDED RELEASE ORAL DAILY
Status: DISCONTINUED | OUTPATIENT
Start: 2023-10-13 | End: 2023-10-16 | Stop reason: HOSPADM

## 2023-10-13 RX ORDER — SODIUM CHLORIDE 9 MG/ML
INJECTION, SOLUTION INTRAVENOUS PRN
Status: DISCONTINUED | OUTPATIENT
Start: 2023-10-13 | End: 2023-10-16 | Stop reason: HOSPADM

## 2023-10-13 RX ORDER — 0.9 % SODIUM CHLORIDE 0.9 %
80 INTRAVENOUS SOLUTION INTRAVENOUS ONCE
Status: COMPLETED | OUTPATIENT
Start: 2023-10-13 | End: 2023-10-13

## 2023-10-13 RX ORDER — ASPIRIN 81 MG/1
81 TABLET ORAL DAILY
Status: DISCONTINUED | OUTPATIENT
Start: 2023-10-14 | End: 2023-10-15

## 2023-10-13 RX ORDER — ATORVASTATIN CALCIUM 80 MG/1
80 TABLET, FILM COATED ORAL DAILY
Status: DISCONTINUED | OUTPATIENT
Start: 2023-10-13 | End: 2023-10-15

## 2023-10-13 RX ORDER — SODIUM CHLORIDE 0.9 % (FLUSH) 0.9 %
10 SYRINGE (ML) INJECTION EVERY 12 HOURS SCHEDULED
Status: DISCONTINUED | OUTPATIENT
Start: 2023-10-13 | End: 2023-10-16 | Stop reason: HOSPADM

## 2023-10-13 RX ORDER — ONDANSETRON 2 MG/ML
4 INJECTION INTRAMUSCULAR; INTRAVENOUS EVERY 6 HOURS PRN
Status: DISCONTINUED | OUTPATIENT
Start: 2023-10-13 | End: 2023-10-16 | Stop reason: HOSPADM

## 2023-10-13 RX ORDER — ONDANSETRON 4 MG/1
4 TABLET, ORALLY DISINTEGRATING ORAL EVERY 8 HOURS PRN
Status: DISCONTINUED | OUTPATIENT
Start: 2023-10-13 | End: 2023-10-16 | Stop reason: HOSPADM

## 2023-10-13 RX ORDER — OMEPRAZOLE 20 MG/1
20 CAPSULE, DELAYED RELEASE ORAL
Status: DISCONTINUED | OUTPATIENT
Start: 2023-10-14 | End: 2023-10-16 | Stop reason: HOSPADM

## 2023-10-13 RX ORDER — POTASSIUM CHLORIDE 750 MG/1
10 TABLET, EXTENDED RELEASE ORAL DAILY
Status: DISCONTINUED | OUTPATIENT
Start: 2023-10-14 | End: 2023-10-16 | Stop reason: HOSPADM

## 2023-10-13 RX ORDER — BUMETANIDE 1 MG/1
0.5 TABLET ORAL 2 TIMES DAILY
Status: DISCONTINUED | OUTPATIENT
Start: 2023-10-13 | End: 2023-10-15

## 2023-10-13 RX ORDER — LEVOTHYROXINE SODIUM 0.05 MG/1
50 TABLET ORAL DAILY
Status: DISCONTINUED | OUTPATIENT
Start: 2023-10-14 | End: 2023-10-16 | Stop reason: HOSPADM

## 2023-10-13 RX ORDER — SODIUM CHLORIDE 0.9 % (FLUSH) 0.9 %
10 SYRINGE (ML) INJECTION PRN
Status: DISCONTINUED | OUTPATIENT
Start: 2023-10-13 | End: 2023-10-16 | Stop reason: HOSPADM

## 2023-10-13 RX ORDER — 0.9 % SODIUM CHLORIDE 0.9 %
80 INTRAVENOUS SOLUTION INTRAVENOUS ONCE
Status: CANCELLED | OUTPATIENT
Start: 2023-10-13 | End: 2023-10-13

## 2023-10-13 RX ORDER — LATANOPROST 50 UG/ML
1 SOLUTION/ DROPS OPHTHALMIC NIGHTLY
Status: DISCONTINUED | OUTPATIENT
Start: 2023-10-13 | End: 2023-10-16 | Stop reason: HOSPADM

## 2023-10-13 RX ORDER — 0.9 % SODIUM CHLORIDE 0.9 %
500 INTRAVENOUS SOLUTION INTRAVENOUS ONCE
Status: COMPLETED | OUTPATIENT
Start: 2023-10-13 | End: 2023-10-13

## 2023-10-13 RX ADMIN — DIPHENHYDRAMINE HYDROCHLORIDE 50 MG: 50 INJECTION INTRAMUSCULAR; INTRAVENOUS at 20:31

## 2023-10-13 RX ADMIN — SODIUM CHLORIDE 500 ML: 9 INJECTION, SOLUTION INTRAVENOUS at 15:50

## 2023-10-13 RX ADMIN — IOPAMIDOL 75 ML: 755 INJECTION, SOLUTION INTRAVENOUS at 21:06

## 2023-10-13 RX ADMIN — SODIUM CHLORIDE 80 ML: 0.9 INJECTION, SOLUTION INTRAVENOUS at 21:09

## 2023-10-13 RX ADMIN — LATANOPROST 1 DROP: 50 SOLUTION/ DROPS OPHTHALMIC at 22:31

## 2023-10-13 RX ADMIN — WATER 40 MG: 1 INJECTION INTRAMUSCULAR; INTRAVENOUS; SUBCUTANEOUS at 17:41

## 2023-10-13 RX ADMIN — BUMETANIDE 0.5 MG: 1 TABLET ORAL at 21:41

## 2023-10-13 RX ADMIN — WATER 40 MG: 1 INJECTION INTRAMUSCULAR; INTRAVENOUS; SUBCUTANEOUS at 20:31

## 2023-10-13 RX ADMIN — ASPIRIN 81 MG CHEWABLE TABLET 324 MG: 81 TABLET CHEWABLE at 16:04

## 2023-10-13 RX ADMIN — DIPHENHYDRAMINE HYDROCHLORIDE 50 MG: 50 INJECTION INTRAMUSCULAR; INTRAVENOUS at 17:40

## 2023-10-13 RX ADMIN — BRIMONIDINE TARTRATE 1 DROP: 2 SOLUTION/ DROPS OPHTHALMIC at 21:41

## 2023-10-13 RX ADMIN — SODIUM CHLORIDE, PRESERVATIVE FREE 10 ML: 5 INJECTION INTRAVENOUS at 22:32

## 2023-10-13 RX ADMIN — SODIUM CHLORIDE, PRESERVATIVE FREE 10 ML: 5 INJECTION INTRAVENOUS at 21:07

## 2023-10-13 ASSESSMENT — ENCOUNTER SYMPTOMS
EYE PAIN: 0
VOMITING: 0
EYE DISCHARGE: 0
SORE THROAT: 0
BACK PAIN: 0
CHEST TIGHTNESS: 0
PHOTOPHOBIA: 0
RHINORRHEA: 0
ABDOMINAL DISTENTION: 0
COUGH: 0
EYE ITCHING: 0
SHORTNESS OF BREATH: 0
SINUS PAIN: 0
ABDOMINAL PAIN: 0
BLOOD IN STOOL: 0
DIARRHEA: 0
CHOKING: 0

## 2023-10-13 ASSESSMENT — PAIN - FUNCTIONAL ASSESSMENT: PAIN_FUNCTIONAL_ASSESSMENT: 0-10

## 2023-10-13 ASSESSMENT — PAIN DESCRIPTION - DESCRIPTORS: DESCRIPTORS: BURNING

## 2023-10-13 ASSESSMENT — PAIN DESCRIPTION - LOCATION: LOCATION: FOOT

## 2023-10-13 ASSESSMENT — PAIN DESCRIPTION - PAIN TYPE: TYPE: ACUTE PAIN

## 2023-10-13 ASSESSMENT — PAIN SCALES - GENERAL: PAINLEVEL_OUTOF10: 8

## 2023-10-13 ASSESSMENT — PAIN DESCRIPTION - ORIENTATION: ORIENTATION: RIGHT;LEFT;LOWER

## 2023-10-13 NOTE — ED NOTES
Dr. Meghan Boateng notified pf troponin per perfectserve. Per Dr. Meghan Boateng continue eliquis and trend troponins.       Mariya Guerrero RN  10/13/23 8639

## 2023-10-13 NOTE — H&P
PAF  Continue home Eliquis, ASA and statin  Check Lipids  Monitor telemetry    Stage 3 CKD  Avoid nephrotoxic agents    Elevated Liver enzymes  Repeat levels in am  Liver US    Hypothyroidism  Continue home synthroid  Check T3  Follow up with PCP for further adjusting/review     Multiple skin blisters of uncertain etiology, possible pemphigoid type syndrome versus allergic reaction to infectious etiologies. Obtain ID consult.   Check ALEKSANDR        Consultations:   IP CONSULT TO NEUROLOGY  IP CONSULT TO HOSPITALIST  IP CONSULT TO CARDIOLOGY  IP CONSULT TO INFECTIOUS DISEASES        Blanco Cleary DO  10/13/2023  7:01 PM    Copy sent to Dr. Oksana Mccracken, APRN - CNP

## 2023-10-13 NOTE — ED NOTES
Pt to er with family at side for evaluation. Pt family states they noticed for past 3 days that pt has had slurred speech. Pt was recently in hospital for admission. Pt states while she was here she developed a sore and now has multiple open sores. Pt states she has been to pcp and dermatologist. Pt states she had cultures done and does not know what the sores are. Pt states she was told that \"they think its a staph infection but dont know for sure\" pt has been on antibiotics. Pt states she has felt weak and fatigued, pt denies chest pain. Pt denies sob or difficulty breathing. Pt is a&ox3, answering questions appropriately with clear speech. Resp non-labored.       Farhan Molina RN  10/13/23 6926

## 2023-10-13 NOTE — ED PROVIDER NOTES
[facility] ED  Emergency Department Encounter  Emergency Medicine Physician Note     Pt Wilson Mi  MRN: [id number]  [address] [place] 1935  Date of evaluation: 10/13/23  PCP:  FLORIAN Mart CNP  Note Started: 12:56 PM EDT      CHIEF COMPLAINT       Chief Complaint   Patient presents with    Aphasia    Rash    OTHER     Pt reports being in Afib        HISTORY OF PRESENT ILLNESS  (Location/Symptom, Timing/Onset, Context/Setting, Quality, Duration, Modifying Factors, Severity.)      Johnathon José is a 82 y.o. female who presents with slurred speech, fatigue, and rash. The slurred speech has been progressively worsening over the last 3 to 4 days. Was first noticed by daughter-in-law. Patient been worked up by dermatology for the rash, swabs were taken for evaluation of MRSA. Patient has had generalized fatigue that has been progressively worsening. Patient was noted to be in A-fib back in August, has been anticoagulated, was scheduled for cardioversion today, due to slurred speech they came to the emergency department instead of cardioversion. PAST MEDICAL / SURGICAL / SOCIAL / FAMILY HISTORY      has a past medical history of Bowel obstruction ([address]), Broken hip ([address]), CKD (chronic kidney disease), Diverticulitis, DVT (deep venous thrombosis) ([address]), Heart attack ([address]), and MRSA (methicillin resistant staph aureus) culture positive. No additional pertinent        has a past surgical history that includes Total hip arthroplasty and Coronary angioplasty with stent (2014).   No additional pertinent       Social History     Socioeconomic History    Marital status:      Spouse name: Not on file    Number of children: Not on file    Years of education: Not on file    Highest education level: Not on file   Occupational History    Not on file   Tobacco Use    Smoking status: Never    Smokeless tobacco: Never   Vaping Use    Vaping Use: Not on file   Substance and Sexual Activity    Alcohol

## 2023-10-14 ENCOUNTER — APPOINTMENT (OUTPATIENT)
Dept: ULTRASOUND IMAGING | Age: 88
End: 2023-10-14
Payer: COMMERCIAL

## 2023-10-14 LAB
ALBUMIN SERPL-MCNC: 2.1 G/DL (ref 3.5–5.2)
ALP SERPL-CCNC: 395 U/L (ref 35–104)
ALT SERPL-CCNC: 55 U/L (ref 5–33)
ANION GAP SERPL CALCULATED.3IONS-SCNC: 13 MMOL/L (ref 9–17)
AST SERPL-CCNC: 64 U/L
BILIRUB SERPL-MCNC: 0.8 MG/DL (ref 0.3–1.2)
BUN SERPL-MCNC: 56 MG/DL (ref 8–23)
BUN/CREAT SERPL: 56 (ref 9–20)
CALCIUM SERPL-MCNC: 8.9 MG/DL (ref 8.6–10.4)
CHLORIDE SERPL-SCNC: 104 MMOL/L (ref 98–107)
CHOLEST SERPL-MCNC: 66 MG/DL
CHOLESTEROL/HDL RATIO: 3
CO2 SERPL-SCNC: 20 MMOL/L (ref 20–31)
CREAT SERPL-MCNC: 1 MG/DL (ref 0.5–0.9)
CRP SERPL HS-MCNC: 80.5 MG/L (ref 0–5)
ERYTHROCYTE [DISTWIDTH] IN BLOOD BY AUTOMATED COUNT: 16.2 % (ref 11.8–14.4)
ERYTHROCYTE [SEDIMENTATION RATE] IN BLOOD BY PHOTOMETRIC METHOD: 26 MM/HR (ref 0–30)
GFR SERPL CREATININE-BSD FRML MDRD: 55 ML/MIN/1.73M2
GLUCOSE SERPL-MCNC: 93 MG/DL (ref 70–99)
HCT VFR BLD AUTO: 44.8 % (ref 36.3–47.1)
HDLC SERPL-MCNC: 22 MG/DL
HGB BLD-MCNC: 13.5 G/DL (ref 11.9–15.1)
LDLC SERPL CALC-MCNC: 28 MG/DL (ref 0–130)
MCH RBC QN AUTO: 29.9 PG (ref 25.2–33.5)
MCHC RBC AUTO-ENTMCNC: 30.1 G/DL (ref 28.4–34.8)
MCV RBC AUTO: 99.1 FL (ref 82.6–102.9)
NRBC BLD-RTO: 0.4 PER 100 WBC
PLATELET # BLD AUTO: 263 K/UL (ref 138–453)
PMV BLD AUTO: 11.1 FL (ref 8.1–13.5)
POTASSIUM SERPL-SCNC: 5 MMOL/L (ref 3.7–5.3)
PROT SERPL-MCNC: 6 G/DL (ref 6.4–8.3)
RBC # BLD AUTO: 4.52 M/UL (ref 3.95–5.11)
SODIUM SERPL-SCNC: 137 MMOL/L (ref 135–144)
TRIGL SERPL-MCNC: 82 MG/DL
TROPONIN I SERPL HS-MCNC: 126 NG/L (ref 0–14)
WBC OTHER # BLD: 5.3 K/UL (ref 3.5–11.3)

## 2023-10-14 PROCEDURE — 87070 CULTURE OTHR SPECIMN AEROBIC: CPT

## 2023-10-14 PROCEDURE — 85652 RBC SED RATE AUTOMATED: CPT

## 2023-10-14 PROCEDURE — 80061 LIPID PANEL: CPT

## 2023-10-14 PROCEDURE — 94760 N-INVAS EAR/PLS OXIMETRY 1: CPT

## 2023-10-14 PROCEDURE — 87205 SMEAR GRAM STAIN: CPT

## 2023-10-14 PROCEDURE — G0378 HOSPITAL OBSERVATION PER HR: HCPCS

## 2023-10-14 PROCEDURE — 86140 C-REACTIVE PROTEIN: CPT

## 2023-10-14 PROCEDURE — 99223 1ST HOSP IP/OBS HIGH 75: CPT | Performed by: INTERNAL MEDICINE

## 2023-10-14 PROCEDURE — 2580000003 HC RX 258: Performed by: INTERNAL MEDICINE

## 2023-10-14 PROCEDURE — 99232 SBSQ HOSP IP/OBS MODERATE 35: CPT | Performed by: INTERNAL MEDICINE

## 2023-10-14 PROCEDURE — 36415 COLL VENOUS BLD VENIPUNCTURE: CPT

## 2023-10-14 PROCEDURE — 80053 COMPREHEN METABOLIC PANEL: CPT

## 2023-10-14 PROCEDURE — 87075 CULTR BACTERIA EXCEPT BLOOD: CPT

## 2023-10-14 PROCEDURE — 2580000003 HC RX 258: Performed by: STUDENT IN AN ORGANIZED HEALTH CARE EDUCATION/TRAINING PROGRAM

## 2023-10-14 PROCEDURE — 97116 GAIT TRAINING THERAPY: CPT

## 2023-10-14 PROCEDURE — 83036 HEMOGLOBIN GLYCOSYLATED A1C: CPT

## 2023-10-14 PROCEDURE — 99232 SBSQ HOSP IP/OBS MODERATE 35: CPT | Performed by: STUDENT IN AN ORGANIZED HEALTH CARE EDUCATION/TRAINING PROGRAM

## 2023-10-14 PROCEDURE — 76705 ECHO EXAM OF ABDOMEN: CPT

## 2023-10-14 PROCEDURE — 6360000002 HC RX W HCPCS: Performed by: INTERNAL MEDICINE

## 2023-10-14 PROCEDURE — 97162 PT EVAL MOD COMPLEX 30 MIN: CPT

## 2023-10-14 PROCEDURE — 97535 SELF CARE MNGMENT TRAINING: CPT

## 2023-10-14 PROCEDURE — 85027 COMPLETE CBC AUTOMATED: CPT

## 2023-10-14 PROCEDURE — 87040 BLOOD CULTURE FOR BACTERIA: CPT

## 2023-10-14 PROCEDURE — 2700000000 HC OXYGEN THERAPY PER DAY

## 2023-10-14 PROCEDURE — 97166 OT EVAL MOD COMPLEX 45 MIN: CPT

## 2023-10-14 PROCEDURE — 2580000003 HC RX 258: Performed by: NURSE PRACTITIONER

## 2023-10-14 PROCEDURE — 97530 THERAPEUTIC ACTIVITIES: CPT

## 2023-10-14 PROCEDURE — 96365 THER/PROPH/DIAG IV INF INIT: CPT

## 2023-10-14 RX ORDER — 0.9 % SODIUM CHLORIDE 0.9 %
1000 INTRAVENOUS SOLUTION INTRAVENOUS ONCE
Status: COMPLETED | OUTPATIENT
Start: 2023-10-14 | End: 2023-10-14

## 2023-10-14 RX ORDER — SPIRONOLACTONE 25 MG/1
25 TABLET ORAL DAILY
Status: DISCONTINUED | OUTPATIENT
Start: 2023-10-14 | End: 2023-10-16 | Stop reason: HOSPADM

## 2023-10-14 RX ADMIN — ATORVASTATIN CALCIUM 80 MG: 80 TABLET, FILM COATED ORAL at 12:36

## 2023-10-14 RX ADMIN — VANCOMYCIN HYDROCHLORIDE 1500 MG: 5 INJECTION, POWDER, LYOPHILIZED, FOR SOLUTION INTRAVENOUS at 14:44

## 2023-10-14 RX ADMIN — LEVOTHYROXINE SODIUM 50 MCG: 0.05 TABLET ORAL at 08:49

## 2023-10-14 RX ADMIN — POTASSIUM CHLORIDE 10 MEQ: 750 TABLET, EXTENDED RELEASE ORAL at 12:36

## 2023-10-14 RX ADMIN — SODIUM CHLORIDE 1000 ML: 9 INJECTION, SOLUTION INTRAVENOUS at 13:42

## 2023-10-14 RX ADMIN — BRIMONIDINE TARTRATE 1 DROP: 2 SOLUTION/ DROPS OPHTHALMIC at 08:49

## 2023-10-14 RX ADMIN — LATANOPROST 1 DROP: 50 SOLUTION/ DROPS OPHTHALMIC at 20:37

## 2023-10-14 RX ADMIN — BRIMONIDINE TARTRATE 1 DROP: 2 SOLUTION/ DROPS OPHTHALMIC at 17:17

## 2023-10-14 RX ADMIN — BUMETANIDE 0.5 MG: 1 TABLET ORAL at 08:49

## 2023-10-14 RX ADMIN — SODIUM CHLORIDE, PRESERVATIVE FREE 10 ML: 5 INJECTION INTRAVENOUS at 08:50

## 2023-10-14 RX ADMIN — OMEPRAZOLE 20 MG: 20 CAPSULE, DELAYED RELEASE ORAL at 08:49

## 2023-10-14 RX ADMIN — SODIUM CHLORIDE: 9 INJECTION, SOLUTION INTRAVENOUS at 14:43

## 2023-10-14 RX ADMIN — ATORVASTATIN CALCIUM 80 MG: 80 TABLET, FILM COATED ORAL at 20:36

## 2023-10-14 RX ADMIN — ASPIRIN 81 MG: 81 TABLET ORAL at 12:36

## 2023-10-14 NOTE — CONSULTS
Reason for Consult: Atrial fibrillation  Requesting Physician: Blanco Cleary DO    CHIEF COMPLAINT: Slurred speech    History Obtained From:  patient, daughter and electronic medical record    HISTORY OF PRESENT ILLNESS:      The patient is a 82 y.o. female with significant past medical history of atrial fibrillation who presents with slurred speech. Actually a Cardioversion had been anticipated in the near future. Chronically anticoagulated with 5 mg Eliquis twice daily. Found to have carotid artery disease also. Past Medical History:    Past Medical History:   Diagnosis Date    Bowel obstruction ([address])     Broken hip ([address])     CKD (chronic kidney disease) 10/13/2023    Diverticulitis     DVT (deep venous thrombosis) ([facility])     Heart attack ([address])     MRSA (methicillin resistant staph aureus) culture positive      Past Surgical History:    Past Surgical History:   Procedure Laterality Date    CORONARY ANGIOPLASTY WITH STENT PLACEMENT  2014    TOTAL HIP ARTHROPLASTY       Home Medications:  Prior to Admission medications    Medication Sig Start Date End Date Taking? Authorizing Provider   midodrine (PROAMATINE) 5 MG tablet Take 0.5 tablets by mouth 2 times daily (with meals) 8/10/23 9/9/23  Suzy Ly MD   bumetanide (BUMEX) 1 MG tablet Take 1 tablet by mouth 2 times daily  Patient taking differently: Take 0.5 tablets by mouth 2 times daily 8/9/23   Suzy Ly MD   metoprolol succinate (TOPROL XL) 25 MG extended release tablet Take 1 tablet by mouth daily 8/9/23   Suzy Ly MD   empagliflozin (JARDIANCE) 10 MG tablet Take 1 tablet by mouth daily 8/10/23   Suzy Ly MD   losartan (COZAAR) 25 MG tablet Take 1 tablet by mouth daily 8/9/23   Suzy Ly MD   levothyroxine (SYNTHROID) 50 MCG tablet Take 1 tablet by mouth Daily 6/9/23   Clint Carter DO   NONFORMULARY Take 1 capsule by mouth in the morning and 1 capsule in the evening. OTC supplement EyePromise.     Provider,
VASCULAR SURGERY   CONSULT        Name: Radha Palmer  MRN: [id number]     Acct: [de-identified]  Room: [address] [place] [address]    Admit Date: 10/13/2023  PCP: FLORIAN Potts CNP    Physician Requesting Consult:  Dr. 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  female who presents with Aphasia, Rash, and OTHER (Pt reports being in Afib )  Patient initially presented to her cardiologist for cardioversion. At that time she was noted to have slurred speech with concern for possible TIA versus stroke. Procedure was canceled and she was evaluated in the emergency room. CTA of the neck was reviewed and shows occlusion of the right internal carotid artery. Calcified plaque is noted at the left carotid bifurcation and there does appear to be some stenosis at the distal common and proximal internal carotid however study was read as only mild. Carotid duplex has not yet been performed. MRI of the brain was negative for any acute infarction. She does have chronic atrial fibrillation and was previously on Eliquis. She denies any current amaurosis fugax, lateralizing symptoms, claudication or family history of aortic aneurysm. She does have very thin skin involving her lower extremities with chronic venous skin changes.     Past Medical History:     Past Medical History:   Diagnosis Date    Bowel obstruction ([address])     Broken hip ([address])     CKD (chronic kidney disease) 10/13/2023    Diverticulitis     DVT (deep venous thrombosis) (HCC)     Heart attack (HCC)     MRSA (methicillin resistant staph aureus) culture positive         Past Surgical History:     Past Surgical History:   Procedure Laterality
reviewed/ordered the following labs:    CBC with Differential:   Recent Labs     10/13/23  1350 10/14/23  0531   WBC 10.0 5.3   HGB 14.2 13.5   HCT 45.6 44.8    263   LYMPHOPCT 23*  --    MONOPCT 10  --      BMP:  Recent Labs     10/13/23  1350 10/14/23  0531    137   K 4.7 5.0   * 104   CO2 21 20   BUN 59* 56*   CREATININE 1.1* 1.0*     Hepatic Function Panel:   Recent Labs     10/13/23  1350 10/14/23  0531   PROT 6.2* 6.0*   LABALBU 2.2* 2.1*   BILITOT 0.9 0.8   ALKPHOS 438* 395*   ALT 57* 55*   AST 71* 64*     No results for input(s): \"RPR\" in the last 72 hours. No results for input(s): \"HIV\" in the last 72 hours. No results for input(s): \"BC\" in the last 72 hours. Lab Results   Component Value Date/Time    CREATININE 1.0 10/14/2023 05:31 AM    GLUCOSE 93 10/14/2023 05:31 AM    GLUCOSE 116 05/31/2023 10:51 AM       Detailed results: Thank you for allowing us to participate in the care of this patient. Please call with questions. This note is created with the assistance of a speech recognition program.  While intending to generate adocument that actually reflects the content of the visit, the document can still have some errors including those of syntax and sound a like substitutions which may escape proof reading. It such instances, actual meaningcan be extrapolated by contextual diversion.     Markus Lauren MD  Office: [phone]  Perfect serve / office [phone]
09/06/2023    Coronary artery disease involving native coronary artery of native heart without angina pectoris [I25.10] 09/06/2023    Chronic congestive heart failure ([address]) [I50.9] 09/06/2023    Elevated TSH [R79.89] 08/07/2023    Elevated troponin [R79.89] 08/07/2023    Primary hypertension [I10] 06/03/2023       Plan:     Patient status Admit as inpatient in the  Progressive Unit/Step down    Transient Ischemic Event  -CT head WO 10/13 no acute intracranial findings  -CTA head and neck 10/13 Asymptomatic Chronic Right ICA proximal occlusion   -MRI brain WO 10/13 No acute intracranial findings, Chronic T2 Flair white matter changes   -Load aspirin 324mg once and continue 81mg daily  -Admit for basic stroke workup and monitoring given concerns for speech difficulty and AMS progressive over the last few days    A-fib  -Continue Home Eliquis 5mg BID       PT/OT/ST  PM&R evaluation recommended given fairly subacute rapid decline in mobility and ability to care for herself     Consultations:   [facility] HOSPITALIST  IP CONSULT TO CARDIOLOGY  IP CONSULT TO INFECTIOUS DISEASES      Follow-up further recommendations after discussing the case with attending  The plan was discussed with the patient, patient's family and the medical staff. Patient is admitted as inpatient status because of co-morbidities listed above, severity of signs and symptoms as outlined, requirement for current medical therapies and most importantly because of direct risk to patient if care not provided in a hospital setting.     Lora Grove MD  Attending Physician   10/14/2023  2:54 AM    Copy sent to Dr. Willistine Peabody, APRN - CNP

## 2023-10-14 NOTE — CARE COORDINATION
Discharge planning    Attempted again to complete assessment but patient sleeping soundly. Will try again later.

## 2023-10-15 PROBLEM — I65.21 INTERNAL CAROTID ARTERY OCCLUSION, RIGHT: Status: ACTIVE | Noted: 2023-01-01

## 2023-10-15 PROBLEM — I65.23 MILD ATHEROSCLEROSIS OF CAROTID ARTERY, BILATERAL: Status: ACTIVE | Noted: 2023-01-01

## 2023-10-15 PROBLEM — R47.81 SLURRED SPEECH: Status: ACTIVE | Noted: 2023-10-15

## 2023-10-15 LAB
EST. AVERAGE GLUCOSE BLD GHB EST-MCNC: 137 MG/DL
HBA1C MFR BLD: 6.4 % (ref 4–6)
MICROORGANISM SPEC CULT: ABNORMAL
SPECIMEN DESCRIPTION: ABNORMAL
TROPONIN I SERPL HS-MCNC: 121 NG/L (ref 0–14)

## 2023-10-15 PROCEDURE — 2580000003 HC RX 258: Performed by: INTERNAL MEDICINE

## 2023-10-15 PROCEDURE — 96376 TX/PRO/DX INJ SAME DRUG ADON: CPT

## 2023-10-15 PROCEDURE — 93005 ELECTROCARDIOGRAM TRACING: CPT | Performed by: INTERNAL MEDICINE

## 2023-10-15 PROCEDURE — G0378 HOSPITAL OBSERVATION PER HR: HCPCS

## 2023-10-15 PROCEDURE — 6360000002 HC RX W HCPCS: Performed by: INTERNAL MEDICINE

## 2023-10-15 PROCEDURE — 87154 CUL TYP ID BLD PTHGN 6+ TRGT: CPT

## 2023-10-15 PROCEDURE — 6370000000 HC RX 637 (ALT 250 FOR IP): Performed by: STUDENT IN AN ORGANIZED HEALTH CARE EDUCATION/TRAINING PROGRAM

## 2023-10-15 PROCEDURE — 84484 ASSAY OF TROPONIN QUANT: CPT

## 2023-10-15 PROCEDURE — 99232 SBSQ HOSP IP/OBS MODERATE 35: CPT | Performed by: INTERNAL MEDICINE

## 2023-10-15 PROCEDURE — 87205 SMEAR GRAM STAIN: CPT

## 2023-10-15 PROCEDURE — 80074 ACUTE HEPATITIS PANEL: CPT

## 2023-10-15 PROCEDURE — 87040 BLOOD CULTURE FOR BACTERIA: CPT

## 2023-10-15 PROCEDURE — 2580000003 HC RX 258: Performed by: NURSE PRACTITIONER

## 2023-10-15 PROCEDURE — 96366 THER/PROPH/DIAG IV INF ADDON: CPT

## 2023-10-15 PROCEDURE — 36415 COLL VENOUS BLD VENIPUNCTURE: CPT

## 2023-10-15 PROCEDURE — 6370000000 HC RX 637 (ALT 250 FOR IP): Performed by: INTERNAL MEDICINE

## 2023-10-15 RX ORDER — FUROSEMIDE 40 MG/1
40 TABLET ORAL 2 TIMES DAILY
Status: DISCONTINUED | OUTPATIENT
Start: 2023-10-15 | End: 2023-10-16 | Stop reason: HOSPADM

## 2023-10-15 RX ORDER — ROSUVASTATIN CALCIUM 10 MG/1
20 TABLET, COATED ORAL NIGHTLY
Status: DISCONTINUED | OUTPATIENT
Start: 2023-10-15 | End: 2023-10-16 | Stop reason: HOSPADM

## 2023-10-15 RX ORDER — SPIRONOLACTONE 25 MG/1
25 TABLET ORAL DAILY
Status: CANCELLED | OUTPATIENT
Start: 2023-10-16

## 2023-10-15 RX ORDER — CLOPIDOGREL BISULFATE 75 MG/1
75 TABLET ORAL DAILY
Status: DISCONTINUED | OUTPATIENT
Start: 2023-10-15 | End: 2023-10-16 | Stop reason: HOSPADM

## 2023-10-15 RX ADMIN — SODIUM CHLORIDE, PRESERVATIVE FREE 10 ML: 5 INJECTION INTRAVENOUS at 00:03

## 2023-10-15 RX ADMIN — POTASSIUM CHLORIDE 10 MEQ: 750 TABLET, EXTENDED RELEASE ORAL at 08:58

## 2023-10-15 RX ADMIN — SODIUM CHLORIDE, PRESERVATIVE FREE 10 ML: 5 INJECTION INTRAVENOUS at 20:21

## 2023-10-15 RX ADMIN — OMEPRAZOLE 20 MG: 20 CAPSULE, DELAYED RELEASE ORAL at 06:07

## 2023-10-15 RX ADMIN — ASPIRIN 81 MG: 81 TABLET ORAL at 08:58

## 2023-10-15 RX ADMIN — FUROSEMIDE 40 MG: 40 TABLET ORAL at 17:37

## 2023-10-15 RX ADMIN — BRIMONIDINE TARTRATE 1 DROP: 2 SOLUTION/ DROPS OPHTHALMIC at 08:59

## 2023-10-15 RX ADMIN — BRIMONIDINE TARTRATE 1 DROP: 2 SOLUTION/ DROPS OPHTHALMIC at 17:37

## 2023-10-15 RX ADMIN — METHYLPREDNISOLONE SODIUM SUCCINATE 60 MG: 125 INJECTION, POWDER, LYOPHILIZED, FOR SOLUTION INTRAMUSCULAR; INTRAVENOUS at 00:00

## 2023-10-15 RX ADMIN — FUROSEMIDE 40 MG: 40 TABLET ORAL at 10:03

## 2023-10-15 RX ADMIN — ROSUVASTATIN CALCIUM 20 MG: 10 TABLET, FILM COATED ORAL at 20:21

## 2023-10-15 RX ADMIN — CLOPIDOGREL BISULFATE 75 MG: 75 TABLET ORAL at 13:44

## 2023-10-15 RX ADMIN — LEVOTHYROXINE SODIUM 50 MCG: 0.05 TABLET ORAL at 06:07

## 2023-10-15 RX ADMIN — LATANOPROST 1 DROP: 50 SOLUTION/ DROPS OPHTHALMIC at 20:21

## 2023-10-15 RX ADMIN — SODIUM CHLORIDE, PRESERVATIVE FREE 10 ML: 5 INJECTION INTRAVENOUS at 10:03

## 2023-10-15 RX ADMIN — METOPROLOL SUCCINATE 25 MG: 50 TABLET, EXTENDED RELEASE ORAL at 08:58

## 2023-10-15 RX ADMIN — VANCOMYCIN HYDROCHLORIDE 750 MG: 750 INJECTION, POWDER, LYOPHILIZED, FOR SOLUTION INTRAVENOUS at 13:39

## 2023-10-15 NOTE — ACP (ADVANCE CARE PLANNING)
Advance Care Planning     Advance Care Planning Activator (Inpatient)  Conversation Note      Date of ACP Conversation: 10/15/2023     Conversation Conducted with: Patient with Decision Making Capacity    ACP Activator: Malu Rao RN          Health Care Decision Maker:     Current Designated Health Care Decision Maker:     Primary Decision Maker: Arpit De La Cruz Child - [phone]    Secondary Decision Maker: Denisse Brewer - Child - [phone]  Click here to complete Healthcare Decision Makers including section of the Healthcare Decision Maker Relationship (ie \"Primary\")  Today we documented Decision Maker(s) consistent with Legal Next of Kin hierarchy. Care Preferences    Ventilation: \"If you were in your present state of health and suddenly became very ill and were unable to breathe on your own, what would your preference be about the use of a ventilator (breathing machine) if it were available to you? \"      Would the patient desire the use of ventilator (breathing machine)?: yes    \"If your health worsens and it becomes clear that your chance of recovery is unlikely, what would your preference be about the use of a ventilator (breathing machine) if it were available to you? \"     Would the patient desire the use of ventilator (breathing machine)?: No      Resuscitation  \"CPR works best to restart the heart when there is a sudden event, like a heart attack, in someone who is otherwise healthy. Unfortunately, CPR does not typically restart the heart for people who have serious health conditions or who are very sick. \"    \"In the event your heart stopped as a result of an underlying serious health condition, would you want attempts to be made to restart your heart (answer \"yes\" for attempt to resuscitate) or would you prefer a natural death (answer \"no\" for do not attempt to resuscitate)? \" yes       [] Yes   [] No   Educated Patient / Decision Maker regarding differences between Advance Directives and

## 2023-10-15 NOTE — CARE COORDINATION
Case Management Assessment  Initial Evaluation    Date/Time of Evaluation: 10/15/2023 1:10 PM  Assessment Completed by: Shelah Dandy, RN    If patient is discharged prior to next notation, then this note serves as note for discharge by case management. Patient Name: Graham Jones                   YOB: 1935  Diagnosis: TIA (transient ischemic attack) [G45.9]  Slurred speech [R47.81]  Elevated troponin [R79.89]                   Date / Time: 10/13/2023 12:45 PM    Patient Admission Status: Observation   Readmission Risk (Low < 19, Mod (19-27), High > 27): Readmission Risk Score: 23.7    Current PCP: Willistine Peabody, APRN - CNP  PCP verified by CM? Yes    Chart Reviewed: Yes      History Provided by: Patient  Patient Orientation: Alert and Oriented    Patient Cognition: Alert    Hospitalization in the last 30 days (Readmission):  No    If yes, Readmission Assessment in  Navigator will be completed. Advance Directives:      Code Status: Full Code   Patient's Primary Decision Maker is: Named in [facility]     [facility] [phone]    Secondary Decision Maker: Denisse Brewer  Child - [phone]    Discharge Planning:    Patient lives with: Children Type of Home: House  Primary Care Giver: Self  Patient Support Systems include: Children   Current Financial resources: Medicare  Current community resources: ECF/Home Care  Current services prior to admission: Home Care, Durable Medical Equipment (current with [facility])            Current DME: Shower Chair, Es Michelle, Other (Comment) (lift chair)            Type of Home Care services:  PT, OT, Nursing Services    ADLS  Prior functional level: Assistance with the following:, Shopping, Housework, Cooking, Bathing  Current functional level: Assistance with the following:, Shopping, Housework, Cooking, Bathing    PT AM-PAC: 12 /24  OT AM-PAC: 12 /24    Family can provide assistance at DC:  Yes  Would

## 2023-10-16 ENCOUNTER — APPOINTMENT (OUTPATIENT)
Dept: VASCULAR LAB | Age: 88
End: 2023-10-16
Attending: SURGERY
Payer: COMMERCIAL

## 2023-10-16 VITALS
BODY MASS INDEX: 25.52 KG/M2 | HEIGHT: 60 IN | WEIGHT: 130 LBS | DIASTOLIC BLOOD PRESSURE: 62 MMHG | HEART RATE: 77 BPM | SYSTOLIC BLOOD PRESSURE: 110 MMHG | OXYGEN SATURATION: 93 % | RESPIRATION RATE: 18 BRPM | TEMPERATURE: 97.9 F

## 2023-10-16 LAB
ANION GAP SERPL CALCULATED.3IONS-SCNC: 11 MMOL/L (ref 9–17)
BASOPHILS # BLD: <0.03 K/UL (ref 0–0.2)
BASOPHILS NFR BLD: 0 % (ref 0–2)
BUN SERPL-MCNC: 49 MG/DL (ref 8–23)
BUN/CREAT SERPL: 54 (ref 9–20)
CALCIUM SERPL-MCNC: 8.4 MG/DL (ref 8.6–10.4)
CHLORIDE SERPL-SCNC: 99 MMOL/L (ref 98–107)
CO2 SERPL-SCNC: 28 MMOL/L (ref 20–31)
CREAT SERPL-MCNC: 0.9 MG/DL (ref 0.5–0.9)
EKG Q-T INTERVAL: 314 MS
EKG QRS DURATION: 70 MS
EKG QTC CALCULATION (BAZETT): 422 MS
EKG R AXIS: -32 DEGREES
EKG T AXIS: 28 DEGREES
EKG VENTRICULAR RATE: 109 BPM
EOSINOPHIL # BLD: <0.03 K/UL (ref 0–0.44)
EOSINOPHILS RELATIVE PERCENT: 0 % (ref 1–4)
ERYTHROCYTE [DISTWIDTH] IN BLOOD BY AUTOMATED COUNT: 16.1 % (ref 11.8–14.4)
GFR SERPL CREATININE-BSD FRML MDRD: >60 ML/MIN/1.73M2
GLUCOSE SERPL-MCNC: 105 MG/DL (ref 70–99)
HAV IGM SERPL QL IA: NONREACTIVE
HBV CORE IGM SERPL QL IA: NONREACTIVE
HBV SURFACE AG SERPL QL IA: NONREACTIVE
HCT VFR BLD AUTO: 40.4 % (ref 36.3–47.1)
HCV AB SERPL QL IA: NONREACTIVE
HGB BLD-MCNC: 12.5 G/DL (ref 11.9–15.1)
IMM GRANULOCYTES # BLD AUTO: 0.05 K/UL (ref 0–0.3)
IMM GRANULOCYTES NFR BLD: 0 %
LYMPHOCYTES NFR BLD: 2.1 K/UL (ref 1.1–3.7)
LYMPHOCYTES RELATIVE PERCENT: 17 % (ref 24–43)
MCH RBC QN AUTO: 29.8 PG (ref 25.2–33.5)
MCHC RBC AUTO-ENTMCNC: 30.9 G/DL (ref 28.4–34.8)
MCV RBC AUTO: 96.4 FL (ref 82.6–102.9)
MICROORGANISM SPEC CULT: ABNORMAL
MONOCYTES NFR BLD: 1.21 K/UL (ref 0.1–1.2)
MONOCYTES NFR BLD: 10 % (ref 3–12)
NEUTROPHILS NFR BLD: 73 % (ref 36–65)
NEUTS SEG NFR BLD: 9.03 K/UL (ref 1.5–8.1)
NRBC BLD-RTO: 0.4 PER 100 WBC
PLATELET # BLD AUTO: 283 K/UL (ref 138–453)
PMV BLD AUTO: 10.7 FL (ref 8.1–13.5)
POTASSIUM SERPL-SCNC: 3.8 MMOL/L (ref 3.7–5.3)
RBC # BLD AUTO: 4.19 M/UL (ref 3.95–5.11)
RBC # BLD: ABNORMAL 10*6/UL
SERVICE CMNT-IMP: ABNORMAL
SODIUM SERPL-SCNC: 138 MMOL/L (ref 135–144)
SPECIMEN DESCRIPTION: ABNORMAL
TROPONIN I SERPL HS-MCNC: 94 NG/L (ref 0–14)
VANCOMYCIN SERPL-MCNC: 16.9 UG/ML
VAS LEFT BULB EDV: 11.6 CM/S
VAS LEFT BULB PSV: 40.2 CM/S
VAS LEFT CCA DIST EDV: 19.4 CM/S
VAS LEFT CCA DIST PSV: 55.8 CM/S
VAS LEFT CCA PROX EDV: 11.6 CM/S
VAS LEFT CCA PROX PSV: 46.7 CM/S
VAS LEFT ECA EDV: 20.77 CM/S
VAS LEFT ECA PSV: 114.1 CM/S
VAS LEFT ICA DIST EDV: 24.5 CM/S
VAS LEFT ICA DIST PSV: 55.6 CM/S
VAS LEFT ICA PROX EDV: 19.3 CM/S
VAS LEFT ICA PROX PSV: 51.7 CM/S
VAS LEFT ICA/CCA PSV: 1 NO UNITS
VAS LEFT VERTEBRAL EDV: 6.9 CM/S
VAS LEFT VERTEBRAL PSV: 33.1 CM/S
VAS RIGHT BULB EDV: 0 CM/S
VAS RIGHT BULB PSV: 17.5 CM/S
VAS RIGHT CCA DIST EDV: 0 CM/S
VAS RIGHT CCA DIST PSV: 28.5 CM/S
VAS RIGHT CCA PROX EDV: 0 CM/S
VAS RIGHT CCA PROX PSV: 37.3 CM/S
VAS RIGHT ECA EDV: 7.65 CM/S
VAS RIGHT ECA PSV: 75.1 CM/S
VAS RIGHT VERTEBRAL EDV: 19.36 CM/S
VAS RIGHT VERTEBRAL PSV: 59.7 CM/S
WBC OTHER # BLD: 12.4 K/UL (ref 3.5–11.3)

## 2023-10-16 PROCEDURE — 80048 BASIC METABOLIC PNL TOTAL CA: CPT

## 2023-10-16 PROCEDURE — 99232 SBSQ HOSP IP/OBS MODERATE 35: CPT | Performed by: INTERNAL MEDICINE

## 2023-10-16 PROCEDURE — G0378 HOSPITAL OBSERVATION PER HR: HCPCS

## 2023-10-16 PROCEDURE — 85025 COMPLETE CBC W/AUTO DIFF WBC: CPT

## 2023-10-16 PROCEDURE — 36415 COLL VENOUS BLD VENIPUNCTURE: CPT

## 2023-10-16 PROCEDURE — 97110 THERAPEUTIC EXERCISES: CPT

## 2023-10-16 PROCEDURE — 6370000000 HC RX 637 (ALT 250 FOR IP): Performed by: STUDENT IN AN ORGANIZED HEALTH CARE EDUCATION/TRAINING PROGRAM

## 2023-10-16 PROCEDURE — 84484 ASSAY OF TROPONIN QUANT: CPT

## 2023-10-16 PROCEDURE — 93880 EXTRACRANIAL BILAT STUDY: CPT | Performed by: SURGERY

## 2023-10-16 PROCEDURE — 93880 EXTRACRANIAL BILAT STUDY: CPT

## 2023-10-16 PROCEDURE — 6370000000 HC RX 637 (ALT 250 FOR IP): Performed by: INTERNAL MEDICINE

## 2023-10-16 PROCEDURE — 97530 THERAPEUTIC ACTIVITIES: CPT

## 2023-10-16 PROCEDURE — 2580000003 HC RX 258: Performed by: NURSE PRACTITIONER

## 2023-10-16 PROCEDURE — 99213 OFFICE O/P EST LOW 20 MIN: CPT

## 2023-10-16 PROCEDURE — 80202 ASSAY OF VANCOMYCIN: CPT

## 2023-10-16 RX ORDER — OMEPRAZOLE 20 MG/1
CAPSULE, DELAYED RELEASE ORAL
Qty: 90 CAPSULE | Refills: 1 | Status: SHIPPED | OUTPATIENT
Start: 2023-10-16

## 2023-10-16 RX ORDER — FUROSEMIDE 40 MG/1
40 TABLET ORAL 2 TIMES DAILY
Qty: 60 TABLET | Refills: 3 | Status: SHIPPED | OUTPATIENT
Start: 2023-10-16

## 2023-10-16 RX ORDER — CLOPIDOGREL BISULFATE 75 MG/1
75 TABLET ORAL DAILY
Qty: 30 TABLET | Refills: 3 | Status: SHIPPED | OUTPATIENT
Start: 2023-10-17

## 2023-10-16 RX ADMIN — CLOPIDOGREL BISULFATE 75 MG: 75 TABLET ORAL at 10:17

## 2023-10-16 RX ADMIN — METOPROLOL SUCCINATE 25 MG: 50 TABLET, EXTENDED RELEASE ORAL at 10:17

## 2023-10-16 RX ADMIN — BRIMONIDINE TARTRATE 1 DROP: 2 SOLUTION/ DROPS OPHTHALMIC at 10:18

## 2023-10-16 RX ADMIN — POTASSIUM CHLORIDE 10 MEQ: 750 TABLET, EXTENDED RELEASE ORAL at 10:17

## 2023-10-16 RX ADMIN — OMEPRAZOLE 20 MG: 20 CAPSULE, DELAYED RELEASE ORAL at 05:28

## 2023-10-16 RX ADMIN — FUROSEMIDE 40 MG: 40 TABLET ORAL at 10:17

## 2023-10-16 RX ADMIN — LEVOTHYROXINE SODIUM 50 MCG: 0.05 TABLET ORAL at 05:28

## 2023-10-16 RX ADMIN — SODIUM CHLORIDE, PRESERVATIVE FREE 10 ML: 5 INJECTION INTRAVENOUS at 10:18

## 2023-10-16 ASSESSMENT — ENCOUNTER SYMPTOMS
GASTROINTESTINAL NEGATIVE: 1
RESPIRATORY NEGATIVE: 1

## 2023-10-16 NOTE — PLAN OF CARE
Patient has been cooperative today. Patient has sat up in the chair most of the day. Worked with PT/OT. Recommending SNF. Patient declined SNF. Needs assistance up from chair and lacking stamina upon exertion. Wound nurse in to dress wounds. Wound orders placed. Blisters are improving since probably cause, Bumex has been discontinued. Antibiotics, vanco, has been discontinued. Problem: Discharge Planning  Goal: Discharge to home or other facility with appropriate resources  Outcome: Progressing     Problem: Pain  Goal: Verbalizes/displays adequate comfort level or baseline comfort level  Outcome: Progressing     Problem: Safety - Adult  Goal: Free from fall injury  Outcome: Progressing     Problem: ABCDS Injury Assessment  Goal: Absence of physical injury  Outcome: Progressing     Problem: Skin/Tissue Integrity  Goal: Absence of new skin breakdown  Description: 1. Monitor for areas of redness and/or skin breakdown  2. Assess vascular access sites hourly  3. Every 4-6 hours minimum:  Change oxygen saturation probe site  4. Every 4-6 hours:  If on nasal continuous positive airway pressure, respiratory therapy assess nares and determine need for appliance change or resting period.   Outcome: Progressing     Problem: Chronic Conditions and Co-morbidities  Goal: Patient's chronic conditions and co-morbidity symptoms are monitored and maintained or improved  Outcome: Progressing     Problem: Nutrition Deficit:  Goal: Optimize nutritional status  Outcome: Progressing
Patient was free from falls, worked wit PT/OT. Patient denies pain except for when up and walking d/t blisters. She was evaluated by ID, wound cultures taken and pt was started on vanco. Cardiology switched some of her meds that couldve been concerning for allergy.   Problem: Safety - Adult  Goal: Free from fall injury  10/14/2023 1554 by Estephania Nolan RN  Outcome: Progressing     Problem: Chronic Conditions and Co-morbidities  Goal: Patient's chronic conditions and co-morbidity symptoms are monitored and maintained or improved  10/14/2023 1554 by Estephania Nolan RN  Outcome: Progressing     Problem: Pain  Goal: Verbalizes/displays adequate comfort level or baseline comfort level  10/14/2023 1554 by Estephania Nolan RN  Outcome: Progressing
Problem: Discharge Planning  Goal: Discharge to home or other facility with appropriate resources  Outcome: Progressing  Flowsheets (Taken 10/15/2023 2000)  Discharge to home or other facility with appropriate resources:   Identify barriers to discharge with patient and caregiver   Arrange for needed discharge resources and transportation as appropriate   Identify discharge learning needs (meds, wound care, etc)     Problem: Pain  Goal: Verbalizes/displays adequate comfort level or baseline comfort level  10/15/2023 2308 by Nahomi Davison RN  Outcome: Progressing     Problem: Safety - Adult  Goal: Free from fall injury  10/15/2023 2308 by Nahomi Davison RN  Outcome: Progressing     Problem: ABCDS Injury Assessment  Goal: Absence of physical injury  Outcome: Progressing     Problem: Skin/Tissue Integrity  Goal: Absence of new skin breakdown  Description: 1. Monitor for areas of redness and/or skin breakdown  2. Assess vascular access sites hourly  3. Every 4-6 hours minimum:  Change oxygen saturation probe site  4. Every 4-6 hours:  If on nasal continuous positive airway pressure, respiratory therapy assess nares and determine need for appliance change or resting period.   10/15/2023 2308 by Nahomi Davison RN  Outcome: Progressing     Problem: Chronic Conditions and Co-morbidities  Goal: Patient's chronic conditions and co-morbidity symptoms are monitored and maintained or improved  10/15/2023 2308 by Nahomi Davison RN  Outcome: Progressing  Flowsheets (Taken 10/15/2023 2000)  Care Plan - Patient's Chronic Conditions and Co-Morbidity Symptoms are Monitored and Maintained or Improved:   Monitor and assess patient's chronic conditions and comorbid symptoms for stability, deterioration, or improvement   Collaborate with multidisciplinary team to address chronic and comorbid conditions and prevent exacerbation or deterioration   Update acute care plan with appropriate goals if chronic or comorbid symptoms are
Problem: Pain  Goal: Verbalizes/displays adequate comfort level or baseline comfort level  Outcome: Progressing     Problem: Safety - Adult  Goal: Free from fall injury  Outcome: Progressing     Problem: Skin/Tissue Integrity  Goal: Absence of new skin breakdown  Description: 1. Monitor for areas of redness and/or skin breakdown  2. Assess vascular access sites hourly  3. Every 4-6 hours minimum:  Change oxygen saturation probe site  4. Every 4-6 hours:  If on nasal continuous positive airway pressure, respiratory therapy assess nares and determine need for appliance change or resting period.   Outcome: Progressing     Problem: Chronic Conditions and Co-morbidities  Goal: Patient's chronic conditions and co-morbidity symptoms are monitored and maintained or improved  Outcome: Progressing
co-morbidity symptoms are monitored and maintained or improved  Outcome: Progressing
prn basis. Hygiene care being completed independently per patient; assistance provided when deemed necessary. Problem: Chronic Conditions and Co-morbidities  Goal: Patient's chronic conditions and co-morbidity symptoms are monitored and maintained or improved  10/14/2023 2246 by Georgia Mukherjee RN  Outcome: Progressing  10/14/2023 1554 by Tanner Colunga RN  Outcome: Progressing     Problem: Nutrition Deficit:  Goal: Optimize nutritional status  Outcome: Progressing  Note: Review diet daily and as needed with pt. Provide supplement nutrition as ordered by physician & educate pt. Review nutritional guidelines with pt.

## 2023-10-16 NOTE — DISCHARGE INSTR - COC
Continuity of Care Form    Patient Name: Jaki Wyatt   :  1935  MRN:  [id number]    Admit date:  10/13/2023  Discharge date:  10/16/2023    Code Status Order: Full Code   Advance Directives:     Admitting Physician:  Meme Scales DO  PCP: Santos Anderson, APRN - CNP    Discharging Nurse: Fuad Henao [facility] Unit/Room#: 3590/1034-29  Discharging Unit Phone Number: [phone]    Emergency Contact:   Extended Emergency Contact Information  Primary Emergency Contact: Denisse Brewer  Home Phone: [phone]  Relation: Child   needed? No  Secondary Emergency Contact: [address] [place] [address] Phone: [phone]  Relation: Child   needed?  No    Past Surgical History:  Past Surgical History:   Procedure Laterality Date    CORONARY ANGIOPLASTY WITH STENT PLACEMENT      TOTAL HIP ARTHROPLASTY         Immunization History:   Immunization History   Administered Date(s) Administered    COVID-19, PFIZER PURPLE top, DILUTE for use, (age 15 y+), 30mcg/0.3mL 2021, 2021    Influenza, FLUAD, (age 72 y+), Adjuvanted, 0.5mL 11/10/2022    Pneumococcal, PCV-13, PREVNAR 15, (age 6w+), IM, 0.5mL 10/31/2016    Pneumococcal, PPSV23, PNEUMOVAX 21, (age 2y+), SC/IM, 0.5mL 2022    TDaP, ADACEL (age 6y-58y), [place] (age 10y+), IM, 0.5mL 10/31/2016    Zoster Live (Zostavax) 2016    Zoster Recombinant (Shingrix) 02/10/2022       Active Problems:  Patient Active Problem List   Diagnosis Code    Open wound of left lower leg S81.802A    Unilateral primary osteoarthritis, left knee M17.12    Acute on chronic systolic heart failure (HCC) I50.23    Bilateral leg edema R60.0    Hyponatremia E87.1    Hyperlipemia E78.5    Primary hypertension I10    Longstanding persistent atrial fibrillation (HCC) I48.11    Exertional dyspnea R06.09    Acute respiratory failure with hypoxia (HCC) J96.01    Bronchitis J40    Acute kidney injury superimposed on chronic kidney disease ([address]) N17.9,

## 2023-10-16 NOTE — DISCHARGE SUMMARY
[address] [facility], [address] [place], [place] [address]      Phone: [phone]   omeprazole 20 MG delayed release capsule         No discharge procedures on file. Time Spent on discharge is   28 minutes  in patient examination, evaluation, counseling as well as medication reconciliation, prescriptions for required medications, discharge plan and follow up. Electronically signed by   Pal Henry DO  10/16/2023  2:56 PM      Thank you FLORIAN Billy - YOLI for the opportunity to be involved in this patient's care.

## 2023-10-16 NOTE — CARE COORDINATION
Social work: Met with patient and her caregiver at bedside to discuss [employer], as PT/OT are recommending snf. Patient states her plan is to go [facility] with [facility] and will not consider SNF.

## 2023-10-17 ENCOUNTER — TELEPHONE (OUTPATIENT)
Dept: FAMILY MEDICINE CLINIC | Age: 88
End: 2023-10-17

## 2023-10-17 NOTE — TELEPHONE ENCOUNTER
Care Transitions Initial Follow Up Call    Outreach made within 2 business days of discharge: Yes    Patient: Graham Jones Patient : 1935   MRN: [id number]  Reason for Admission: There are no discharge diagnoses documented for the most recent discharge. Discharge Date: 10/16/23       Spoke with: Deandre Busch     Discharge department/facility: [address] [place] [address] Interactive Patient Contact:  Was patient able to fill all prescriptions: Yes  Was patient instructed to bring all medications to the follow-up visit: Yes  Is patient taking all medications as directed in the discharge summary? Yes  Does patient understand their discharge instructions: Yes  Does patient have questions or concerns that need addressed prior to 7-14 day follow up office visit: yes -     Scheduled appointment with PCP within 7-14 days    Follow Up  No future appointments.     Kacey Loya MA

## 2023-10-18 ENCOUNTER — OFFICE VISIT (OUTPATIENT)
Dept: FAMILY MEDICINE CLINIC | Age: 88
End: 2023-10-18
Payer: COMMERCIAL

## 2023-10-18 VITALS
DIASTOLIC BLOOD PRESSURE: 72 MMHG | TEMPERATURE: 97.4 F | HEART RATE: 78 BPM | HEIGHT: 60 IN | BODY MASS INDEX: 25.39 KG/M2 | SYSTOLIC BLOOD PRESSURE: 122 MMHG | OXYGEN SATURATION: 94 %

## 2023-10-18 DIAGNOSIS — E03.9 ACQUIRED HYPOTHYROIDISM: Primary | ICD-10-CM

## 2023-10-18 DIAGNOSIS — M19.041 PRIMARY OSTEOARTHRITIS OF BOTH HANDS: ICD-10-CM

## 2023-10-18 DIAGNOSIS — M19.042 PRIMARY OSTEOARTHRITIS OF BOTH HANDS: ICD-10-CM

## 2023-10-18 DIAGNOSIS — Z09 HOSPITAL DISCHARGE FOLLOW-UP: ICD-10-CM

## 2023-10-18 LAB
ANA SER QL IA: NEGATIVE
DSDNA IGG SER QL IA: 0.8 IU/ML
NUCLEAR IGG SER IA-RTO: 0.2 U/ML

## 2023-10-18 PROCEDURE — 1111F DSCHRG MED/CURRENT MED MERGE: CPT | Performed by: NURSE PRACTITIONER

## 2023-10-18 PROCEDURE — 1123F ACP DISCUSS/DSCN MKR DOCD: CPT | Performed by: NURSE PRACTITIONER

## 2023-10-18 PROCEDURE — 3078F DIAST BP <80 MM HG: CPT | Performed by: NURSE PRACTITIONER

## 2023-10-18 PROCEDURE — 99213 OFFICE O/P EST LOW 20 MIN: CPT | Performed by: NURSE PRACTITIONER

## 2023-10-18 PROCEDURE — 3074F SYST BP LT 130 MM HG: CPT | Performed by: NURSE PRACTITIONER

## 2023-10-18 RX ORDER — LEVOTHYROXINE SODIUM 0.07 MG/1
75 TABLET ORAL DAILY
Qty: 30 TABLET | Refills: 2 | Status: SHIPPED | OUTPATIENT
Start: 2023-10-18

## 2023-10-18 NOTE — PROGRESS NOTES
Post-Discharge Transitional Care  Follow Up      Matteo Ott   YOB: 1935    Date of Office Visit:  10/18/2023  Date of Hospital Admission: 10/13/23  Date of Hospital Discharge: 10/16/23  Risk of hospital readmission (high >=14%. Medium >=10%) :Readmission Risk Score: 23.7      Care management risk score Rising risk (score 2-5) and Complex Care (Scores >=6): No Risk Score On File     Non face to face  following discharge, date last encounter closed (first attempt may have been earlier): 10/17/2023    Call initiated 2 business days of discharge: Yes    ASSESSMENT/PLAN:   Acquired hypothyroidism  Lab Results   Component Value Date    TSH 8.53 (H) 10/13/2023     Remains uncontrolled despite normal dosing daily  Increase dose and recheck labs in 6 weeks, take on empty stomach    -     levothyroxine (SYNTHROID) 75 MCG tablet; Take 1 tablet by mouth daily, Disp-30 tablet, R-2Normal  -     TSH; Future  -     T4, Free; Future  Primary osteoarthritis of both hands  Ok for tylenol arthritis 2-3 times daily and voltaren gel 3-4 times daily  Avoid po nsaids  Ok for heat./ice if needed    Hospital discharge follow-up  -     CO DISCHARGE MEDS RECONCILED W/ CURRENT OUTPATIENT MED LIST      Medical Decision Making: moderate complexity  No follow-ups on file. Please note that this chart was generated using voice recognition Dragon dictation software. Although every effort was made to ensure the accuracy of this automated transcription, some errors in transcription may have occurred. Interpretation Summary         Occluded right internal carotid artery. Mild (<50%) stenosis in the left internal carotid artery by velocity criteria. Heterogeneous plaque in the left internal carotid artery. Normal antegrade flow involving the right vertebral artery. Normal antegrade flow involving the left vertebral artery. Limited visualization at the left carotid bifurcation secondary to acoustic shadowing.
mouth daily 30 tablet 3    empagliflozin (JARDIANCE) 10 MG tablet Take 1 tablet by mouth daily 30 tablet 3    losartan (COZAAR) 25 MG tablet Take 1 tablet by mouth daily 90 tablet 1    levothyroxine (SYNTHROID) 50 MCG tablet Take 1 tablet by mouth Daily 30 tablet 3    NONFORMULARY Take 1 capsule by mouth in the morning and 1 capsule in the evening. OTC supplement [employer]. atorvastatin (LIPITOR) 80 MG tablet TAKE ONE TABLET BY MOUTH DAILY 30 tablet 5    apixaban (ELIQUIS) 5 MG TABS tablet Take 1 tablet by mouth 2 times daily      potassium chloride (KLOR-CON M) 10 MEQ extended release tablet TAKE TWO TABLETS BY MOUTH DAILY (Patient taking differently: 1 tablet daily TAKE TWO TABLETS BY MOUTH DAILY) 60 tablet 5    Multiple Vitamins-Minerals (THERAPEUTIC MULTIVITAMIN-MINERALS) tablet Take 1 tablet by mouth daily      Magnesium Gluconate 250 MG TABS Take 250 mg by mouth daily      nitroGLYCERIN (NITROSTAT) 0.4 MG SL tablet Place 1 tablet under the tongue every 5 minutes as needed for Chest pain 25 tablet 5    latanoprost (XALATAN) 0.005 % ophthalmic solution Place 1 drop into both eyes daily (Patient taking differently: Place 1 drop into both eyes nightly) 1 Bottle 3    brimonidine (ALPHAGAN) 0.2 % ophthalmic solution Place 1 drop into the left eye in the morning and 1 drop in the evening. No current facility-administered medications for this visit.      Allergies   Allergen Reactions    Amiodarone Rash    Bactrim [Sulfamethoxazole-Trimethoprim]        Health Maintenance   Topic Date Due    COVID-19 Vaccine (3 - Pfizer series) 04/01/2021    Shingles vaccine (3 of 3) 04/07/2022    Flu vaccine (1) 08/01/2023    DEXA (modify frequency per FRAX score)  07/12/2024 (Originally 12/1/1992)    Depression Screen  07/12/2024    Lipids  10/14/2024    DTaP/Tdap/Td vaccine (2 - Td or Tdap) 10/31/2026    Pneumococcal 65+ years Vaccine  Completed    Hepatitis A vaccine  Aged Out    Hepatitis B vaccine  Aged Out    Hib

## 2023-10-19 LAB
MICROORGANISM SPEC CULT: ABNORMAL
MICROORGANISM SPEC CULT: NORMAL
MICROORGANISM/AGENT SPEC: ABNORMAL
MICROORGANISM/AGENT SPEC: ABNORMAL
SERVICE CMNT-IMP: NORMAL
SPECIMEN DESCRIPTION: ABNORMAL
SPECIMEN DESCRIPTION: NORMAL

## 2023-10-20 ENCOUNTER — TELEPHONE (OUTPATIENT)
Dept: FAMILY MEDICINE CLINIC | Age: 88
End: 2023-10-20

## 2023-10-20 LAB
MICROORGANISM SPEC CULT: NORMAL
SERVICE CMNT-IMP: NORMAL
SPECIMEN DESCRIPTION: NORMAL

## 2023-10-20 NOTE — TELEPHONE ENCOUNTER
Rosendo Huizar called and stated that patients bp as 89/56 at 11 and then took midodrine, they then took pt bp at 2pm and it was 82/51. Writer spoke with carolina and this was her response. [2:20 PM] Mariama Sloan    would have her hold her losartan for now and call her cardiologist for more guidance and push water intake    Rosendo Huizar informed and will call patient and advise.

## 2023-10-27 PROBLEM — I50.43 ACUTE ON CHRONIC COMBINED SYSTOLIC (CONGESTIVE) AND DIASTOLIC (CONGESTIVE) HEART FAILURE (HCC): Status: ACTIVE | Noted: 2023-01-01

## 2023-10-27 PROBLEM — R53.81 MALAISE AND FATIGUE: Status: ACTIVE | Noted: 2023-01-01

## 2023-10-27 PROBLEM — R53.83 MALAISE AND FATIGUE: Status: ACTIVE | Noted: 2023-01-01

## 2023-10-27 NOTE — H&P
Constitutional:  Positive for activity change and fatigue. Negative for chills and fever.   HENT:  Negative for sinus pressure and sinus pain.    Eyes:  Negative for photophobia and visual disturbance.   Respiratory:  Positive for cough and shortness of breath. Negative for wheezing.    Cardiovascular:  Positive for palpitations and leg swelling. Negative for chest pain.   Gastrointestinal:  Positive for abdominal distention and abdominal pain. Negative for constipation, diarrhea, nausea and vomiting.   Endocrine: Negative for cold intolerance, heat intolerance and polyuria.   Genitourinary:  Positive for frequency. Negative for difficulty urinating and urgency.   Musculoskeletal:  Negative for arthralgias and myalgias.   Skin:  Positive for pallor. Negative for color change and wound.   Neurological:  Negative for dizziness, syncope, weakness and light-headedness.   Hematological:  Negative for adenopathy. Does not bruise/bleed easily.   Psychiatric/Behavioral:  Negative for agitation and confusion. The patient is not nervous/anxious.        Physical Exam:   /63   Pulse 85   Temp 98.2 °F (36.8 °C)   Resp 25   Ht 1.524 m (5')   Wt 59 kg (130 lb)   SpO2 98%   BMI 25.39 kg/m²   Temp (24hrs), Av.2 °F (36.8 °C), Min:98.2 °F (36.8 °C), Max:98.2 °F (36.8 °C)    No results for input(s): \"POCGLU\" in the last 72 hours.  No intake or output data in the 24 hours ending 10/27/23 1836    Physical Exam  Constitutional:       General: She is in acute distress.      Appearance: She is obese. She is ill-appearing.   HENT:      Head: Normocephalic and atraumatic.      Nose: Nose normal.      Mouth/Throat:      Mouth: Mucous membranes are moist.   Eyes:      Extraocular Movements: Extraocular movements intact.      Pupils: Pupils are equal, round, and reactive to light.   Cardiovascular:      Rate and Rhythm: Tachycardia present. Rhythm irregular.      Pulses: Normal pulses.      Heart sounds: Normal heart sounds.  Pulmonary:      Effort: Respiratory distress present. Breath sounds: Rhonchi and rales present. Abdominal:      General: Abdomen is flat. Bowel sounds are normal. There is no distension. Palpations: Abdomen is soft. Tenderness: There is no abdominal tenderness. Musculoskeletal:         General: Normal range of motion. Cervical back: Normal range of motion and neck supple. Right lower leg: Edema (1) present. Left lower leg: Edema (1) present. Skin:     General: Skin is warm and dry. Capillary Refill: Capillary refill takes 2 to 3 seconds. Coloration: Skin is pale. Neurological:      Mental Status: She is alert.    Psychiatric:         Mood and Affect: Mood normal.         Behavior: Behavior normal.         Investigations:      Laboratory Testing:  Recent Results (from the past 24 hour(s))   EKG 12 Lead    Collection Time: 10/27/23  3:55 PM   Result Value Ref Range    Ventricular Rate 106 BPM    Atrial Rate 119 BPM    QRS Duration 72 ms    Q-T Interval 342 ms    QTc Calculation (Bazett) 454 ms    R Axis 65 degrees    T Axis 63 degrees   Basic Metabolic Panel    Collection Time: 10/27/23  4:00 PM   Result Value Ref Range    Sodium 132 (L) 135 - 144 mmol/L    Potassium 5.2 3.7 - 5.3 mmol/L    Chloride 96 (L) 98 - 107 mmol/L    CO2 26 20 - 31 mmol/L    Anion Gap 10 9 - 17 mmol/L    Glucose 90 70 - 99 mg/dL    BUN 28 (H) 8 - 23 mg/dL    Creatinine 0.5 0.5 - 0.9 mg/dL    Est, Glom Filt Rate >60 >60 mL/min/1.73m2    Bun/Cre Ratio 56 (H) 9 - 20    Calcium 8.9 8.6 - 10.4 mg/dL   CBC with Auto Differential    Collection Time: 10/27/23  4:00 PM   Result Value Ref Range    WBC 9.4 3.5 - 11.3 k/uL    RBC 4.08 3.95 - 5.11 m/uL    Hemoglobin 12.6 11.9 - 15.1 g/dL    Hematocrit 40.9 36.3 - 47.1 %    .2 82.6 - 102.9 fL    MCH 30.9 25.2 - 33.5 pg    MCHC 30.8 28.4 - 34.8 g/dL    RDW 16.9 (H) 11.8 - 14.4 %    Platelets 659 129 - 827 k/uL    MPV 9.9 8.1 - 13.5 fL    NRBC Automated

## 2023-10-27 NOTE — ED PROVIDER NOTES
EMERGENCY DEPARTMENT ENCOUNTER    Pt Name: Jenn White  MRN: [id number]  Birthdate 1935  Date of evaluation: 10/27/23  CHIEF COMPLAINT       Chief Complaint   Patient presents with    Fatigue     Progressive since August, consult with hospice on Sunday  Had 1500 appt with pcp today but was unable to stand to get into car.      HISTORY OF PRESENT ILLNESS   This is an 87-year-old female who presents with complaints           REVIEW OF SYSTEMS     Review of Systems   Constitutional:  Positive for activity change and fatigue. Negative for chills and fever.   HENT:  Negative for rhinorrhea and sore throat.    Eyes:  Negative for discharge, redness and visual disturbance.   Respiratory:  Negative for cough and shortness of breath.    Cardiovascular:  Negative for chest pain, palpitations and leg swelling.   Gastrointestinal:  Negative for diarrhea, nausea and vomiting.   Genitourinary:  Negative for dysuria and hematuria.   Musculoskeletal:  Negative for arthralgias, myalgias and neck pain.   Skin:  Negative for color change and rash.   Neurological:  Positive for weakness. Negative for seizures and headaches.   Psychiatric/Behavioral:  Negative for hallucinations, self-injury and suicidal ideas.      PASTMEDICAL HISTORY     Past Medical History:   Diagnosis Date    Bowel obstruction ([facility])     Broken hip ([facility])     CKD (chronic kidney disease) 10/13/2023    Diverticulitis     DVT (deep venous thrombosis) ([facility])     Heart attack ([facility])     MRSA (methicillin resistant staph aureus) culture positive      Past Problem List  Patient Active Problem List   Diagnosis Code    Open wound of left lower leg S81.802A    Unilateral primary osteoarthritis, left knee M17.12    Acute on chronic systolic heart failure (HCC) I50.23    Bilateral leg edema R60.0    Hyponatremia E87.1    Hyperlipemia E78.5    Primary hypertension I10    Longstanding persistent atrial fibrillation ([facility]) I48.11    Exertional dyspnea R06.09    Acute respiratory  PROCEDURES:    Procedures      DATA FOR LAB AND RADIOLOGY TESTS ORDERED BELOW ARE REVIEWED BY THE ED CLINICIAN:    RADIOLOGY: All x-rays, CT, MRI, and formal ultrasound images (except ED bedside ultrasound) are read by the radiologist, see reports below, unless otherwise noted in MDM or here. Reports below are reviewed by myself. No orders to display       LABS: Lab orders shown below, the results are reviewed by myself, and all abnormals are listed below. Labs Reviewed   BASIC METABOLIC PANEL - Abnormal; Notable for the following components:       Result Value    Sodium 132 (*)     Chloride 96 (*)     BUN 28 (*)     Bun/Cre Ratio 56 (*)     All other components within normal limits   CBC WITH AUTO DIFFERENTIAL - Abnormal; Notable for the following components:    RDW 16.9 (*)     Neutrophils % 73 (*)     Lymphocytes % 17 (*)     All other components within normal limits   TSH WITH REFLEX - Abnormal; Notable for the following components:    TSH 8.35 (*)     All other components within normal limits   LIPASE - Abnormal; Notable for the following components:    Lipase 81 (*)     All other components within normal limits   HEPATIC FUNCTION PANEL - Abnormal; Notable for the following components:    Albumin 2.2 (*)     Alkaline Phosphatase 189 (*)      (*)      (*)     Total Bilirubin 1.3 (*)     Bilirubin, Direct 0.4 (*)     All other components within normal limits   COVID-19 & INFLUENZA COMBO   MAGNESIUM   URINALYSIS   T4, FREE       Vitals Reviewed:    Vitals:    10/27/23 1600 10/27/23 1611 10/27/23 1633 10/27/23 1705   BP: 95/79      Pulse:  98 (!) 111 97   Resp: 19 22 27 24   Temp:       SpO2: 95% 95% 99% 96%   Weight:       Height:         MEDICATIONS GIVEN TO PATIENT THIS ENCOUNTER:  No orders of the defined types were placed in this encounter.     DISCHARGE PRESCRIPTIONS:  New Prescriptions    No medications on file     PHYSICIAN CONSULTS ORDERED THIS ENCOUNTER:  IP CONSULT TO

## 2023-10-28 NOTE — PROGRESS NOTES
[facility]  Office: [address], DO, Mary Pisano, DO, Leo Palafox, DO, Natty Ferguson Blood, DO, Blade Carreno MD, Moose Minor MD, Trey Craven MD, Madisyn Patel MD,  Alisa Polanco MD, Pretty Raya MD, Juanito Acosta MD,  Marga Quintero MD, Eve Sales MD, Charlesetta Burkitt, DO, Karey Lunsford MD,  James Dias DO, Nina Cai MD, Nevaeh Farmer MD, Jaya Boucher MD, Ute Love MD,  Yong Bamberger, MD, Fay Omer MD, Laura Mancera MD, Osiris Fonseca MD, Bethany Deng MD, Lisa Avitia, DO, Carlos Alberto Jenkins, DO, Janina Veliz MD,  Hardik Aguilera MD, Juan Carlos Dodd, CNP,  Sylvia Chaparro, CNP, Miya Restrepo, CNP,  José Luis Larsen, DNP, Khushboo Irizarry, CNP, Kyara Gutierrez, CNP, Reuben Stoery, CNP, Ragena Rubinstein, CNP, Starr Diaz, CNP, Elpidio Anders, CNP, Kurt Olivera, CNS, Francesca Mireles, CNP, Diana Warner, [address] [facility]    Progress Note    10/28/2023    10:22 AM    Name:   Buck Vidal  MRN:     [id number]     [address]:      [de-identified]   Room:   2010/2010-02  IP Day:  1  Admit Date:  10/27/2023  3:39 PM    PCP:   FLORIAN Freire CNP  Code Status:  DNR-CC    Subjective:     C/C:   Chief Complaint   Patient presents with    Fatigue     Progressive since August, consult with hospice on Sunday  Had 1500 appt with pcp today but was unable to stand to get into car. Interval History Status: improved. Patient feels a little better today, c/o dry mouth as she has been NPO for swallow after she began choking overnight. She was suctioned by RT and feel like she has to clear her throat but cant, nothing comes up. Patient is asking to eat. I did discuss risk of aspiration or respiratory distress if she continues to eat, family present at bedside.  Patient is meeting with hospice tomorrow, she isnt opposed to the swallow study but does not want to be NPO, she and her     Chemistry:  Recent Labs     10/27/23  1600 10/28/23  0549   * 138   K 5.2 3.5*   CL 96* 98   CO2 26 30   GLUCOSE 90 88   BUN 28* 24*   CREATININE 0.5 0.5   MG 1.6 1.5*   ANIONGAP 10 10   LABGLOM >60 >60   CALCIUM 8.9 8.9     Recent Labs     10/27/23  1600   PROT 6.9   LABALBU 2.2*   TSH 8.35*   *   *   ALKPHOS 189*   BILITOT 1.3*   BILIDIR 0.4*   LIPASE 81*     ABG:No results found for: \"POCPH\", \"PHART\", \"PH\", \"POCPCO2\", \"WXC3NBB\", \"PCO2\", \"POCPO2\", \"PO2ART\", \"PO2\", \"POCHCO3\", \"ETT7ZLM\", \"HCO3\", \"NBEA\", \"PBEA\", \"BEART\", \"BE\", \"THGBART\", \"THB\", \"ZLZ7DCX\", \"PEJF3HGK\", \"P7PDLWJN\", \"O2SAT\", \"FIO2\"  Lab Results   Component Value Date/Time    SPECIAL LT HAND 10ML 10/15/2023 05:56 AM    SPECIAL RT HAND 10ML 10/15/2023 05:56 AM     Lab Results   Component Value Date/Time    CULTURE POSITIVE Blood Culture (A) 10/15/2023 05:56 AM    CULTURE  10/15/2023 05:56 AM     DIRECT GRAM STAIN FROM BOTTLE: GRAM POSITIVE COCCI IN CLUSTERS GRAM POSITIVE COCCI IN CHAINS    CULTURE  10/15/2023 05:56 AM     Detected: Streptococcus species (not S. agalactiae (Group B), S. pneumoniae, or S. pyogenes (Group A)) Culture Results to Follow Methodology- Polymerase Chain Reaction (PCR)    CULTURE (A) 10/15/2023 05:56 AM     STREP VIRIDANS GROUP  A single positive blood culture of coagulase negative Staphylocci, diphtheroids,micrococci, Cutibacterium, viridans Streptocci, Bacillus, or Lactobacillus species should be interpreted with caution and viewed as a likely skin contaminant.      CULTURE  10/15/2023 05:56 AM     (NOTE) Direct Gram Stain from bottle and Polymerase Chain Reaction (PCR) results called to and read back by:JACQUIE Aggarwal 10/16/23    CULTURE NO GROWTH 5 DAYS 10/15/2023 05:56 AM       Radiology:  No results found.    Physical Examination:        General appearance:  alert, chronically ill appearing, cooperative and no distress  Mental Status:  oriented to person, place and time and normal affect  Lungs:  rhonchi

## 2023-10-28 NOTE — PROGRESS NOTES
Daphne Banks NP for inter med was in to see the pt. Alaina had a long discussion  with the pt and her family. The pt is npo D/T freq coughing after orals. A swallow study was ordered, prob wont be done until Monday 10/30/23. The pt is a [facility]. She informed Alaina that she would like to [facility]. The of risk were reviewed with the pt and her family. A release of responsibility form was signed and placed in the chart. Alaina did request that resp therapy come and suction her.  Zuleika FREEMAN assessed the pt and determined that the pt did not need suctioned

## 2023-10-28 NOTE — PROGRESS NOTES
SLP ALL NOTES  Facility/Department: [facility] SURG   CLINICAL BEDSIDE SWALLOW EVALUATION    NAME: Adonis Tran  : 1935  MRN: [id number]    ADMISSION DATE: 10/27/2023  ADMITTING DIAGNOSIS: has Open wound of left lower leg; Unilateral primary osteoarthritis, left knee; Acute on chronic systolic heart failure ([address]); Bilateral leg edema; Hyponatremia; Hyperlipemia; Primary hypertension; Longstanding persistent atrial fibrillation ([address]); Exertional dyspnea; Acute respiratory failure with hypoxia ([address]); Bronchitis; Chronic kidney disease; Redness and swelling of lower leg; Hyperkalemia; Elevated TSH; Elevated troponin; Hypotension; Skin ulcer of right calf with fat layer exposed ([address]); Venous stasis ulcer of left calf with fat layer 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 varicose veins ([address]); Aortic valve disorder; Chronic congestive heart failure ([address]); Coronary artery disease involving native coronary artery of native heart without angina pectoris; Ischemic cardiomyopathy; Nonrheumatic aortic valve stenosis; Nonrheumatic mitral valve regurgitation; Paroxysmal atrial fibrillation ([address]); Stage 3a chronic kidney disease ([address]); TIA (transient ischemic attack); Elevated liver enzymes; NSTEMI (non-ST elevated myocardial infarction) ([address]); Blister of skin; Chronic anticoagulation; Mild atherosclerosis of carotid artery, bilateral; Chronic Asymptomatic Internal carotid artery occlusion, right; Slurred speech; Malaise and fatigue; and Acute on chronic combined systolic (congestive) and diastolic (congestive) heart failure ([address]) on their problem list.  ONSET DATE: 10/27/23    Recent Chest Xray/CT of Chest: (CXR Date 10/13/23 )    Date of Eval: 10/28/2023  Evaluating Therapist: Javier Deleon    Current Diet level:  Current Diet : Regular  Current Liquid Diet : Thin    Primary Complaint   Obtained from H&P:  IMPRESSION:  Chronic pulmonary change without acute cardiopulmonary process.     Pain:  Pain Assessment  Pain Assessment: 0-10  Pain Level:

## 2023-10-28 NOTE — ACP (ADVANCE CARE PLANNING)
Advance Care Planning     Advance Care Planning Activator (Inpatient)  Conversation Note      Date of ACP Conversation: 10/28/2023     Conversation Conducted with: Patient with Decision Making Capacity    ACP Activator: Mary Xiong RN    {When Decision Maker makes decisions on behalf of the incapacitated patient: Decision Maker is asked to consider and make decisions based on patient values, known preferences, or best interests.     Health Care Decision Maker:     Current Designated Health Care Decision Maker:     Primary Decision Maker: CindyDerek - Child - [phone]    Secondary Decision Maker: Denisse Brewer - Child - [phone]  Click here to complete Healthcare Decision Makers including section of the Healthcare Decision Maker Relationship (ie \"Primary\")      Care Preferences    Ventilation:  \"If you were in your present state of health and suddenly became very ill and were unable to breathe on your own, what would your preference be about the use of a ventilator (breathing machine) if it were available to you?\"      Would the patient desire the use of ventilator (breathing machine)?: no    \"If your health worsens and it becomes clear that your chance of recovery is unlikely, what would your preference be about the use of a ventilator (breathing machine) if it were available to you?\"     Would the patient desire the use of ventilator (breathing machine)?: No      Resuscitation  \"CPR works best to restart the heart when there is a sudden event, like a heart attack, in someone who is otherwise healthy. Unfortunately, CPR does not typically restart the heart for people who have serious health conditions or who are very sick.\"    \"In the event your heart stopped as a result of an underlying serious health condition, would you want attempts to be made to restart your heart (answer \"yes\" for attempt to resuscitate) or would you prefer a natural death (answer \"no\" for do not attempt to resuscitate)?\"  no       [] Yes   [x] No   Educated Patient / Laine  regarding differences between Advance Directives and portable DNR orders.     Length of ACP Conversation in minutes:  5    Conversation Outcomes:  ACP discussion completed    Follow-up plan:    [] Schedule follow-up conversation to continue planning  [] Referred individual to Provider for additional questions/concerns   [] Advised patient/agent/surrogate to review completed ACP document and update if needed with changes in condition, patient preferences or care setting    [] This note routed to one or more involved healthcare providers         Pt is DNR-CC

## 2023-10-28 NOTE — CARE COORDINATION
Case Management Assessment  Initial Evaluation    Date/Time of Evaluation: 10/28/2023 10:58 AM  Assessment Completed by: Twila Guido RN    If patient is discharged prior to next notation, then this note serves as note for discharge by case management. Patient Name: Jhonny Weber                   YOB: 1935  Diagnosis: Malaise and fatigue [R53.81, R53.83]  Generalized weakness [R53.1]  Acute on chronic combined systolic (congestive) and diastolic (congestive) heart failure ([address]) [I50.43]                   Date / Time: 10/27/2023  3:39 PM    Patient Admission Status: Inpatient   Readmission Risk (Low < 19, Mod (19-27), High > 27): Readmission Risk Score: 20.4    Current PCP: FLORIAN Rbuio CNP  PCP verified by CM? Yes    Chart Reviewed: Yes      History Provided by: Patient, Child/Family  Patient Orientation: Alert and Oriented    Patient Cognition: Alert    Hospitalization in the last 30 days (Readmission):  Yes    If yes, Readmission Assessment in  Navigator will be completed. Advance Directives:      Code Status: DNR-CC   Patient's Primary Decision Maker is: Legal Next of Kin    Primary Decision MakerVernimelissa De La Cruz Child - [phone]    Secondary Decision Maker:  Denisse Brewer  Child - [phone]    Discharge Planning:    Patient lives with: Children Type of Home: House  Primary Care Giver: Family  Patient Support Systems include: [employer], Friends/Neighbors, Children, Family Members, Home Care Staff   Current Financial resources: Medicare  Current community resources: Hospice (Referral to [facility])  Current services prior to admission: Home Care            Current DME: Hospital Bed, Shower Chair, Walker, Other (Comment) (lift chair, raised toilet seat with handles)            Type of Home Care services:  Hospice    ADLS  Prior functional level: Assistance with the following:, Bathing, Dressing, Toileting, Feeding, Cooking, Housework, Shopping,

## 2023-10-28 NOTE — PROGRESS NOTES
The pt was discharged to home. The discharge instructions were reviewed and given to the pt.  She was escorted to the exit in stable condition

## 2023-10-28 NOTE — PROGRESS NOTES
Gave Midodrine to patient crushed and in apple sauce, patient stated chocking. Called for suction supplies and started to suction patient. Called respiratory to assist with suctioning. He was able to get better suctioning. Patient seems to have settled down. Respiratory thought that patient would benefit from throat lozenge for irritation. Patient instructed to cough deeply, but is unable to. An order for swallow study was placed for the patient. Will continue to monitor.

## 2023-10-28 NOTE — PLAN OF CARE
Patient came in with Fatigue. Patient has been unable to ambulate with a walker. Patient is incontinent. Patient is using a purewick. Patient has mentioned that she has trouble swallowing. RN crushed her medications and put them in Applesauce. Patient started choking on the applesauce. We suctioned her out and called respiratory to suction her out further. Patient seems to be ok now. NP made patient NPO for now. A swallow study was ordered for this patient. Also Throat lozenges were ordered for throat irritation. Patient feels that they helped. Patient has a meeting with Hospice on Sunday. Plan of care ongoing with this patient. Siderails up x 2  Hourly rounding  Call light in reach  Instructed to call for assist before attempting out of bed.   Remains free from falls and accidental injury at this time   Floor free from obstacles  Bed is locked and in lowest position  Adequate lighting provided  Bed alarm on, Red Falling star and Stay with Me signs posted      Problem: Safety - Adult  Goal: Free from fall injury  Outcome: Progressing     Problem: ABCDS Injury Assessment  Goal: Absence of physical injury  Outcome: Progressing     Problem: Musculoskeletal - Adult  Goal: Return mobility to safest level of function  Outcome: Progressing  Goal: Maintain proper alignment of affected body part  Outcome: Progressing  Goal: Return ADL status to a safe level of function  Outcome: Progressing     Problem: Metabolic/Fluid and Electrolytes - Adult  Goal: Electrolytes maintained within normal limits  Outcome: Progressing  Goal: Hemodynamic stability and optimal renal function maintained  Outcome: Progressing  Goal: Glucose maintained within prescribed range  Outcome: Progressing     Problem: Genitourinary - Adult  Goal: Absence of urinary retention  Outcome: Progressing  Goal: Urinary catheter remains patent  Outcome: Progressing     Problem: Cardiovascular - Adult  Goal: Maintains optimal cardiac output and hemodynamic stability  Outcome: Progressing  Goal: Absence of cardiac dysrhythmias or at baseline  Outcome: Progressing     Problem: Discharge Planning  Goal: Discharge to home or other facility with appropriate resources  Outcome: Progressing  Flowsheets  Taken 10/27/2023 2021  Discharge to home or other facility with appropriate resources:   Identify barriers to discharge with patient and caregiver   Identify discharge learning needs (meds, wound care, etc)   Refer to discharge planning if patient needs post-hospital services based on physician order or complex needs related to functional status, cognitive ability or social support system  Taken 10/27/2023 2006  Discharge to home or other facility with appropriate resources:   Identify barriers to discharge with patient and caregiver   Arrange for needed discharge resources and transportation as appropriate   Identify discharge learning needs (meds, wound care, etc)     Checked for incontinence every 2 hours and prn.  Pericare as needed.  Assisted to reposition off back frequently.  On waffle mattress.  Heels off bed with pillows.    Problem: Skin/Tissue Integrity  Goal: Absence of new skin breakdown  Description: 1.  Monitor for areas of redness and/or skin breakdown  2.  Assess vascular access sites hourly  3.  Every 4-6 hours minimum:  Change oxygen saturation probe site  4.  Every 4-6 hours:  If on nasal continuous positive airway pressure, respiratory therapy assess nares and determine need for appliance change or resting period.  Outcome: Progressing

## 2023-10-28 NOTE — PLAN OF CARE
Problem: Discharge Planning  Goal: Discharge to home or other facility with appropriate resources  10/28/2023 1434 by Deedee Ash RN  Outcome: Completed  10/28/2023 0411 by Telly Duenas RN  Outcome: Progressing  Flowsheets  Taken 10/27/2023 2021  Discharge to home or other facility with appropriate resources:   Identify barriers to discharge with patient and caregiver   Identify discharge learning needs (meds, wound care, etc)   Refer to discharge planning if patient needs post-hospital services based on physician order or complex needs related to functional status, cognitive ability or social support system  Taken 10/27/2023 2006  Discharge to home or other facility with appropriate resources:   Identify barriers to discharge with patient and caregiver   Arrange for needed discharge resources and transportation as appropriate   Identify discharge learning needs (meds, wound care, etc)     Problem: Safety - Adult  Goal: Free from fall injury  10/28/2023 1434 by Deedee Ash RN  Outcome: Completed  10/28/2023 0411 by Telly Duenas RN  Outcome: Progressing     Problem: ABCDS Injury Assessment  Goal: Absence of physical injury  10/28/2023 0411 by Telly Duenas RN  Outcome: Progressing     Problem: Skin/Tissue Integrity  Goal: Absence of new skin breakdown  Description: 1. Monitor for areas of redness and/or skin breakdown  2. Assess vascular access sites hourly  3. Every 4-6 hours minimum:  Change oxygen saturation probe site  4. Every 4-6 hours:  If on nasal continuous positive airway pressure, respiratory therapy assess nares and determine need for appliance change or resting period.   10/28/2023 0411 by Telly Duenas RN  Outcome: Progressing     Problem: Cardiovascular - Adult  Goal: Maintains optimal cardiac output and hemodynamic stability  10/28/2023 1434 by Deedee Ash RN  Outcome: Completed  10/28/2023 0411 by Telly Duenas RN  Outcome: Progressing  Goal: Absence Completed  10/28/2023 0411 by Evangelina Dietrich RN  Outcome: Progressing     Problem: Metabolic/Fluid and Electrolytes - Adult  Goal: Electrolytes maintained within normal limits  10/28/2023 1434 by Kaye Mooney RN  Outcome: Completed  10/28/2023 0411 by Evangelina Dietrich RN  Outcome: Progressing  Goal: Hemodynamic stability and optimal renal function maintained  10/28/2023 1434 by Kaye Mooney RN  Outcome: Completed  10/28/2023 0411 by Evangelina Dietrich RN  Outcome: Progressing  Goal: Glucose maintained within prescribed range  10/28/2023 1434 by Kaye Mooney RN  Outcome: Completed  10/28/2023 0411 by Evangelina Dietrich RN  Outcome: Progressing     Problem: Pain  Goal: Verbalizes/displays adequate comfort level or baseline comfort level  Outcome: Completed  Flowsheets (Taken 10/28/2023 0308 by Evangelina Dietrich, RN)  Verbalizes/displays adequate comfort level or baseline comfort level:   Encourage patient to monitor pain and request assistance   Assess pain using appropriate pain scale   Administer analgesics based on type and severity of pain and evaluate response

## 2023-10-28 NOTE — PROGRESS NOTES
Transitions of Care Pharmacy Service   Medication Review    The patient's list of current home medications has been reviewed and updated. Source(s) of information: Patient and family, discharge summary from 10/16, and Surescripts - [facility] and [facility]    Please note:   -Patient was taking cefadroxil 500mg BID and doxycycline 100mg BID from 10/9-10/13 for rash on her neck but she states she was told to stop these abx as they were not helping the rash  -She was told not to take the Jardiance until office visit with cardiologist Dr. Myranda Hudson which was supposed to be yesterday; however [facility] so missed appt  -She has not been taking losartan due to low BP    Please feel free to call with any questions about this encounter. Thank you. Radha Roper, [facility]  Transitions of Care Pharmacy Service  Phone:  [phone]  Fax: [phone]        Prior to Admission medications    Medication Sig Start Date End Date Taking? Authorizing Provider   midodrine (PROAMATINE) 2.5 MG tablet Take 1 tablet by mouth 2 times daily as needed (SBP < 110)   Yes ProviderLisa MD   potassium chloride (MICRO-K) 10 MEQ extended release capsule Take 2 capsules by mouth daily   Yes ProviderLisa MD   levothyroxine (SYNTHROID) 75 MCG tablet Take 1 tablet by mouth daily 10/18/23   FLORIAN Moody CNP   omeprazole (PRILOSEC) 20 MG delayed release capsule TAKE ONE CAPSULE BY MOUTH EVERY MORNING BEFORE BREAKFAST 10/16/23   FLORIAN Moody CNP   furosemide (LASIX) 40 MG tablet Take 1 tablet by mouth in the morning and 1 tablet in the evening. 10/16/23   [place] Blood, DO   clopidogrel (PLAVIX) 75 MG tablet Take 1 tablet by mouth daily 10/17/23   [place] Blood, DO   metoprolol succinate (TOPROL XL) 25 MG extended release tablet Take 1 tablet by mouth daily 8/9/23   Clinton Hernández MD   NONFORMULARY Take 1 capsule by mouth in the morning and 1 capsule in the evening. OTC supplement [employer].

## 2023-10-28 NOTE — PLAN OF CARE
Problem: Discharge Planning  Goal: Discharge to home or other facility with appropriate resources  10/28/2023 1434 by Ryann Montano RN  Outcome: Completed  10/28/2023 0411 by Samia Rodas, RN  Outcome: Progressing  Flowsheets  Taken 10/27/2023 2021  Discharge to home or other facility with appropriate resources:   Identify barriers to discharge with patient and caregiver   Identify discharge learning needs (meds, wound care, etc)   Refer to discharge planning if patient needs post-hospital services based on physician order or complex needs related to functional status, cognitive ability or social support system  Taken 10/27/2023 2006  Discharge to home or other facility with appropriate resources:   Identify barriers to discharge with patient and caregiver   Arrange for needed discharge resources and transportation as appropriate   Identify discharge learning needs (meds, wound care, etc)     Problem: Safety - Adult  Goal: Free from fall injury  10/28/2023 1434 by Ryann Montano RN  Outcome: Completed  10/28/2023 0411 by Samia Rodas, RN  Outcome: Progressing

## 2023-10-29 NOTE — PROGRESS NOTES
[facility]  Office: [phone]  Kash Raman DO, Manan Beyer DO, Erick Polo DO, Marshal Carter DO, Efrain Sandoval MD, Rosalina Nicholas MD, Julio Baxter MD, Stella Cordero MD,  Norbert Parra MD, Marleen Byrd MD, Tomas Robison MD,  Ravinder Foley DO, Tej Flores MD, Paul Rodriguez MD, Marlo Raman DO, Angie Izaguirre MD,  Ankur Sarabia DO, Lori Uribe MD, Nola Garcia MD, Harini Beavers MD, Preethi Mosqueda MD,  Prudencio Lemon MD, Vivian Rivas MD, Giselle Christie MD, Max Winkler MD, Renato Morris MD, Bal Hopper DO, Ethan Romero DO, Malik Daniels MD,  Carroll Sharif MD, Shirley Waterhouse, CNP,  Jessica Saenz, CNP, Barry Hernandez, YOLI,  Pebbles Hinds, NICOLE, Rosemarie Gerber, CNP, Kayleen Singh, CNP, Angelica Alvarez CNP, Su Hoyt, CNP, Dannielle Ivan, CNP, Alaina Ferrer, CNP, Michelle Yadav, CNS, Madelaine Lama, CNP, Mary Carlson, CNP         [facility]   IN-PATIENT SERVICE   [facility]    Discharge Summary     Patient ID: Jenn White  :  1935   MRN: [id number]     ACCOUNT:  [id number]   Patient's PCP: Princess Stanford APRN - CNP  Admit Date: 10/27/2023   Discharge Date: 10/29/2023     Length of Stay: 2  Code Status:  DNR-CC  Admitting Physician: Giselle Wynn Sra, MD  Discharge Physician: FLORIAN Agarwal NP     Active Discharge Diagnoses:     Hospital Problem Lists:  Principal Problem:    Acute on chronic systolic heart failure (HCC)  Active Problems:    Hyperlipemia    Primary hypertension    Acute respiratory failure with hypoxia (HCC)    Chronic kidney disease    Ischemic cardiomyopathy    Paroxysmal atrial fibrillation (HCC)    Stage 3a chronic kidney disease (HCC)    Chronic Asymptomatic Internal carotid artery occlusion, right    Malaise and fatigue    Acute on chronic combined systolic (congestive) and diastolic (congestive) heart failure (HCC)  Resolved Problems:    * No resolved hospital problems.  tablet  Commonly known as: PROAMATINE     nitroGLYCERIN 0.4 MG SL tablet  Commonly known as: NITROSTAT     NONFORMULARY     potassium chloride 10 MEQ extended release capsule  Commonly known as: MICRO-K     therapeutic multivitamin-minerals tablet              No discharge procedures on file. Time Spent on discharge is  45 mins in patient examination, evaluation, counseling as well as medication reconciliation, prescriptions for required medications, discharge plan and follow up. Electronically signed by   FLORIAN Schofield NP  10/29/2023  4:03 PM      Thank you FLORIAN Serra - YOLI for the opportunity to be involved in this patient's care.

## 2023-10-29 NOTE — DISCHARGE SUMMARY
[facility]  Office: [phone]  Kash Raman DO, Manan Beyer DO, Erick Polo DO, Marshal Carter DO, Efrain Sandoval MD, Rosalina Nicholas MD, Julio Baxter MD, Stella Cordero MD,  Norbert Parra MD, Marleen Byrd MD, Tomas Robison MD,  Ravinder Foley DO, Tej Flores MD, Paul Rodriguez MD, Marlo Raman DO, Angie Izaguirre MD,  Ankur Sarabia DO, Lori Uribe MD, Nola Garcia MD, Harini Beavers MD, Preethi Mosqueda MD,  Prudencio Lemon MD, Vivian Rivas MD, Giselle Christie MD, Max Winkler MD, Renato Morris MD, Bal Hopper DO, Ethan Romero DO, Malik Daniels MD,  Carroll Sharif MD, Shirley Waterhouse, CNP,  Jessica Saenz, CNP, Barry Hernandez, YOLI,  Pebbles Hinds, NICOLE, Rosemarie Gerber, CNP, Kayleen Singh, CNP, Angelica Alvarez CNP, Su Hoyt, CNP, Dannielle Ivan, CNP, Alaina Ferrer, CNP, Michelle Yadav, CNS, Madelaine Lama, CNP, Mary Carlson, CNP         [facility]   IN-PATIENT SERVICE   [facility]    Discharge Summary     Patient ID: Jenn White  :  1935   MRN: [id number]     ACCOUNT:  [id number]   Patient's PCP: Princess Stanford APRN - CNP  Admit Date: 10/27/2023   Discharge Date: 10/29/2023     Length of Stay: 2  Code Status:  DNR-CC  Admitting Physician: Giselle Wynn Sra, MD  Discharge Physician: FLORIAN Agarwal NP     Active Discharge Diagnoses:     Hospital Problem Lists:  Principal Problem:    Acute on chronic systolic heart failure (HCC)  Active Problems:    Hyperlipemia    Primary hypertension    Acute respiratory failure with hypoxia (HCC)    Chronic kidney disease    Ischemic cardiomyopathy    Paroxysmal atrial fibrillation (HCC)    Stage 3a chronic kidney disease (HCC)    Chronic Asymptomatic Internal carotid artery occlusion, right    Malaise and fatigue    Acute on chronic combined systolic (congestive) and diastolic (congestive) heart failure (HCC)  Resolved Problems:    * No resolved hospital problems.  AM    CO2 30 10/28/2023 05:49 AM    ANIONGAP 10 10/28/2023 05:49 AM    BUN 24 10/28/2023 05:49 AM    CREATININE 0.5 10/28/2023 05:49 AM    BUNCRER 48 10/28/2023 05:49 AM    CALCIUM 8.9 10/28/2023 05:49 AM    LABGLOM >60 10/28/2023 05:49 AM    GFRAA 70.2 05/31/2023 10:51 AM    GFR      03/11/2020 12:53 PM    GFR NOT REPORTED 03/11/2020 12:53 PM        Radiology:  No results found. Consultations:    Consults:     Final Specialist Recommendations/Findings:   IP CONSULT TO HOSPITALIST  IP CONSULT TO HOSPICE      The patient was seen and examined on day of discharge and this discharge summary is in conjunction with any daily progress note from day of discharge. Discharge plan:     Disposition: Home    Physician Follow Up:     Hospice team    Requiring Further Evaluation/Follow Up POST HOSPITALIZATION/Incidental Findings: NA    Diet: regular diet    Activity: As tolerated    Instructions to Patient: engage with hospice     Discharge Medications:      Medication List        CHANGE how you take these medications      latanoprost 0.005 % ophthalmic solution  Commonly known as: Xalatan  Place 1 drop into both eyes daily  What changed: when to take this            CONTINUE taking these medications      omeprazole 20 MG delayed release capsule  Commonly known as: PRILOSEC  TAKE ONE CAPSULE BY MOUTH EVERY MORNING BEFORE BREAKFAST            STOP taking these medications      apixaban 5 MG Tabs tablet  Commonly known as: ELIQUIS     atorvastatin 80 MG tablet  Commonly known as: LIPITOR     brimonidine 0.2 % ophthalmic solution  Commonly known as: ALPHAGAN     clopidogrel 75 MG tablet  Commonly known as: PLAVIX     furosemide 40 MG tablet  Commonly known as: LASIX     levothyroxine 75 MCG tablet  Commonly known as: SYNTHROID     losartan 25 MG tablet  Commonly known as: COZAAR     Magnesium Gluconate 250 MG Tabs     metoprolol succinate 25 MG extended release tablet  Commonly known as:  Toprol XL     midodrine 2.5 MG

## 2023-10-29 NOTE — PLAN OF CARE
Patient was able to rest the first part of the night.  RN and pct repositioned patient to make her more comfortable.  I added a mepilex to the coccyx area due to redness. Patient did complain of generalized pain through out her body.  IV pain medication was ordered. Patient has a lot of throat drainage and needs to suction herself .  Wondering if she may benefit from something to help dry up the secretions? Patient has a meeting with hospice today around 230pm.  Patient incontinent, but has a purewick in place. Plan of care ongoing.     Siderails up x 2  Hourly rounding  Call light in reach  Instructed to call for assist before attempting out of bed.  Remains free from falls and accidental injury at this time   Floor free from obstacles  Bed is locked and in lowest position  Adequate lighting provided  Bed alarm on, [place] star and Stay with Me signs posted      Problem: Safety - Adult  Goal: Free from fall injury  10/28/2023 1434 by Kaye Mooney RN  Outcome: Completed     Problem: ABCDS Injury Assessment  Goal: Absence of physical injury  Outcome: Progressing     Checked for incontinence every 2 hours and prn.  Pericare as needed.  Assisted to reposition off back frequently.  On waffle mattress.  Heels off bed with pillows.    Problem: ABCDS Injury Assessment  Goal: Absence of physical injury  Outcome: Progressing     Problem: Skin/Tissue Integrity  Goal: Absence of new skin breakdown  Description: 1.  Monitor for areas of redness and/or skin breakdown  2.  Assess vascular access sites hourly  3.  Every 4-6 hours minimum:  Change oxygen saturation probe site  4.  Every 4-6 hours:  If on nasal continuous positive airway pressure, respiratory therapy assess nares and determine need for appliance change or resting period.  Outcome: Progressing     Problem: Cardiovascular - Adult  Goal: Maintains optimal cardiac output and hemodynamic stability  Recent Flowsheet Documentation  Taken 10/28/2023 1932 by Kaur  Jolie Turk RN  Maintains optimal cardiac output and hemodynamic stability:   Monitor blood pressure and heart rate   Monitor urine output and notify Licensed Independent Practitioner for values outside of normal range   Assess for signs of decreased cardiac output  10/28/2023 1434 by Jose Ansari RN  Outcome: Completed  Goal: Absence of cardiac dysrhythmias or at baseline  Recent Flowsheet Documentation  Taken 10/28/2023 1932 by Ran Joseph RN  Absence of cardiac dysrhythmias or at baseline: Monitor cardiac rate and rhythm  10/28/2023 1434 by Jose Ansari RN  Outcome: Completed     Problem: Discharge Planning  Goal: Discharge to home or other facility with appropriate resources  Recent Flowsheet Documentation  Taken 10/28/2023 1932 by Ran Joseph RN  Discharge to home or other facility with appropriate resources:   Identify barriers to discharge with patient and caregiver   Arrange for needed discharge resources and transportation as appropriate   Identify discharge learning needs (meds, wound care, etc)  10/28/2023 1434 by Jose Ansari RN  Outcome: Completed

## 2023-10-29 NOTE — PROGRESS NOTES
[facility]  Office: [phone]  1826, DO, Manan Deng, DO, Karlene Stauffer, DO, Parveen Carter, DO, Julio C Fuentes MD, Meenu Louie MD, Juana Ramirez MD, Elham Ludwig MD,  Alexia Robbins MD, Yen Lopez MD, Constance Castanon MD,  Azeem Sparrow MD, Shanti Marcos MD, Crystal Rodriguez DO, Morales Ortega MD,  Angeline Koenig DO, Rogers Dick MD, Jodee Brooks MD, Umberto Poe MD, Rudolph Del Toro MD,  Kennedy Blandon MD, Chani Joya MD, Bubba Schwab, MD, Zach Phipps MD, Sarahi Dean MD, Daquan Parsons DO, Farzaneh Sherwood DO, Donnell Diamond MD,  Melissa Acosta MD, Juan Felix CNP,  Bella Kurtz CNP, Edgar Cotton, YOLI,  Cookie Villanueva, NICOLE, Abelardo Martinez, CNP, Elmer Elena, CNP, Maryam Simpson, CNP, Griffin Fan, CNP, Shavonne Brar, CNP, Jaja Conde, CNP, Joon Ramirez, [facility], Mitch Blanco, CNP, Pablo Potter, [facility]    Progress Note    10/29/2023    11:12 AM    Name:   Doris Ingram  MRN:     [id number]     [address]:      [de-identified]   Room:   2010/2010-02  IP Day:  2  Admit Date:  10/27/2023  3:39 PM    PCP:   FLORIAN Pal CNP  Code Status:  DNR-CC    Subjective:     C/C:   Chief Complaint   Patient presents with    Fatigue     Progressive since August, consult with hospice on Sunday  Had 1500 appt with pcp today but was unable to stand to get into car. Interval History Status: worsened. Patient condition continues to worsen. She is now having difficulty swallowing due to severe dry mouth. She is very anxious and uncomfortable. Diuretics remain underway, hospice consultation pending, will initiate ProAmatine in the meantime to stabilize blood pressures. Brief History:        10/27- Patient reports to the emergency department with worsening fatigue.   Patient has been treated for congestive heart failure for many years and was recently

## 2023-10-29 NOTE — PROGRESS NOTES
Patient discharged off unit to inpatient hospice. Family at bedside took home all patient belongings. PAtient is resting comfortably after receiving IV ativan. Peripheral IV removed without complications. Paperwork already sent over by hospice nurse. Report given by hospice nurse.

## 2023-10-30 ENCOUNTER — TELEPHONE (OUTPATIENT)
Dept: FAMILY MEDICINE CLINIC | Age: 88
End: 2023-10-30

## 2023-10-30 NOTE — TELEPHONE ENCOUNTER
Attempted to call patient for a [facility] follow up. Patient daughter answered.  Pt went into hospice last night at 5pm and passed away at 11pm.